# Patient Record
Sex: MALE | Race: WHITE | NOT HISPANIC OR LATINO | URBAN - METROPOLITAN AREA
[De-identification: names, ages, dates, MRNs, and addresses within clinical notes are randomized per-mention and may not be internally consistent; named-entity substitution may affect disease eponyms.]

---

## 2017-04-04 ENCOUNTER — EMERGENCY (EMERGENCY)
Facility: HOSPITAL | Age: 72
LOS: 1 days | Discharge: HOME | End: 2017-04-04
Attending: EMERGENCY MEDICINE | Admitting: EMERGENCY MEDICINE
Payer: MEDICARE

## 2017-04-04 VITALS
TEMPERATURE: 98 F | SYSTOLIC BLOOD PRESSURE: 175 MMHG | OXYGEN SATURATION: 97 % | RESPIRATION RATE: 20 BRPM | DIASTOLIC BLOOD PRESSURE: 88 MMHG | HEART RATE: 85 BPM

## 2017-04-04 VITALS
HEART RATE: 88 BPM | SYSTOLIC BLOOD PRESSURE: 152 MMHG | DIASTOLIC BLOOD PRESSURE: 85 MMHG | TEMPERATURE: 98 F | OXYGEN SATURATION: 100 % | RESPIRATION RATE: 19 BRPM

## 2017-04-04 DIAGNOSIS — R10.11 RIGHT UPPER QUADRANT PAIN: ICD-10-CM

## 2017-04-04 DIAGNOSIS — Z90.89 ACQUIRED ABSENCE OF OTHER ORGANS: Chronic | ICD-10-CM

## 2017-04-04 LAB
ALBUMIN SERPL ELPH-MCNC: 4 G/DL — SIGNIFICANT CHANGE UP (ref 3.3–5)
ALP SERPL-CCNC: 71 U/L — SIGNIFICANT CHANGE UP (ref 40–120)
ALT FLD-CCNC: 17 U/L RC — SIGNIFICANT CHANGE UP (ref 10–45)
ANION GAP SERPL CALC-SCNC: 15 MMOL/L — SIGNIFICANT CHANGE UP (ref 5–17)
APPEARANCE UR: CLEAR — SIGNIFICANT CHANGE UP
APTT BLD: 32.2 SEC — SIGNIFICANT CHANGE UP (ref 27.5–37.4)
AST SERPL-CCNC: 13 U/L — SIGNIFICANT CHANGE UP (ref 10–40)
BASOPHILS # BLD AUTO: 0.1 K/UL — SIGNIFICANT CHANGE UP (ref 0–0.2)
BILIRUB SERPL-MCNC: 0.4 MG/DL — SIGNIFICANT CHANGE UP (ref 0.2–1.2)
BILIRUB UR-MCNC: NEGATIVE — SIGNIFICANT CHANGE UP
BUN SERPL-MCNC: 28 MG/DL — HIGH (ref 7–23)
CALCIUM SERPL-MCNC: 9.4 MG/DL — SIGNIFICANT CHANGE UP (ref 8.4–10.5)
CHLORIDE SERPL-SCNC: 100 MMOL/L — SIGNIFICANT CHANGE UP (ref 96–108)
CO2 SERPL-SCNC: 23 MMOL/L — SIGNIFICANT CHANGE UP (ref 22–31)
COLOR SPEC: YELLOW — SIGNIFICANT CHANGE UP
CREAT SERPL-MCNC: 1.27 MG/DL — SIGNIFICANT CHANGE UP (ref 0.5–1.3)
DIFF PNL FLD: NEGATIVE — SIGNIFICANT CHANGE UP
EOSINOPHIL # BLD AUTO: 0.3 K/UL — SIGNIFICANT CHANGE UP (ref 0–0.5)
EOSINOPHIL NFR BLD AUTO: 3 % — SIGNIFICANT CHANGE UP (ref 0–6)
GAS PNL BLDV: SIGNIFICANT CHANGE UP
GLUCOSE SERPL-MCNC: 319 MG/DL — HIGH (ref 70–99)
GLUCOSE UR QL: >1000
HAV IGM SER-ACNC: SIGNIFICANT CHANGE UP
HBV CORE IGM SER-ACNC: SIGNIFICANT CHANGE UP
HBV SURFACE AG SER-ACNC: SIGNIFICANT CHANGE UP
HCT VFR BLD CALC: 48.1 % — SIGNIFICANT CHANGE UP (ref 39–50)
HCV AB S/CO SERPL IA: 0.23 S/CO — SIGNIFICANT CHANGE UP
HCV AB SERPL-IMP: SIGNIFICANT CHANGE UP
HGB BLD-MCNC: 15.9 G/DL — SIGNIFICANT CHANGE UP (ref 13–17)
HYALINE CASTS # UR AUTO: ABNORMAL
INR BLD: 0.95 RATIO — SIGNIFICANT CHANGE UP (ref 0.88–1.16)
KETONES UR-MCNC: NEGATIVE — SIGNIFICANT CHANGE UP
LEUKOCYTE ESTERASE UR-ACNC: NEGATIVE — SIGNIFICANT CHANGE UP
LIDOCAIN IGE QN: 22 U/L — SIGNIFICANT CHANGE UP (ref 7–60)
LYMPHOCYTES # BLD AUTO: 62 % — HIGH (ref 13–44)
LYMPHOCYTES # BLD AUTO: 8.1 K/UL — HIGH (ref 1–3.3)
MCHC RBC-ENTMCNC: 29 PG — SIGNIFICANT CHANGE UP (ref 27–34)
MCHC RBC-ENTMCNC: 33 GM/DL — SIGNIFICANT CHANGE UP (ref 32–36)
MCV RBC AUTO: 88 FL — SIGNIFICANT CHANGE UP (ref 80–100)
MONOCYTES # BLD AUTO: 0.8 K/UL — SIGNIFICANT CHANGE UP (ref 0–0.9)
MONOCYTES NFR BLD AUTO: 5 % — SIGNIFICANT CHANGE UP (ref 2–14)
NEUTROPHILS # BLD AUTO: 3.5 K/UL — SIGNIFICANT CHANGE UP (ref 1.8–7.4)
NEUTROPHILS NFR BLD AUTO: 29 % — LOW (ref 43–77)
NITRITE UR-MCNC: NEGATIVE — SIGNIFICANT CHANGE UP
PH UR: 5.5 — SIGNIFICANT CHANGE UP (ref 4.8–8)
PLATELET # BLD AUTO: 160 K/UL — SIGNIFICANT CHANGE UP (ref 150–400)
POTASSIUM SERPL-MCNC: 4.3 MMOL/L — SIGNIFICANT CHANGE UP (ref 3.5–5.3)
POTASSIUM SERPL-SCNC: 4.3 MMOL/L — SIGNIFICANT CHANGE UP (ref 3.5–5.3)
PROT SERPL-MCNC: 7 G/DL — SIGNIFICANT CHANGE UP (ref 6–8.3)
PROT UR-MCNC: NEGATIVE — SIGNIFICANT CHANGE UP
PROTHROM AB SERPL-ACNC: 10.3 SEC — SIGNIFICANT CHANGE UP (ref 9.8–12.7)
RBC # BLD: 5.46 M/UL — SIGNIFICANT CHANGE UP (ref 4.2–5.8)
RBC # FLD: 12.7 % — SIGNIFICANT CHANGE UP (ref 10.3–14.5)
SODIUM SERPL-SCNC: 138 MMOL/L — SIGNIFICANT CHANGE UP (ref 135–145)
SP GR SPEC: 1.03 — HIGH (ref 1.01–1.02)
UROBILINOGEN FLD QL: NEGATIVE — SIGNIFICANT CHANGE UP
WBC # BLD: 12.8 K/UL — HIGH (ref 3.8–10.5)
WBC # FLD AUTO: 12.8 K/UL — HIGH (ref 3.8–10.5)
WBC UR QL: SIGNIFICANT CHANGE UP /HPF (ref 0–5)

## 2017-04-04 PROCEDURE — 81001 URINALYSIS AUTO W/SCOPE: CPT

## 2017-04-04 PROCEDURE — 84132 ASSAY OF SERUM POTASSIUM: CPT

## 2017-04-04 PROCEDURE — 83605 ASSAY OF LACTIC ACID: CPT

## 2017-04-04 PROCEDURE — 99284 EMERGENCY DEPT VISIT MOD MDM: CPT | Mod: 25

## 2017-04-04 PROCEDURE — 82803 BLOOD GASES ANY COMBINATION: CPT

## 2017-04-04 PROCEDURE — 71020: CPT | Mod: 26

## 2017-04-04 PROCEDURE — 85610 PROTHROMBIN TIME: CPT

## 2017-04-04 PROCEDURE — 96374 THER/PROPH/DIAG INJ IV PUSH: CPT | Mod: XU

## 2017-04-04 PROCEDURE — 82435 ASSAY OF BLOOD CHLORIDE: CPT

## 2017-04-04 PROCEDURE — 80053 COMPREHEN METABOLIC PANEL: CPT

## 2017-04-04 PROCEDURE — 96375 TX/PRO/DX INJ NEW DRUG ADDON: CPT | Mod: XU

## 2017-04-04 PROCEDURE — 99284 EMERGENCY DEPT VISIT MOD MDM: CPT | Mod: GC

## 2017-04-04 PROCEDURE — 85027 COMPLETE CBC AUTOMATED: CPT

## 2017-04-04 PROCEDURE — 83690 ASSAY OF LIPASE: CPT

## 2017-04-04 PROCEDURE — 85730 THROMBOPLASTIN TIME PARTIAL: CPT

## 2017-04-04 PROCEDURE — 82947 ASSAY GLUCOSE BLOOD QUANT: CPT

## 2017-04-04 PROCEDURE — 80074 ACUTE HEPATITIS PANEL: CPT

## 2017-04-04 PROCEDURE — 84295 ASSAY OF SERUM SODIUM: CPT

## 2017-04-04 PROCEDURE — 74177 CT ABD & PELVIS W/CONTRAST: CPT

## 2017-04-04 PROCEDURE — 82330 ASSAY OF CALCIUM: CPT

## 2017-04-04 PROCEDURE — 74177 CT ABD & PELVIS W/CONTRAST: CPT | Mod: 26

## 2017-04-04 PROCEDURE — 71046 X-RAY EXAM CHEST 2 VIEWS: CPT

## 2017-04-04 PROCEDURE — 85014 HEMATOCRIT: CPT

## 2017-04-04 RX ORDER — FAMOTIDINE 10 MG/ML
20 INJECTION INTRAVENOUS ONCE
Qty: 0 | Refills: 0 | Status: COMPLETED | OUTPATIENT
Start: 2017-04-04 | End: 2017-04-04

## 2017-04-04 RX ORDER — KETOROLAC TROMETHAMINE 30 MG/ML
15 SYRINGE (ML) INJECTION ONCE
Qty: 0 | Refills: 0 | Status: DISCONTINUED | OUTPATIENT
Start: 2017-04-04 | End: 2017-04-04

## 2017-04-04 RX ORDER — ACETAMINOPHEN 500 MG
1000 TABLET ORAL ONCE
Qty: 0 | Refills: 0 | Status: COMPLETED | OUTPATIENT
Start: 2017-04-04 | End: 2017-04-04

## 2017-04-04 RX ADMIN — Medication 400 MILLIGRAM(S): at 08:42

## 2017-04-04 RX ADMIN — Medication 30 MILLILITER(S): at 08:42

## 2017-04-04 RX ADMIN — FAMOTIDINE 20 MILLIGRAM(S): 10 INJECTION INTRAVENOUS at 08:42

## 2017-04-04 NOTE — ED PROVIDER NOTE - PLAN OF CARE
Follow up with Dr. Arzate this week. You were given a copy of your results. You must discuss all findings with your PMD. Any new or worsening symptoms or any other concern please return to the ED.

## 2017-04-04 NOTE — ED ADULT NURSE NOTE - OBJECTIVE STATEMENT
71y male with a pmh of htn, high cholesterol, chronic back pain, and diabetes was sent to the ER today by his PCP for upper right abdominal pain. Pt describes the pain as a stabbing pain with a rating around 4. Pt cannot correlate pain to eating or drinking. Pt has NKDA, denies chest pain and SOB. Pt is A&Ox4. Pt has normoactive bowel sounds and has pain upon palpation of upper right abdomen. Pt is lying comfortable in bed at this time. MD in to assess. 71y male with a pmh of htn, high cholesterol, chronic back pain, and type 2 diabetes was sent to the ER today by his PCP for upper right abdominal pain.  pt is alert and oriented x4 speaking coherently. Pt was seen by his primary care and sent for ultrasound to rule out gallstones. Pt describes the pain as a stabbing pain in right upper quad with a  pain rating "around 4". Pt cannot correlate pain to eating or drinking. Pt has NKDA, denies chest pain and SOB, fever, chills, nausea, vomiting.  Pt has normoactive bowel sounds and has pain upon palpation of upper right abdomen. Pt is lying comfortable in bed at this time. MD at bedside, will continue to reassess.

## 2017-04-04 NOTE — ED PROVIDER NOTE - ATTENDING CONTRIBUTION TO CARE
Private Physician Giovanni Arzate ([phone])  71y male pmh DM,HTN,HLD Fatty liver dx few days ago. Pt had complains of abd pain for past several months. Pt comes to ed complains of pain worsening over past 6wk ruq. No precipitating.alleviating factors. Worse with palp. Had seen pmd had sono showing no gallstones with fatty liver. No nasuea vomiting,.No chest pain palp.sob, fever chills, diarrhea, Pt has been dieting with regaining 12 lbs after "fell off the wagon" PE WDWN male nad normocephalic atraumatic chest chest clear anterior & posterior abd soft +bs no mass rebound guarding. CV no rubs, gallops or murmurs neuro no focal defects  Atilio Patel MD, Facep

## 2017-04-04 NOTE — ED PROVIDER NOTE - CARE PLAN
Principal Discharge DX:	RUQ pain Principal Discharge DX:	RUQ pain  Instructions for follow-up, activity and diet:	Follow up with Dr. Arztae this week. You were given a copy of your results. You must discuss all findings with your PMD. Any new or worsening symptoms or any other concern please return to the ED. Principal Discharge DX:	RUQ pain  Instructions for follow-up, activity and diet:	Follow up with Dr. Arzate this week. You were given a copy of your results. You must discuss all findings with your PMD. Any new or worsening symptoms or any other concern please return to the ED.

## 2017-04-04 NOTE — ED ADULT NURSE NOTE - CHPI ED SYMPTOMS NEG
no dysuria/no blood in stool/no hematuria/no burning urination/no chills/no diarrhea/no vomiting/no nausea/no abdominal distension

## 2017-04-04 NOTE — ED PROVIDER NOTE - PROGRESS NOTE DETAILS
Private Physician na  Atilio Patel MD, Facep {[facility] [phone]. - Ross Mccurdy, Resident Discussed with Dr Arzate will fu pt and ct findings  Atilio Patel MD, Facep

## 2017-04-04 NOTE — ED PROVIDER NOTE - OBJECTIVE STATEMENT
72yo M with pmhx DMII, HTN, Hepatic steatosis, HLD pw right upper quadrant pain for 6 months has been intermittent but this last bout has been for 6mo. Tolerating po without exacerbating symptoms. Pain is persistent then occasional becomes stabbing with movement. Describes stinging like a cramp after running.  No nausea, vomiting, diarrhea, constipation. No dysuria, chest pain, sob, cough, rhinorrhea, fever, melena, hematochezia, calf pain. no change in vision.   +chronic diabetic neuropahty in le bl.   US negative 4 days ago.  Yung Smith

## 2017-04-04 NOTE — ED PROVIDER NOTE - PHYSICAL EXAMINATION
AAOX3, NAD, NCAT, EOMI, PERRL, MMM, Neck Supple, RRR, CTABL, ABD S/ruq ttp no guarding or reboudn, ND +BS, No CVAT, EXT warm, well perfused, No Edema, Distal Pulses Intact, No Rash,  MAEx4, Sensation to soft touch grossly intact, normal strength/tone.

## 2017-04-04 NOTE — ED PROVIDER NOTE - MEDICAL DECISION MAKING DETAILS
right upper quadrant pain with US which demonstrated hepatic steatosis four days - labs, right upper quadrant pain with US which demonstrated hepatic steatosis four days - labs, Pain has persisted will check labs ct abd ua

## 2017-08-07 ENCOUNTER — APPOINTMENT (OUTPATIENT)
Dept: RHEUMATOLOGY | Facility: CLINIC | Age: 72
End: 2017-08-07
Payer: MEDICARE

## 2017-08-07 VITALS
OXYGEN SATURATION: 99 % | WEIGHT: 230 LBS | HEART RATE: 78 BPM | TEMPERATURE: 98 F | BODY MASS INDEX: 31.19 KG/M2 | RESPIRATION RATE: 16 BRPM | SYSTOLIC BLOOD PRESSURE: 170 MMHG | DIASTOLIC BLOOD PRESSURE: 98 MMHG

## 2017-08-07 DIAGNOSIS — Z86.39 PERSONAL HISTORY OF OTHER ENDOCRINE, NUTRITIONAL AND METABOLIC DISEASE: ICD-10-CM

## 2017-08-07 DIAGNOSIS — N28.9 DISORDER OF KIDNEY AND URETER, UNSPECIFIED: ICD-10-CM

## 2017-08-07 DIAGNOSIS — Z78.9 OTHER SPECIFIED HEALTH STATUS: ICD-10-CM

## 2017-08-07 DIAGNOSIS — E78.2 MIXED HYPERLIPIDEMIA: ICD-10-CM

## 2017-08-07 DIAGNOSIS — E66.9 OBESITY, UNSPECIFIED: ICD-10-CM

## 2017-08-07 DIAGNOSIS — Z87.891 PERSONAL HISTORY OF NICOTINE DEPENDENCE: ICD-10-CM

## 2017-08-07 DIAGNOSIS — Z91.19 PATIENT'S NONCOMPLIANCE WITH OTHER MEDICAL TREATMENT AND REGIMEN: ICD-10-CM

## 2017-08-07 DIAGNOSIS — Z60.2 PROBLEMS RELATED TO LIVING ALONE: ICD-10-CM

## 2017-08-07 DIAGNOSIS — I10 ESSENTIAL (PRIMARY) HYPERTENSION: ICD-10-CM

## 2017-08-07 DIAGNOSIS — Z00.00 ENCOUNTER FOR GENERAL ADULT MEDICAL EXAMINATION W/OUT ABNORMAL FINDINGS: ICD-10-CM

## 2017-08-07 PROCEDURE — 99205 OFFICE O/P NEW HI 60 MIN: CPT

## 2017-08-07 SDOH — SOCIAL STABILITY - SOCIAL INSECURITY: PROBLEMS RELATED TO LIVING ALONE: Z60.2

## 2017-08-08 LAB
ALBUMIN MFR SERPL ELPH: 57.7 %
ALBUMIN SERPL ELPH-MCNC: 4.2 G/DL
ALBUMIN SERPL-MCNC: 4.3 G/DL
ALBUMIN/GLOB SERPL: 1.3 RATIO
ALP BLD-CCNC: 80 U/L
ALPHA1 GLOB MFR SERPL ELPH: 3 %
ALPHA1 GLOB SERPL ELPH-MCNC: 0.2 G/DL
ALPHA2 GLOB MFR SERPL ELPH: 9.3 %
ALPHA2 GLOB SERPL ELPH-MCNC: 0.7 G/DL
ALT SERPL-CCNC: 12 U/L
ANION GAP SERPL CALC-SCNC: 15 MMOL/L
APPEARANCE: CLEAR
AST SERPL-CCNC: 11 U/L
B-GLOBULIN MFR SERPL ELPH: 12.4 %
B-GLOBULIN SERPL ELPH-MCNC: 0.9 G/DL
BASOPHILS # BLD AUTO: 0.05 K/UL
BASOPHILS NFR BLD AUTO: 0.4 %
BILIRUB SERPL-MCNC: 0.4 MG/DL
BILIRUBIN URINE: NEGATIVE
BLOOD URINE: NEGATIVE
BUN SERPL-MCNC: 26 MG/DL
CALCIUM SERPL-MCNC: 9.7 MG/DL
CCP AB SER IA-ACNC: <8 UNITS
CHLORIDE SERPL-SCNC: 98 MMOL/L
CHOLEST SERPL-MCNC: 267 MG/DL
CHOLEST/HDLC SERPL: 7.9 RATIO
CO2 SERPL-SCNC: 23 MMOL/L
COLOR: YELLOW
CREAT SERPL-MCNC: 1.46 MG/DL
CRP SERPL-MCNC: 0.2 MG/DL
EOSINOPHIL # BLD AUTO: 0.43 K/UL
EOSINOPHIL NFR BLD AUTO: 3.5 %
ERYTHROCYTE [SEDIMENTATION RATE] IN BLOOD BY WESTERGREN METHOD: 16 MM/HR
GAMMA GLOB FLD ELPH-MCNC: 1.3 G/DL
GAMMA GLOB MFR SERPL ELPH: 17.6 %
GLUCOSE QUALITATIVE U: 1000 MG/DL
GLUCOSE SERPL-MCNC: 213 MG/DL
HBA1C MFR BLD HPLC: 9.8 %
HCT VFR BLD CALC: 45.9 %
HDLC SERPL-MCNC: 34 MG/DL
HGB BLD-MCNC: 15.4 G/DL
IMM GRANULOCYTES NFR BLD AUTO: 0.3 %
INTERPRETATION SERPL IEP-IMP: NORMAL
KETONES URINE: NEGATIVE
LDLC SERPL CALC-MCNC: NORMAL
LEUKOCYTE ESTERASE URINE: NEGATIVE
LYMPHOCYTES # BLD AUTO: 7.14 K/UL
LYMPHOCYTES NFR BLD AUTO: 57.8 %
MAN DIFF?: NORMAL
MCHC RBC-ENTMCNC: 29.2 PG
MCHC RBC-ENTMCNC: 33.6 GM/DL
MCV RBC AUTO: 87.1 FL
MONOCYTES # BLD AUTO: 0.76 K/UL
MONOCYTES NFR BLD AUTO: 6.2 %
NEUTROPHILS # BLD AUTO: 3.93 K/UL
NEUTROPHILS NFR BLD AUTO: 31.8 %
NITRITE URINE: NEGATIVE
PH URINE: 5.5
PLATELET # BLD AUTO: 190 K/UL
POTASSIUM SERPL-SCNC: 4.6 MMOL/L
PROT SERPL-MCNC: 7.5 G/DL
PROTEIN URINE: NEGATIVE MG/DL
PSA FREE SERPL-MCNC: 0.98 NG/ML
RBC # BLD: 5.27 M/UL
RBC # FLD: 14.9 %
RF+CCP IGG SER-IMP: NEGATIVE
RHEUMATOID FACT SER QL: 8 IU/ML
SODIUM SERPL-SCNC: 136 MMOL/L
SPECIFIC GRAVITY URINE: 1.03
TRIGL SERPL-MCNC: 619 MG/DL
UROBILINOGEN URINE: NORMAL MG/DL
WBC # FLD AUTO: 12.35 K/UL

## 2017-08-10 PROBLEM — E66.9 OBESITY (BMI 30-39.9): Status: ACTIVE | Noted: 2017-08-10

## 2017-08-10 PROBLEM — N28.9 RENAL INSUFFICIENCY, MILD: Status: ACTIVE | Noted: 2017-08-10

## 2017-08-10 PROBLEM — Z91.19 NON COMPLIANCE WITH MEDICAL TREATMENT: Status: ACTIVE | Noted: 2017-08-10

## 2017-08-10 PROBLEM — E78.2 MIXED HYPERLIPIDEMIA: Status: ACTIVE | Noted: 2017-08-10

## 2017-08-12 ENCOUNTER — APPOINTMENT (OUTPATIENT)
Dept: RADIOLOGY | Facility: IMAGING CENTER | Age: 72
End: 2017-08-12
Payer: MEDICARE

## 2017-08-12 ENCOUNTER — OUTPATIENT (OUTPATIENT)
Dept: OUTPATIENT SERVICES | Facility: HOSPITAL | Age: 72
LOS: 1 days | End: 2017-08-12
Payer: MEDICARE

## 2017-08-12 DIAGNOSIS — M48.10 ANKYLOSING HYPEROSTOSIS [FORESTIER], SITE UNSPECIFIED: ICD-10-CM

## 2017-08-12 DIAGNOSIS — Z90.89 ACQUIRED ABSENCE OF OTHER ORGANS: Chronic | ICD-10-CM

## 2017-08-12 DIAGNOSIS — M54.6 PAIN IN THORACIC SPINE: ICD-10-CM

## 2017-08-12 DIAGNOSIS — Z00.00 ENCOUNTER FOR GENERAL ADULT MEDICAL EXAMINATION WITHOUT ABNORMAL FINDINGS: ICD-10-CM

## 2017-08-12 PROCEDURE — 72040 X-RAY EXAM NECK SPINE 2-3 VW: CPT

## 2017-08-12 PROCEDURE — 72070 X-RAY EXAM THORAC SPINE 2VWS: CPT

## 2017-08-12 PROCEDURE — 72070 X-RAY EXAM THORAC SPINE 2VWS: CPT | Mod: 26

## 2017-08-12 PROCEDURE — 72040 X-RAY EXAM NECK SPINE 2-3 VW: CPT | Mod: 26

## 2017-08-14 LAB — HLA-B27 RELATED AG QL: NORMAL

## 2017-08-15 ENCOUNTER — FORM ENCOUNTER (OUTPATIENT)
Age: 72
End: 2017-08-15

## 2017-08-16 ENCOUNTER — APPOINTMENT (OUTPATIENT)
Dept: MRI IMAGING | Facility: IMAGING CENTER | Age: 72
End: 2017-08-16

## 2017-08-16 ENCOUNTER — OUTPATIENT (OUTPATIENT)
Dept: OUTPATIENT SERVICES | Facility: HOSPITAL | Age: 72
LOS: 1 days | End: 2017-08-16

## 2017-08-16 DIAGNOSIS — Z90.89 ACQUIRED ABSENCE OF OTHER ORGANS: Chronic | ICD-10-CM

## 2017-08-16 DIAGNOSIS — Z00.8 ENCOUNTER FOR OTHER GENERAL EXAMINATION: ICD-10-CM

## 2017-09-27 ENCOUNTER — APPOINTMENT (OUTPATIENT)
Dept: MRI IMAGING | Facility: HOSPITAL | Age: 72
End: 2017-09-27

## 2017-09-27 ENCOUNTER — OUTPATIENT (OUTPATIENT)
Dept: OUTPATIENT SERVICES | Facility: HOSPITAL | Age: 72
LOS: 1 days | End: 2017-09-27

## 2017-09-27 DIAGNOSIS — Z90.89 ACQUIRED ABSENCE OF OTHER ORGANS: Chronic | ICD-10-CM

## 2017-09-27 DIAGNOSIS — R07.89 OTHER CHEST PAIN: ICD-10-CM

## 2018-01-15 NOTE — ED PROVIDER NOTE - CONSTITUTIONAL DEVELOPMENT, MLM
OT ACUTE Initial Evaluation/Observation Note                                                                                                                                            BASELINE STATUS COMPARED WITH CURRENT STATUS: Presents with ADL/IADL status significantly below baseline, which was independent. Pt lives in an apt alone with limited support of friend or family. Prior to admission was driving and independent with meal prep , shopping and cleaning, Admitted following a fall on stairs sustaining a acute trimalleolar fx right ankle with conservative management using CAM boot and NWB status.     ASSESSMENT:  Treatment today focused on OT evaluation.  Pt had just completed PT session with trial of stance and transfer with inability to maintain his NWB status. Emphasis of OT session on sitting EOB for education and training in compensatory strategies for LE dressing with overall mod assist.  Provided pt with donation clothing with plan to cut opening of pain leg. Issued light UE theraband exercises to assist with strength needed for future transfers.  Pt requesting to talk to nursing regarding pain pills as pt very concerned about potential pain not being controlled. .   Patient will benefit from further skilled OT for continued training to help the patient meet goals as listed in functional data section below. Based on current level of assist needed and lack of support anticipate pt will need SA rehab with pt in agreement    Objective Measures Impacting Discharge Recommendation:      RECOMMENDATIONS FOR DISCHARGE:  Recommendations for Discharge: OT: Less intensive rehab (01/15/18 8416)      Equipment:  PT/OT Mobility Equipment for Discharge: Owns cane (01/15/18 6558)       PLAN: Continue skilled OT  Treatment Plan for Next Session: assess stance with NWB in prep for dressing or possible toilet transfer, reveiw UE theraband exercises        Frequency Comments: M-F() BM    AM-PAC Outcomes -Daily Activity  Domain  How much help from another person does the patient currently need?*  Task Score  Norm   1. Putting on and taking off regular lower body clothing? 2 - A Lot   2. Bathing (including washing, rinsing, drying)?   2 - A Lot   3. Toileting, which includes using toilet, bedpan, or urinal? 2 - A Lot   4. Putting on and taking off regular upper body clothing? 3 - A Little   5. Taking care of personal grooming such as brushing teeth? 4 - None   6. Eating meals?  4 - None   Raw Score: 15/24   Converted Score:  34.69 (Raw score = 15)   G code conversion CK: 40- 59% impairment (Raw score: 13-18)   Converted score >39.4 predictive of discharge to home  *Score based on clinical judgment/expected performance, may not have been performed during this session  Scoring Guidelines  1) Patient may use assistive devices unless otherwise indicated in question  2) Do not consider help for management of medical devices only (IV poles, catheters, NG, etc) as part of assist level  3) If patient requires device that substitutes for toileting or eating function (catheter, NG tube, PEG) they do not engage in these activities and are scored as Total  4) If activity was not observed and patient unable to do the activity, select \"Total\" .  If the patient can do the activity but was not directly observed, use profession judgment to determine how much assistance needed.    Task Modification: clinical decision making of low complexity, no task modification    Diagnosis:  1. Fall, initial encounter    2. Closed displaced Maisonneuve fracture of right lower extremity, initial encounter        Co-morbidities: There is no problem list on file for this patient.      Precautions:  Precautions  Weight Bearing Status: Non-weight bearing right lower extremity (01/15/18 1508)  Other Precautions: Fall, acute trimalleolar fx right ankle.  (01/15/18 1508)  Precautions Comments: Awaiting formal ortho consult, but pt currently has CAM boot on and NWB RLE per  activity orders.  (01/15/18 1508)    Prior Living Situation:  Type of Home: Apartment (01/15/18 1505)  Lives With: Alone (01/15/18 1505)    EDUCATION:   On this date, the patient was educated on ADL.    The response to education was: Needs reinforcement                                                    SUBJECTIVE: Patient's Personal Goal: be able to walk (01/15/18 1601)   Subjective: \"I am worried I won't get enough pain medication\" (01/15/18 1601)       OBJECTIVE:Basic Lines: IV;Dewitt catheter (01/15/18 1508)  Safety Measures: Alarms (01/15/18 1508)    RN reported Jordan Fall Scale Score: 80       Last 24 hours of Functional Data     ADLs  Self Cares/ADL's  Lower Body Clothing Assistance: Maximal Assist (Max) (01/15/18 1601)  Self Cares/ADL's Comments #1: Education on compensatory dressing strategies. Donation clothing provided with possible plan to cut open bottom of right leg pant (01/15/18 1601)    Household mobility  Household Mobility  Supine to Sit: Moderate Assist (Mod) (01/15/18 1601)  Sit to Supine: Moderate Assist (Mod) (01/15/18 1601)  Household Mobility Comments #1: unable to tolerate attempted transfer by PT just prior to OT session  (01/15/18 1601)    Home Management       Tolerance  OT Activity Tolerance  Activity Tolerance: 1:1 Activity to rest (01/15/18 1601)  Activity Tolerance Comments: limited by pain (01/15/18 1601)    Vitals       Cognition  Cognition Comments: would beneift from brief cognitive screen (01/15/18 1601)    Communication/Cognition  Cognition Comments: would beneift from brief cognitive screen (01/15/18 1601)    Patient's Personal Goal: be able to walk (01/15/18 1601)    Therapy Goals  Goals  Short Term Goals to Be Reviewed On: 01/21/18 (01/15/18 1601)  Short Term Goals Are The Same as Discharge Goals: Yes (01/15/18 1601)  Goal Agreement: Patient agrees with goals and treatment plan (01/15/18 1601)  Bathing Short Term Goal 1: min assist standing with UE support and adherence to  NWB (01/15/18 1601)  Dressing Short Term Goal 1: min asisst with AE PRN  (01/15/18 1601)  Toileting Short Term Goal 1: min assist to commode (01/15/18 1601)    Interventions and Treatment Time  Treatment Interventions: ADL retraining;Functional transfer training;UE strengthening/ROM (01/15/18 1601)  OT Time Spent: 45 minutes (01/15/18 1601)      See OT flowsheet for full details regarding the OT therapy provided.      well developed

## 2021-09-03 PROBLEM — E11.9 TYPE 2 DIABETES MELLITUS WITHOUT COMPLICATIONS: Chronic | Status: ACTIVE | Noted: 2017-04-04

## 2021-09-03 PROBLEM — I10 ESSENTIAL (PRIMARY) HYPERTENSION: Chronic | Status: ACTIVE | Noted: 2017-04-04

## 2021-09-03 PROBLEM — E78.00 PURE HYPERCHOLESTEROLEMIA, UNSPECIFIED: Chronic | Status: ACTIVE | Noted: 2017-04-04

## 2021-09-14 ENCOUNTER — APPOINTMENT (OUTPATIENT)
Dept: WOUND CARE | Facility: HOSPITAL | Age: 76
End: 2021-09-14
Payer: MEDICARE

## 2021-09-14 ENCOUNTER — OUTPATIENT (OUTPATIENT)
Dept: OUTPATIENT SERVICES | Facility: HOSPITAL | Age: 76
LOS: 1 days | Discharge: HOME | End: 2021-09-14
Payer: MEDICARE

## 2021-09-14 ENCOUNTER — APPOINTMENT (OUTPATIENT)
Dept: WOUND CARE | Facility: HOSPITAL | Age: 76
End: 2021-09-14

## 2021-09-14 ENCOUNTER — RESULT REVIEW (OUTPATIENT)
Age: 76
End: 2021-09-14

## 2021-09-14 VITALS
SYSTOLIC BLOOD PRESSURE: 122 MMHG | DIASTOLIC BLOOD PRESSURE: 66 MMHG | RESPIRATION RATE: 18 BRPM | TEMPERATURE: 96.9 F | OXYGEN SATURATION: 96 % | HEIGHT: 72 IN | BODY MASS INDEX: 31.15 KG/M2 | HEART RATE: 74 BPM | WEIGHT: 230 LBS

## 2021-09-14 DIAGNOSIS — M54.6 PAIN IN THORACIC SPINE: ICD-10-CM

## 2021-09-14 DIAGNOSIS — Z90.89 ACQUIRED ABSENCE OF OTHER ORGANS: Chronic | ICD-10-CM

## 2021-09-14 DIAGNOSIS — E11.9 TYPE 2 DIABETES MELLITUS W/OUT COMPLICATIONS: ICD-10-CM

## 2021-09-14 DIAGNOSIS — E88.81 METABOLIC SYNDROME: ICD-10-CM

## 2021-09-14 DIAGNOSIS — M48.10 ANKYLOSING HYPEROSTOSIS [FORESTIER], SITE UNSPECIFIED: ICD-10-CM

## 2021-09-14 DIAGNOSIS — S91.309A UNSPECIFIED OPEN WOUND, UNSPECIFIED FOOT, INITIAL ENCOUNTER: ICD-10-CM

## 2021-09-14 DIAGNOSIS — L97.516 NON-PRESSURE CHRONIC ULCER OF OTHER PART OF RIGHT FOOT WITH BONE INVOLVEMENT WITHOUT EVIDENCE OF NECROSIS: ICD-10-CM

## 2021-09-14 DIAGNOSIS — Z87.891 PERSONAL HISTORY OF NICOTINE DEPENDENCE: ICD-10-CM

## 2021-09-14 DIAGNOSIS — R07.89 OTHER CHEST PAIN: ICD-10-CM

## 2021-09-14 DIAGNOSIS — E11.65 TYPE 2 DIABETES MELLITUS WITH HYPERGLYCEMIA: ICD-10-CM

## 2021-09-14 PROCEDURE — 99203 OFFICE O/P NEW LOW 30 MIN: CPT

## 2021-09-14 PROCEDURE — 73630 X-RAY EXAM OF FOOT: CPT

## 2021-09-14 PROCEDURE — 73630 X-RAY EXAM OF FOOT: CPT | Mod: 26,LT

## 2021-09-14 PROCEDURE — G0463: CPT

## 2021-09-14 RX ORDER — ADHESIVE TAPE 3"X 2.3 YD
50 MCG TAPE, NON-MEDICATED TOPICAL DAILY
Refills: 0 | Status: ACTIVE | COMMUNITY

## 2021-09-14 RX ORDER — RAMIPRIL 5 MG/1
5 CAPSULE ORAL DAILY
Refills: 0 | Status: ACTIVE | COMMUNITY

## 2021-09-14 RX ORDER — ALPRAZOLAM 0.25 MG/1
0.25 TABLET ORAL
Qty: 4 | Refills: 0 | Status: DISCONTINUED | COMMUNITY
Start: 2017-08-14 | End: 2021-09-14

## 2021-09-14 RX ORDER — NAPROXEN 500 MG/1
500 TABLET ORAL
Qty: 60 | Refills: 1 | Status: DISCONTINUED | COMMUNITY
Start: 2017-08-07 | End: 2021-09-14

## 2021-09-14 RX ORDER — SEMAGLUTIDE 0.68 MG/ML
INJECTION, SOLUTION SUBCUTANEOUS WEEKLY
Refills: 0 | Status: ACTIVE | COMMUNITY

## 2021-09-14 NOTE — HISTORY OF PRESENT ILLNESS
[FreeTextEntry1] : Patient reports he has a wound on his left hallux that’s been there for over a year being cared for by a podiatrist and doesn't remember their name.  Patient's dermatologist Dr. Fisher from [facility] recommended patient come to the [facility].

## 2021-09-14 NOTE — ASSESSMENT
[FreeTextEntry2] : Restore Skin Integrity\par Infection Control\par Localized wound care\par MRI\par XRay [FreeTextEntry3] : Initial visit [FreeTextEntry4] : Submitted pre auth for xray and MRI as per DPM\par Patient reports he will have xray done following visit\par Patient to f/u in 1 week

## 2021-09-14 NOTE — REVIEW OF SYSTEMS
[Skin Wound] : skin wound [FreeTextEntry5] : HTN [de-identified] : right hallux distal ulcer down to skin, subcutaneous tissue, fat, and bone [de-identified] : diabetic neuropathy [de-identified] : type 2 diabetes

## 2021-09-14 NOTE — PHYSICAL EXAM
[2+] : left 2+ [Ankle Swelling (On Exam)] : not present [Varicose Veins Of Lower Extremities] : not present [] : not present [Skin Ulcer] : ulcer [de-identified] : A&Ox3, NAD [de-identified] : 5/5 strength in all quadrants bilaterally [de-identified] : right hallux distal ulcer down to skin, subcutaneous tissue, fat, and bone [de-identified] : loss of protective sensation bilaterally [de-identified] : DPM removed callus [FreeTextEntry1] : Left Plantar Hallux [FreeTextEntry2] : 0.8 [FreeTextEntry3] : 0.6 [FreeTextEntry4] : 0.4 - probes to bone [de-identified] : serosanguineous [de-identified] : callus [de-identified] : Cleansed with Normal Saline\par  [de-identified] : Silver Alginate [de-identified] : Circulation:\par \par CIRCULATION\par Dorsalis Pedis: R palpable  L unable to palpate\par Posterior Tibialis: R palpable L palpable\par Doppler: Audible\par Extremity Color: Pink\par Extremity Temperature: Warm\par Capillary Refill: < 3 seconds bilaterally\par Vascular studies not ordered by DPM [TWNoteComboBox4] : Small [TWNoteComboBox6] : Diabetic [de-identified] : other [de-identified] : 100% [de-identified] : Bone [de-identified] : 3x Weekly

## 2021-09-14 NOTE — PLAN
[FreeTextEntry1] : Patient examined and evaluated at this time.\par Continue local wound care and offloading.\par Radiographs ordered. Will auth for mri.\par Advised patient regarding possibility of bone infection and treatment plans.\par All questions answered to satisfaction and patient verbalized understanding.\par Spent 30 minutes for patient care and medical decision making.\par Patient to follow up in 1 week.\par

## 2021-09-15 DIAGNOSIS — N28.9 DISORDER OF KIDNEY AND URETER, UNSPECIFIED: ICD-10-CM

## 2021-09-15 DIAGNOSIS — E11.40 TYPE 2 DIABETES MELLITUS WITH DIABETIC NEUROPATHY, UNSPECIFIED: ICD-10-CM

## 2021-09-15 DIAGNOSIS — E11.621 TYPE 2 DIABETES MELLITUS WITH FOOT ULCER: ICD-10-CM

## 2021-09-15 DIAGNOSIS — E11.69 TYPE 2 DIABETES MELLITUS WITH OTHER SPECIFIED COMPLICATION: ICD-10-CM

## 2021-09-15 DIAGNOSIS — Z79.84 LONG TERM (CURRENT) USE OF ORAL HYPOGLYCEMIC DRUGS: ICD-10-CM

## 2021-09-15 DIAGNOSIS — E78.2 MIXED HYPERLIPIDEMIA: ICD-10-CM

## 2021-09-15 DIAGNOSIS — L84 CORNS AND CALLOSITIES: ICD-10-CM

## 2021-09-15 DIAGNOSIS — Z98.890 OTHER SPECIFIED POSTPROCEDURAL STATES: ICD-10-CM

## 2021-09-15 DIAGNOSIS — Z87.891 PERSONAL HISTORY OF NICOTINE DEPENDENCE: ICD-10-CM

## 2021-09-15 DIAGNOSIS — Z79.82 LONG TERM (CURRENT) USE OF ASPIRIN: ICD-10-CM

## 2021-09-15 DIAGNOSIS — Z79.899 OTHER LONG TERM (CURRENT) DRUG THERAPY: ICD-10-CM

## 2021-09-15 DIAGNOSIS — I10 ESSENTIAL (PRIMARY) HYPERTENSION: ICD-10-CM

## 2021-09-15 DIAGNOSIS — L97.516 NON-PRESSURE CHRONIC ULCER OF OTHER PART OF RIGHT FOOT WITH BONE INVOLVEMENT WITHOUT EVIDENCE OF NECROSIS: ICD-10-CM

## 2021-09-15 DIAGNOSIS — E88.81 METABOLIC SYNDROME AND OTHER INSULIN RESISTANCE: ICD-10-CM

## 2021-09-22 ENCOUNTER — OUTPATIENT (OUTPATIENT)
Dept: OUTPATIENT SERVICES | Facility: HOSPITAL | Age: 76
LOS: 1 days | End: 2021-09-22
Payer: MEDICARE

## 2021-09-22 DIAGNOSIS — Z90.89 ACQUIRED ABSENCE OF OTHER ORGANS: Chronic | ICD-10-CM

## 2021-09-22 DIAGNOSIS — E11.621 TYPE 2 DIABETES MELLITUS WITH FOOT ULCER: ICD-10-CM

## 2021-09-22 PROCEDURE — 73718 MRI LOWER EXTREMITY W/O DYE: CPT | Mod: 26,LT,MH

## 2021-09-22 PROCEDURE — 73718 MRI LOWER EXTREMITY W/O DYE: CPT

## 2021-09-29 ENCOUNTER — OUTPATIENT (OUTPATIENT)
Dept: OUTPATIENT SERVICES | Facility: HOSPITAL | Age: 76
LOS: 1 days | Discharge: HOME | End: 2021-09-29
Payer: MEDICARE

## 2021-09-29 ENCOUNTER — APPOINTMENT (OUTPATIENT)
Dept: WOUND CARE | Facility: HOSPITAL | Age: 76
End: 2021-09-29
Payer: MEDICARE

## 2021-09-29 VITALS
OXYGEN SATURATION: 97 % | RESPIRATION RATE: 18 BRPM | BODY MASS INDEX: 31.15 KG/M2 | SYSTOLIC BLOOD PRESSURE: 128 MMHG | HEIGHT: 72 IN | TEMPERATURE: 96.9 F | WEIGHT: 230 LBS | HEART RATE: 71 BPM | DIASTOLIC BLOOD PRESSURE: 74 MMHG

## 2021-09-29 VITALS
DIASTOLIC BLOOD PRESSURE: 74 MMHG | HEART RATE: 71 BPM | SYSTOLIC BLOOD PRESSURE: 120 MMHG | WEIGHT: 230 LBS | HEIGHT: 72 IN | RESPIRATION RATE: 18 BRPM | BODY MASS INDEX: 31.15 KG/M2 | TEMPERATURE: 96.9 F | OXYGEN SATURATION: 97 %

## 2021-09-29 DIAGNOSIS — Z90.89 ACQUIRED ABSENCE OF OTHER ORGANS: Chronic | ICD-10-CM

## 2021-09-29 DIAGNOSIS — E11.621 TYPE 2 DIABETES MELLITUS WITH FOOT ULCER: ICD-10-CM

## 2021-09-29 PROCEDURE — 99214 OFFICE O/P EST MOD 30 MIN: CPT

## 2021-09-29 PROCEDURE — G0463: CPT

## 2021-09-29 PROCEDURE — 87186 SC STD MICRODIL/AGAR DIL: CPT

## 2021-09-29 PROCEDURE — 87070 CULTURE OTHR SPECIMN AEROBIC: CPT

## 2021-09-29 RX ORDER — CIPROFLOXACIN HYDROCHLORIDE 500 MG/1
500 TABLET, FILM COATED ORAL TWICE DAILY
Qty: 14 | Refills: 0 | Status: COMPLETED | COMMUNITY
Start: 2021-09-29 | End: 2021-10-06

## 2021-09-30 DIAGNOSIS — E11.69 TYPE 2 DIABETES MELLITUS WITH OTHER SPECIFIED COMPLICATION: ICD-10-CM

## 2021-09-30 DIAGNOSIS — E88.81 METABOLIC SYNDROME AND OTHER INSULIN RESISTANCE: ICD-10-CM

## 2021-09-30 DIAGNOSIS — Z87.891 PERSONAL HISTORY OF NICOTINE DEPENDENCE: ICD-10-CM

## 2021-09-30 DIAGNOSIS — L97.514 NON-PRESSURE CHRONIC ULCER OF OTHER PART OF RIGHT FOOT WITH NECROSIS OF BONE: ICD-10-CM

## 2021-09-30 DIAGNOSIS — Z79.899 OTHER LONG TERM (CURRENT) DRUG THERAPY: ICD-10-CM

## 2021-09-30 DIAGNOSIS — E78.2 MIXED HYPERLIPIDEMIA: ICD-10-CM

## 2021-09-30 DIAGNOSIS — I10 ESSENTIAL (PRIMARY) HYPERTENSION: ICD-10-CM

## 2021-09-30 DIAGNOSIS — E11.40 TYPE 2 DIABETES MELLITUS WITH DIABETIC NEUROPATHY, UNSPECIFIED: ICD-10-CM

## 2021-09-30 DIAGNOSIS — Z79.84 LONG TERM (CURRENT) USE OF ORAL HYPOGLYCEMIC DRUGS: ICD-10-CM

## 2021-09-30 DIAGNOSIS — Z79.82 LONG TERM (CURRENT) USE OF ASPIRIN: ICD-10-CM

## 2021-09-30 DIAGNOSIS — M86.9 OSTEOMYELITIS, UNSPECIFIED: ICD-10-CM

## 2021-09-30 DIAGNOSIS — N28.9 DISORDER OF KIDNEY AND URETER, UNSPECIFIED: ICD-10-CM

## 2021-09-30 DIAGNOSIS — L84 CORNS AND CALLOSITIES: ICD-10-CM

## 2021-09-30 DIAGNOSIS — E11.621 TYPE 2 DIABETES MELLITUS WITH FOOT ULCER: ICD-10-CM

## 2021-09-30 DIAGNOSIS — Z98.890 OTHER SPECIFIED POSTPROCEDURAL STATES: ICD-10-CM

## 2021-09-30 NOTE — HISTORY OF PRESENT ILLNESS
[FreeTextEntry1] : Pt seen for a follow up of left hallux distal ulcer down to skin, subcutaneous tissue, fat, and bone. Pt relates that he was able to obtain an MRI of the left foot as advised. Relates that he has been dressings the wound as advised. Denies any other complaints at this time.

## 2021-09-30 NOTE — PLAN
[FreeTextEntry1] : Patient examined and evaluated at this time.\par Continue local wound care and offloading.\par MRI discussed with patient. Advised patient regarding bone infection and treatment plans including IV abx, surgical amputation, and hyperbaric oxygen therapy. Patient to pursue IV abx at this time. Patient advised regarding possibility of worsening of infection, further amputation, loss of limb, sepsis, and death. All questions answered to satisfaction and patient verbalized understanding.\par Referral to Dr. Foster for infectious disease consult. Wound culture taken and abx sent to pharmacy.\par Spent 30 minutes for patient care and medical decision making.\par Patient to follow up in 1 week.\par

## 2021-09-30 NOTE — VITALS
[] : No [de-identified] : Pain scale:  0/10 - Patient reports no c/o pains or discomforts at present.

## 2021-09-30 NOTE — REVIEW OF SYSTEMS
[Skin Wound] : skin wound [FreeTextEntry5] : HTN [de-identified] : right hallux distal ulcer down to skin, subcutaneous tissue, fat, and bone (+OM on mri) [de-identified] : diabetic neuropathy [de-identified] : type 2 diabetes

## 2021-09-30 NOTE — ASSESSMENT
[Verbal] : Verbal [Demo] : Demo [Patient] : Patient [Good - alert, interested, motivated] : Good - alert, interested, motivated [Verbalizes knowledge/Understanding] : Verbalizes knowledge/understanding [Dressing changes] : dressing changes [Foot Care] : foot care [Skin Care] : skin care [Signs and symptoms of infection] : sign and symptoms of infection [Nutrition] : nutrition [How and When to Call] : how and when to call [Patient responsibility to plan of care] : patient responsibility to plan of care [] : Yes [Stable] : stable [Home] : Home [Ambulatory] : Ambulatory [Not Applicable - Long Term Care/Home Health Agency] : Long Term Care/Home Health Agency: Not Applicable [Labs and Tests] : labs and tests [Glycemic Control] : glycemic control [Hyperbaric Therapy] : hyperbaric therapy [FreeTextEntry2] : Promote optimal skin integrity, offloading, infection prevention\par  [FreeTextEntry4] : DPM discussed xray and MRI results with patient and educated patient on dx of osteomyelitis.  Patient verbalized understanding.   DPM recommended distal amputation of left hallux.  Patient declined amputation at this time and opted for IV antibiotics and possible HBO tx at a facility closer to his home.  \par \par Swab C&S done.\par \par ID consult with Dr. Foster next visit to be scheduled by Nurse Manager.\par \par Cipro escribed.  DPM educated patient on s/s of infection and advised patient to go to ER if condition worsens.  Patient verbalized understanding.\par \par f/u 1 week

## 2021-09-30 NOTE — PHYSICAL EXAM
[2+] : left 2+ [Ankle Swelling (On Exam)] : not present [Varicose Veins Of Lower Extremities] : not present [] : not present [Skin Ulcer] : ulcer [de-identified] : A&Ox3, NAD [de-identified] : 5/5 strength in all quadrants bilaterally [de-identified] : right hallux distal ulcer down to skin, subcutaneous tissue, fat, and bone (+OM on mri) [de-identified] : loss of protective sensation bilaterally [de-identified] : DPM removed callus [FreeTextEntry1] : Left Plantar Hallux [FreeTextEntry2] : 0.3 [FreeTextEntry4] : 0.2 - probes to bone [FreeTextEntry3] : 0.3 [de-identified] : serosanguineous [de-identified] : callus [de-identified] : Silver Alginate [TWNoteComboBox4] : Small [de-identified] : NSC [TWNoteComboBox6] : Diabetic [de-identified] : other [de-identified] : 100% [de-identified] : Bone [de-identified] : 3x Weekly

## 2021-10-02 LAB
-  AMPICILLIN/SULBACTAM: SIGNIFICANT CHANGE UP
-  CEFAZOLIN: SIGNIFICANT CHANGE UP
-  CLINDAMYCIN: SIGNIFICANT CHANGE UP
-  ERYTHROMYCIN: SIGNIFICANT CHANGE UP
-  GENTAMICIN: SIGNIFICANT CHANGE UP
-  OXACILLIN: SIGNIFICANT CHANGE UP
-  RIFAMPIN: SIGNIFICANT CHANGE UP
-  TETRACYCLINE: SIGNIFICANT CHANGE UP
-  TRIMETHOPRIM/SULFAMETHOXAZOLE: SIGNIFICANT CHANGE UP
-  VANCOMYCIN: SIGNIFICANT CHANGE UP
CULTURE RESULTS: SIGNIFICANT CHANGE UP
METHOD TYPE: SIGNIFICANT CHANGE UP
ORGANISM # SPEC MICROSCOPIC CNT: SIGNIFICANT CHANGE UP
ORGANISM # SPEC MICROSCOPIC CNT: SIGNIFICANT CHANGE UP
SPECIMEN SOURCE: SIGNIFICANT CHANGE UP

## 2021-10-04 ENCOUNTER — NON-APPOINTMENT (OUTPATIENT)
Age: 76
End: 2021-10-04

## 2021-10-05 ENCOUNTER — APPOINTMENT (OUTPATIENT)
Dept: WOUND CARE | Facility: HOSPITAL | Age: 76
End: 2021-10-05
Payer: MEDICARE

## 2021-10-05 ENCOUNTER — OUTPATIENT (OUTPATIENT)
Dept: OUTPATIENT SERVICES | Facility: HOSPITAL | Age: 76
LOS: 1 days | Discharge: HOME | End: 2021-10-05
Payer: MEDICARE

## 2021-10-05 VITALS
WEIGHT: 230 LBS | SYSTOLIC BLOOD PRESSURE: 116 MMHG | OXYGEN SATURATION: 97 % | DIASTOLIC BLOOD PRESSURE: 67 MMHG | TEMPERATURE: 97.6 F | HEIGHT: 72 IN | HEART RATE: 69 BPM | BODY MASS INDEX: 31.15 KG/M2 | RESPIRATION RATE: 20 BRPM

## 2021-10-05 DIAGNOSIS — E11.621 TYPE 2 DIABETES MELLITUS WITH FOOT ULCER: ICD-10-CM

## 2021-10-05 DIAGNOSIS — Z90.89 ACQUIRED ABSENCE OF OTHER ORGANS: Chronic | ICD-10-CM

## 2021-10-05 PROCEDURE — 99213 OFFICE O/P EST LOW 20 MIN: CPT

## 2021-10-05 PROCEDURE — G0463: CPT

## 2021-10-05 NOTE — HISTORY OF PRESENT ILLNESS
[FreeTextEntry1] : DFU 3 distal left great toe , chronic , probes to bone , the toe is swollen and the is erythema and thickened distal callus formation

## 2021-10-05 NOTE — ASSESSMENT
[Verbal] : Verbal [Demo] : Demo [Patient] : Patient [Good - alert, interested, motivated] : Good - alert, interested, motivated [Verbalizes knowledge/Understanding] : Verbalizes knowledge/understanding [Dressing changes] : dressing changes [Foot Care] : foot care [Skin Care] : skin care [Signs and symptoms of infection] : sign and symptoms of infection [Nutrition] : nutrition [How and When to Call] : how and when to call [Labs and Tests] : labs and tests [Hyperbaric Therapy] : hyperbaric therapy [Patient responsibility to plan of care] : patient responsibility to plan of care [Glycemic Control] : glycemic control [Stable] : stable [Home] : Home [Ambulatory] : Ambulatory [Not Applicable - Long Term Care/Home Health Agency] : Long Term Care/Home Health Agency: Not Applicable [] : No [FreeTextEntry2] : Surgical Procedure\par Localized Wound Care \par \par  [FreeTextEntry3] : wounds larger [FreeTextEntry4] : DPM discussed C&S results with patient. Patient verbalized understanding.   DPM recommended distal amputation of left hallux. Patient decided to have amputation completed. Pt to arrive to [facility] on Monday 10/11/21 to be admitted to hospital for procedure. Pt to complete antibiotic regimen.\par \par

## 2021-10-05 NOTE — REVIEW OF SYSTEMS
[Fever] : no fever [Chills] : no chills [Red Eyes] : eyes not red [Loss Of Hearing] : no hearing loss [Shortness Of Breath] : no shortness of breath [Wheezing] : no wheezing [Abdominal Pain] : no abdominal pain [Joint Stiffness] : joint stiffness [Skin Wound] : skin wound [Anxiety] : no anxiety [Easy Bleeding] : no tendency for easy bleeding [FreeTextEntry5] : HTN , HLD [de-identified] : right hallux distal ulcer down to skin, subcutaneous tissue, fat, and bone (+OM on mri) , swollen with periwound erythema  [de-identified] : diabetic neuropathy [de-identified] : type 2 diabetes , NIDDM

## 2021-10-05 NOTE — PHYSICAL EXAM
[2+] : left 2+ [Ankle Swelling (On Exam)] : not present [Varicose Veins Of Lower Extremities] : not present [] : not present [Skin Ulcer] : ulcer [Alert] : alert [Oriented to Person] : oriented to person [Oriented to Place] : oriented to place [Calm] : calm [de-identified] : A&Ox3, NAD [de-identified] : HTN, HLD  [de-identified] : 5/5 strength in all quadrants bilaterally [de-identified] : right hallux distal ulcer down to skin, subcutaneous tissue, fat, and bone (+OM on mri) [de-identified] : loss of protective sensation bilaterally , NIDDM with neuropathy  [FreeTextEntry1] : Left Plantar Hallux [FreeTextEntry2] : 0.7 [FreeTextEntry3] : 0.3 [FreeTextEntry4] : 0.2 - probes to bone [de-identified] : serosanguineous [de-identified] : callus [de-identified] : Silver Alginate [de-identified] : Cleansed with Normal Saline.  [TWNoteComboBox4] : Small [TWNoteComboBox6] : Diabetic [de-identified] : other [de-identified] : Bone [de-identified] : 100% [de-identified] : 3x Weekly

## 2021-10-05 NOTE — PLAN
[FreeTextEntry1] : Continue wound care . Discussed with patient the risks and benefits of surgery vs antibiotics . Patient is aware that this infection can travel from bone to bone and that he at risk of larger amputation and or sepsis . Patient fully understands and all his questions have been answered . Patient understands he is at risk for limb loss and life threatening infection . Patient has had the ulcer for a year , it is getting worse and he elects for surgical intervention Spent 20 minutes for patient care and medical decision making.\par

## 2021-10-06 DIAGNOSIS — E88.81 METABOLIC SYNDROME AND OTHER INSULIN RESISTANCE: ICD-10-CM

## 2021-10-06 DIAGNOSIS — Z79.899 OTHER LONG TERM (CURRENT) DRUG THERAPY: ICD-10-CM

## 2021-10-06 DIAGNOSIS — E11.621 TYPE 2 DIABETES MELLITUS WITH FOOT ULCER: ICD-10-CM

## 2021-10-06 DIAGNOSIS — I10 ESSENTIAL (PRIMARY) HYPERTENSION: ICD-10-CM

## 2021-10-06 DIAGNOSIS — L84 CORNS AND CALLOSITIES: ICD-10-CM

## 2021-10-06 DIAGNOSIS — E11.40 TYPE 2 DIABETES MELLITUS WITH DIABETIC NEUROPATHY, UNSPECIFIED: ICD-10-CM

## 2021-10-06 DIAGNOSIS — Z79.84 LONG TERM (CURRENT) USE OF ORAL HYPOGLYCEMIC DRUGS: ICD-10-CM

## 2021-10-06 DIAGNOSIS — Z79.82 LONG TERM (CURRENT) USE OF ASPIRIN: ICD-10-CM

## 2021-10-06 DIAGNOSIS — E11.69 TYPE 2 DIABETES MELLITUS WITH OTHER SPECIFIED COMPLICATION: ICD-10-CM

## 2021-10-06 DIAGNOSIS — E78.2 MIXED HYPERLIPIDEMIA: ICD-10-CM

## 2021-10-06 DIAGNOSIS — N28.9 DISORDER OF KIDNEY AND URETER, UNSPECIFIED: ICD-10-CM

## 2021-10-06 DIAGNOSIS — Z87.891 PERSONAL HISTORY OF NICOTINE DEPENDENCE: ICD-10-CM

## 2021-10-06 DIAGNOSIS — Z98.890 OTHER SPECIFIED POSTPROCEDURAL STATES: ICD-10-CM

## 2021-10-06 DIAGNOSIS — M86.671 OTHER CHRONIC OSTEOMYELITIS, RIGHT ANKLE AND FOOT: ICD-10-CM

## 2021-10-06 DIAGNOSIS — L97.514 NON-PRESSURE CHRONIC ULCER OF OTHER PART OF RIGHT FOOT WITH NECROSIS OF BONE: ICD-10-CM

## 2021-10-11 ENCOUNTER — INPATIENT (INPATIENT)
Facility: HOSPITAL | Age: 76
LOS: 3 days | Discharge: HOME | DRG: 617 | End: 2021-10-15
Attending: FAMILY MEDICINE | Admitting: INTERNAL MEDICINE
Payer: MEDICARE

## 2021-10-11 ENCOUNTER — TRANSCRIPTION ENCOUNTER (OUTPATIENT)
Age: 76
End: 2021-10-11

## 2021-10-11 VITALS
OXYGEN SATURATION: 98 % | TEMPERATURE: 97 F | DIASTOLIC BLOOD PRESSURE: 78 MMHG | SYSTOLIC BLOOD PRESSURE: 134 MMHG | RESPIRATION RATE: 16 BRPM | HEART RATE: 80 BPM

## 2021-10-11 DIAGNOSIS — I10 ESSENTIAL (PRIMARY) HYPERTENSION: ICD-10-CM

## 2021-10-11 DIAGNOSIS — E78.00 PURE HYPERCHOLESTEROLEMIA, UNSPECIFIED: ICD-10-CM

## 2021-10-11 DIAGNOSIS — M86.9 OSTEOMYELITIS, UNSPECIFIED: ICD-10-CM

## 2021-10-11 DIAGNOSIS — E11.9 TYPE 2 DIABETES MELLITUS WITHOUT COMPLICATIONS: ICD-10-CM

## 2021-10-11 DIAGNOSIS — E11.621 TYPE 2 DIABETES MELLITUS WITH FOOT ULCER: ICD-10-CM

## 2021-10-11 DIAGNOSIS — R79.89 OTHER SPECIFIED ABNORMAL FINDINGS OF BLOOD CHEMISTRY: ICD-10-CM

## 2021-10-11 DIAGNOSIS — Z29.9 ENCOUNTER FOR PROPHYLACTIC MEASURES, UNSPECIFIED: ICD-10-CM

## 2021-10-11 DIAGNOSIS — Z90.89 ACQUIRED ABSENCE OF OTHER ORGANS: Chronic | ICD-10-CM

## 2021-10-11 LAB
A1C WITH ESTIMATED AVERAGE GLUCOSE RESULT: 7 % — HIGH (ref 4–5.6)
ALBUMIN SERPL ELPH-MCNC: 3.5 G/DL — SIGNIFICANT CHANGE UP (ref 3.3–5)
ALP SERPL-CCNC: 58 U/L — SIGNIFICANT CHANGE UP (ref 40–120)
ALT FLD-CCNC: 25 U/L — SIGNIFICANT CHANGE UP (ref 12–78)
ANION GAP SERPL CALC-SCNC: 5 MMOL/L — SIGNIFICANT CHANGE UP (ref 5–17)
APTT BLD: 33.5 SEC — SIGNIFICANT CHANGE UP (ref 27.5–35.5)
AST SERPL-CCNC: 15 U/L — SIGNIFICANT CHANGE UP (ref 15–37)
BASOPHILS # BLD AUTO: 0 K/UL — SIGNIFICANT CHANGE UP (ref 0–0.2)
BASOPHILS NFR BLD AUTO: 0 % — SIGNIFICANT CHANGE UP (ref 0–2)
BILIRUB SERPL-MCNC: 0.4 MG/DL — SIGNIFICANT CHANGE UP (ref 0.2–1.2)
BLD GP AB SCN SERPL QL: SIGNIFICANT CHANGE UP
BUN SERPL-MCNC: 39 MG/DL — HIGH (ref 7–23)
CALCIUM SERPL-MCNC: 8.8 MG/DL — SIGNIFICANT CHANGE UP (ref 8.5–10.1)
CHLORIDE SERPL-SCNC: 109 MMOL/L — HIGH (ref 96–108)
CO2 SERPL-SCNC: 24 MMOL/L — SIGNIFICANT CHANGE UP (ref 22–31)
CREAT SERPL-MCNC: 1.7 MG/DL — HIGH (ref 0.5–1.3)
CRP SERPL-MCNC: <3 MG/L — SIGNIFICANT CHANGE UP
EOSINOPHIL # BLD AUTO: 0.97 K/UL — HIGH (ref 0–0.5)
EOSINOPHIL NFR BLD AUTO: 4 % — SIGNIFICANT CHANGE UP (ref 0–6)
ERYTHROCYTE [SEDIMENTATION RATE] IN BLOOD: 11 MM/HR — SIGNIFICANT CHANGE UP (ref 0–20)
ESTIMATED AVERAGE GLUCOSE: 154 MG/DL — HIGH (ref 68–114)
GLUCOSE SERPL-MCNC: 162 MG/DL — HIGH (ref 70–99)
HCT VFR BLD CALC: 42.2 % — SIGNIFICANT CHANGE UP (ref 39–50)
HGB BLD-MCNC: 13.8 G/DL — SIGNIFICANT CHANGE UP (ref 13–17)
INR BLD: 1.07 RATIO — SIGNIFICANT CHANGE UP (ref 0.88–1.16)
LACTATE SERPL-SCNC: 1.8 MMOL/L — SIGNIFICANT CHANGE UP (ref 0.7–2)
LYMPHOCYTES # BLD AUTO: 14.86 K/UL — HIGH (ref 1–3.3)
LYMPHOCYTES # BLD AUTO: 61 % — HIGH (ref 13–44)
MCHC RBC-ENTMCNC: 28.7 PG — SIGNIFICANT CHANGE UP (ref 27–34)
MCHC RBC-ENTMCNC: 32.7 GM/DL — SIGNIFICANT CHANGE UP (ref 32–36)
MCV RBC AUTO: 87.7 FL — SIGNIFICANT CHANGE UP (ref 80–100)
MONOCYTES # BLD AUTO: 0.73 K/UL — SIGNIFICANT CHANGE UP (ref 0–0.9)
MONOCYTES NFR BLD AUTO: 3 % — SIGNIFICANT CHANGE UP (ref 2–14)
NEUTROPHILS # BLD AUTO: 5.85 K/UL — SIGNIFICANT CHANGE UP (ref 1.8–7.4)
NEUTROPHILS NFR BLD AUTO: 24 % — LOW (ref 43–77)
NRBC # BLD: 0 — SIGNIFICANT CHANGE UP
NRBC # BLD: SIGNIFICANT CHANGE UP /100 WBCS (ref 0–0)
PLAT MORPH BLD: NORMAL — SIGNIFICANT CHANGE UP
PLATELET # BLD AUTO: 191 K/UL — SIGNIFICANT CHANGE UP (ref 150–400)
POTASSIUM SERPL-MCNC: 4.9 MMOL/L — SIGNIFICANT CHANGE UP (ref 3.5–5.3)
POTASSIUM SERPL-SCNC: 4.9 MMOL/L — SIGNIFICANT CHANGE UP (ref 3.5–5.3)
PROT SERPL-MCNC: 8 G/DL — SIGNIFICANT CHANGE UP (ref 6–8.3)
PROTHROM AB SERPL-ACNC: 12.5 SEC — SIGNIFICANT CHANGE UP (ref 10.6–13.6)
RBC # BLD: 4.81 M/UL — SIGNIFICANT CHANGE UP (ref 4.2–5.8)
RBC # FLD: 14.7 % — HIGH (ref 10.3–14.5)
RBC BLD AUTO: SIGNIFICANT CHANGE UP
SARS-COV-2 RNA SPEC QL NAA+PROBE: SIGNIFICANT CHANGE UP
SODIUM SERPL-SCNC: 138 MMOL/L — SIGNIFICANT CHANGE UP (ref 135–145)
VARIANT LYMPHS # BLD: 8 % — HIGH (ref 0–6)
WBC # BLD: 24.36 K/UL — HIGH (ref 3.8–10.5)
WBC # FLD AUTO: 24.36 K/UL — HIGH (ref 3.8–10.5)

## 2021-10-11 PROCEDURE — 99221 1ST HOSP IP/OBS SF/LOW 40: CPT | Mod: 57

## 2021-10-11 PROCEDURE — 99222 1ST HOSP IP/OBS MODERATE 55: CPT

## 2021-10-11 PROCEDURE — 73630 X-RAY EXAM OF FOOT: CPT | Mod: 26,LT

## 2021-10-11 PROCEDURE — 99285 EMERGENCY DEPT VISIT HI MDM: CPT

## 2021-10-11 PROCEDURE — 93010 ELECTROCARDIOGRAM REPORT: CPT

## 2021-10-11 PROCEDURE — 99223 1ST HOSP IP/OBS HIGH 75: CPT | Mod: GC

## 2021-10-11 PROCEDURE — 71046 X-RAY EXAM CHEST 2 VIEWS: CPT | Mod: 26

## 2021-10-11 RX ORDER — SIMVASTATIN 20 MG/1
0 TABLET, FILM COATED ORAL
Qty: 0 | Refills: 0 | DISCHARGE

## 2021-10-11 RX ORDER — CARVEDILOL PHOSPHATE 80 MG/1
12.5 CAPSULE, EXTENDED RELEASE ORAL EVERY 12 HOURS
Refills: 0 | Status: DISCONTINUED | OUTPATIENT
Start: 2021-10-11 | End: 2021-10-12

## 2021-10-11 RX ORDER — SODIUM CHLORIDE 9 MG/ML
1000 INJECTION, SOLUTION INTRAVENOUS
Refills: 0 | Status: DISCONTINUED | OUTPATIENT
Start: 2021-10-11 | End: 2021-10-12

## 2021-10-11 RX ORDER — INSULIN LISPRO 100/ML
VIAL (ML) SUBCUTANEOUS AT BEDTIME
Refills: 0 | Status: DISCONTINUED | OUTPATIENT
Start: 2021-10-11 | End: 2021-10-12

## 2021-10-11 RX ORDER — DEXTROSE 50 % IN WATER 50 %
25 SYRINGE (ML) INTRAVENOUS ONCE
Refills: 0 | Status: DISCONTINUED | OUTPATIENT
Start: 2021-10-11 | End: 2021-10-12

## 2021-10-11 RX ORDER — CEFTRIAXONE 500 MG/1
2000 INJECTION, POWDER, FOR SOLUTION INTRAMUSCULAR; INTRAVENOUS ONCE
Refills: 0 | Status: COMPLETED | OUTPATIENT
Start: 2021-10-11 | End: 2021-10-11

## 2021-10-11 RX ORDER — HYDROMORPHONE HYDROCHLORIDE 2 MG/ML
1 INJECTION INTRAMUSCULAR; INTRAVENOUS; SUBCUTANEOUS ONCE
Refills: 0 | Status: DISCONTINUED | OUTPATIENT
Start: 2021-10-11 | End: 2021-10-11

## 2021-10-11 RX ORDER — DAPAGLIFLOZIN 10 MG/1
0 TABLET, FILM COATED ORAL
Qty: 0 | Refills: 0 | DISCHARGE

## 2021-10-11 RX ORDER — SIMVASTATIN 20 MG/1
40 TABLET, FILM COATED ORAL AT BEDTIME
Refills: 0 | Status: DISCONTINUED | OUTPATIENT
Start: 2021-10-11 | End: 2021-10-12

## 2021-10-11 RX ORDER — COLESEVELAM HYDROCHLORIDE 625 MG/1
0 TABLET, FILM COATED ORAL
Qty: 0 | Refills: 0 | DISCHARGE

## 2021-10-11 RX ORDER — ASPIRIN/CALCIUM CARB/MAGNESIUM 324 MG
81 TABLET ORAL DAILY
Refills: 0 | Status: DISCONTINUED | OUTPATIENT
Start: 2021-10-11 | End: 2021-10-12

## 2021-10-11 RX ORDER — DEXTROSE 50 % IN WATER 50 %
12.5 SYRINGE (ML) INTRAVENOUS ONCE
Refills: 0 | Status: DISCONTINUED | OUTPATIENT
Start: 2021-10-11 | End: 2021-10-12

## 2021-10-11 RX ORDER — INSULIN LISPRO 100/ML
VIAL (ML) SUBCUTANEOUS
Refills: 0 | Status: DISCONTINUED | OUTPATIENT
Start: 2021-10-11 | End: 2021-10-12

## 2021-10-11 RX ORDER — GLUCAGON INJECTION, SOLUTION 0.5 MG/.1ML
1 INJECTION, SOLUTION SUBCUTANEOUS ONCE
Refills: 0 | Status: DISCONTINUED | OUTPATIENT
Start: 2021-10-11 | End: 2021-10-12

## 2021-10-11 RX ORDER — CEFAZOLIN SODIUM 1 G
2000 VIAL (EA) INJECTION EVERY 8 HOURS
Refills: 0 | Status: DISCONTINUED | OUTPATIENT
Start: 2021-10-11 | End: 2021-10-12

## 2021-10-11 RX ORDER — LANOLIN ALCOHOL/MO/W.PET/CERES
3 CREAM (GRAM) TOPICAL AT BEDTIME
Refills: 0 | Status: DISCONTINUED | OUTPATIENT
Start: 2021-10-11 | End: 2021-10-12

## 2021-10-11 RX ORDER — CARVEDILOL PHOSPHATE 80 MG/1
0 CAPSULE, EXTENDED RELEASE ORAL
Qty: 0 | Refills: 0 | DISCHARGE

## 2021-10-11 RX ORDER — HEPARIN SODIUM 5000 [USP'U]/ML
5000 INJECTION INTRAVENOUS; SUBCUTANEOUS EVERY 8 HOURS
Refills: 0 | Status: DISCONTINUED | OUTPATIENT
Start: 2021-10-11 | End: 2021-10-12

## 2021-10-11 RX ORDER — SODIUM CHLORIDE 9 MG/ML
1000 INJECTION INTRAMUSCULAR; INTRAVENOUS; SUBCUTANEOUS
Refills: 0 | Status: DISCONTINUED | OUTPATIENT
Start: 2021-10-12 | End: 2021-10-12

## 2021-10-11 RX ORDER — VANCOMYCIN HCL 1 G
1000 VIAL (EA) INTRAVENOUS ONCE
Refills: 0 | Status: COMPLETED | OUTPATIENT
Start: 2021-10-11 | End: 2021-10-11

## 2021-10-11 RX ORDER — ACETAMINOPHEN 500 MG
650 TABLET ORAL EVERY 6 HOURS
Refills: 0 | Status: DISCONTINUED | OUTPATIENT
Start: 2021-10-11 | End: 2021-10-12

## 2021-10-11 RX ORDER — METFORMIN HYDROCHLORIDE 850 MG/1
0 TABLET ORAL
Qty: 0 | Refills: 0 | DISCHARGE

## 2021-10-11 RX ORDER — DEXTROSE 50 % IN WATER 50 %
15 SYRINGE (ML) INTRAVENOUS ONCE
Refills: 0 | Status: DISCONTINUED | OUTPATIENT
Start: 2021-10-11 | End: 2021-10-12

## 2021-10-11 RX ADMIN — Medication 1000 MILLIGRAM(S): at 10:55

## 2021-10-11 RX ADMIN — CEFTRIAXONE 100 MILLIGRAM(S): 500 INJECTION, POWDER, FOR SOLUTION INTRAMUSCULAR; INTRAVENOUS at 09:28

## 2021-10-11 RX ADMIN — CEFTRIAXONE 2000 MILLIGRAM(S): 500 INJECTION, POWDER, FOR SOLUTION INTRAMUSCULAR; INTRAVENOUS at 10:00

## 2021-10-11 RX ADMIN — CARVEDILOL PHOSPHATE 12.5 MILLIGRAM(S): 80 CAPSULE, EXTENDED RELEASE ORAL at 21:57

## 2021-10-11 RX ADMIN — HYDROMORPHONE HYDROCHLORIDE 1 MILLIGRAM(S): 2 INJECTION INTRAMUSCULAR; INTRAVENOUS; SUBCUTANEOUS at 23:11

## 2021-10-11 RX ADMIN — SIMVASTATIN 40 MILLIGRAM(S): 20 TABLET, FILM COATED ORAL at 21:57

## 2021-10-11 RX ADMIN — Medication 0: at 11:50

## 2021-10-11 RX ADMIN — Medication 3 MILLIGRAM(S): at 23:11

## 2021-10-11 RX ADMIN — Medication 100 MILLIGRAM(S): at 21:57

## 2021-10-11 RX ADMIN — Medication 250 MILLIGRAM(S): at 09:55

## 2021-10-11 NOTE — H&P ADULT - NSHPREVIEWOFSYSTEMS_GEN_ALL_CORE
REVIEW OF SYSTEMS:  Constitutional: denies fever or chills  HEENT: denies headache, dizziness, or lightheadedness  Respiratory: denies SOB or cough   Cardiovascular: denies CP or palpitations  Gastrointestinal: endorses chronic constipation x months, denies nausea, vomiting, diarrhea, abdominal pain, or hematochezia  Genitourinary: denies hematuria, dysuria, frequency, urgency  Musculoskeletal: denies muscle aches, joint swelling, or muscle weakness  Neurologic: endorses numbness of b/l LE from below the knee to feet REVIEW OF SYSTEMS:  Constitutional: denies fever or chills  HEENT: denies headache, dizziness, or lightheadedness  Respiratory: denies SOB or cough   Cardiovascular: denies CP or palpitations  Gastrointestinal: endorses chronic constipation x months, denies nausea, vomiting, diarrhea, abdominal pain, or hematochezia/melena  Genitourinary: denies hematuria, dysuria, frequency, urgency  Musculoskeletal: denies muscle aches, joint swelling, or muscle weakness  Neurologic: endorses numbness of b/l LE from below the knee to feet [chronic]

## 2021-10-11 NOTE — H&P ADULT - HISTORY OF PRESENT ILLNESS
NOTE IN PROGRESS HPI:   Patient is a 76 y/o M with pmhx T2DM, HTN, HLD, peripheral neuropathy, who presents with left first distal hallux osteomyelitis as per outpatient MRI 9/22/21. The patient states he has had infected L distal hallux for x1 year, worsening over the past 2-3months. Currently denies feeling any pain, no drainage, no change in gait. After seeing podiatry, derm, and wound care, he felt that after antibiotics and conservation management, infection failed to properly heal and "wants to get it taken care off" before worsens. Patient endorses having had similar lesions/infected toes in past but states they have always completed resolved, unlike this one. Endorses continued neuropathy from feet up to just below the knee bilaterally. Denies feeling pain (likely 2/2 neuropathy), gait or balance problems, fevers, chills, chest pain, SOB, leg swelling.     IN THE ED:  Temp  98.2 F, HR 78, /80, RR 16, SpO2 97%  S/P ceftriaxone IV 2g, vancomycin IV 1g   EKG: normal sinus rhythm 1st degree block, no acute ischemic changes  Labs significant for WBC 24.36 w/ lymphocytes 14.86, BUN 39, Cr 1.7, Glu 162, eGFR 39  Imaging: negative CXR, L foot MRI on 9/22 - osteomyelitis of L distal phalanx    HPI:   Patient is a 76 y/o M with pmhx T2DM, HTN, HLD, peripheral neuropathy, who presents with left first distal hallux osteomyelitis as per outpatient MRI 9/22/21. Pt saw podiatry today at wound care center. The patient states he has had infected L distal hallux for x1 year, worsening over the past 2-3months. Currently denies feeling any pain, no drainage, no change in gait. After seeing podiatry, derm, and wound care, he felt that after antibiotics and conservation management, infection failed to properly heal and "wants to get it taken care off" before worsens. Patient endorses having had similar lesions/infected toes in past but states they have always completed resolved, unlike this one. Endorses continued neuropathy from feet up to just below the knee bilaterally. Denies feeling pain (likely 2/2 neuropathy), gait or balance problems, fevers, chills, chest pain, SOB, leg swelling.     IN THE ED:  Temp  98.2 F, HR 78, /80, RR 16, SpO2 97%  S/P ceftriaxone IV 2g, vancomycin IV 1g   EKG: normal sinus rhythm 1st degree block, no acute ischemic changes  Labs significant for WBC 24.36 w/ lymphocytes 14.86, BUN 39, Cr 1.7, Glu 162, eGFR 39  Imaging: negative CXR, L foot MRI on 9/22 - osteomyelitis of L distal phalanx    HPI:   Patient is a 76 y/o M with pmhx T2DM, HTN, HLD, peripheral neuropathy, who presents with left first distal hallux osteomyelitis as per outpatient MRI 9/22/21. Pt saw podiatry today at wound care center. The patient states he has had infected L distal hallux for x1 year, worsening over the past 2-3months. Currently denies feeling any pain, no drainage, no change in gait. After seeing podiatry, derm, and wound care, he felt that after antibiotics and conservation management, infection failed to properly heal and "wants to get it taken care of" before worsens. Patient endorses having had similar lesions/infected toes in past but states they have always completed resolved, unlike this one. Endorses continued neuropathy from feet up to just below the knee bilaterally. Denies feeling pain (likely 2/2 neuropathy), gait or balance problems, fevers, chills, chest pain, SOB, leg swelling.   Denies past hx of CAD/MI/TIA/CVA/CHF. He can ambulate 2 flights of stairs without SOB or chest pain.     IN THE ED:  Temp  98.2 F, HR 78, /80, RR 16, SpO2 97%  S/P ceftriaxone IV 2g, vancomycin IV 1g   EKG: normal sinus rhythm 1st degree block, no acute ischemic changes  Labs significant for WBC 24.36 w/ lymphocytes 14.86, BUN 39, Cr 1.7, Glu 162, eGFR 39  Imaging: negative CXR, L foot MRI on 9/22 - osteomyelitis of L distal phalanx

## 2021-10-11 NOTE — CONSULT NOTE ADULT - ATTENDING COMMENTS
Patient evaluated at the bedside. L PT is palpable. DP biphasic signal. Signal at the base of hallux. No further intervention is needed. OK to have podiatric surgery.

## 2021-10-11 NOTE — H&P ADULT - PROBLEM SELECTOR PLAN 1
- admit to general medicine floor  - afebrile, WBC 24.36   - s/p Ceftriaxone 2g, Vanco 1g in ED  - MRI L foot on 9/22: soft tissue ulcer along the distal aspect of the first toe with findings concerning for osteomyelitis of the underlying first distal phalanx. Small second webspace interdigital neuroma measuring 4 x 3 mm. Mild third webspace intermetatarsal bursitis.  - f/u L foot x-rays 3 views   - will consult ID  - podiatry consulted, plan for left partial hallux amputation tomorrow Dr. Lugo 9:30AM, pt to be NPO overnight, revised cardiac index 0 - admit to general medicine floor  - afebrile, WBC 24.36   - s/p Ceftriaxone 2g, Vanco 1g in ED  - MRI L foot on 9/22: soft tissue ulcer along the distal aspect of the first toe with findings concerning for osteomyelitis of the underlying first distal phalanx. Small second webspace interdigital neuroma measuring 4 x 3 mm. Mild third webspace intermetatarsal bursitis.  - f/u L foot x-rays 3 views   - ID consulted, Dr Foster   - podiatry consulted, plan for left partial hallux amputation tomorrow Dr. Lugo 9:30AM, pt to be NPO overnight, revised cardiac index 0 - admit to general medicine floor [does not meet sepsis criteria]  - afebrile, WBC 24.36   - s/p Ceftriaxone 2g, Vanco 1g in ED  - MRI L foot on 9/22: soft tissue ulcer along the distal aspect of the first toe with findings concerning for osteomyelitis of the underlying first distal phalanx. Small second webspace interdigital neuroma measuring 4 x 3 mm. Mild third webspace intermetatarsal bursitis.  - f/u L foot x-rays 3 views   - ID consulted, Dr Foster   - podiatry consulted, plan for left partial hallux amputation tomorrow Dr. Lugo 9:30AM, pt to be NPO overnight, revised cardiac index 0  -patient is medically optimized for podiatric surgery if clinically indicated.

## 2021-10-11 NOTE — ED ADULT NURSE NOTE - NURSING MUSC JOINTS
no pain, swelling or deformity of joints Acitretin Counseling:  I discussed with the patient the risks of acitretin including but not limited to hair loss, dry lips/skin/eyes, liver damage, hyperlipidemia, depression/suicidal ideation, photosensitivity.  Serious rare side effects can include but are not limited to pancreatitis, pseudotumor cerebri, bony changes, clot formation/stroke/heart attack.  Patient understands that alcohol is contraindicated since it can result in liver toxicity and significantly prolong the elimination of the drug by many years.

## 2021-10-11 NOTE — H&P ADULT - NSICDXFAMILYHX_GEN_ALL_CORE_FT
FAMILY HISTORY:  Father  Still living? Unknown  FHx: coronary artery disease, Age at diagnosis: Age Unknown    Grandparent  Still living? No  FHx: coronary artery disease, Age at diagnosis: Age Unknown

## 2021-10-11 NOTE — H&P ADULT - NSHPSOCIALHISTORY_GEN_ALL_CORE
Lives alone, hx tobacco use 1ppd x 10 years, greater than 40 years ago. Denies smoking or etoh use at present.

## 2021-10-11 NOTE — PHARMACOTHERAPY INTERVENTION NOTE - COMMENTS
Patient is a 75y Male came in with nonhealing ulcer of first digit (osteomyelitis). Patient was counseled on the following medications that were ordered inpatient:    IV Ceftriaxone 2000 mg   IV Vancomycin 1000 mg    Discussed indication and directions for use of each medication with patient and provided additional patient education in print form    Patient verbalized understanding

## 2021-10-11 NOTE — H&P ADULT - ASSESSMENT
Patient is a 76 y/o M with pmhx T2DM, HTN, HLD, peripheral neuropathy, who presents with left first distal hallux osteomyelitis as per outpatient MRI 9/22/21. The patient states he has had infected L distal hallux for x1 year, worsening over the past 2-3months. Currently denies feeling any pain, no drainage, no change in gait.

## 2021-10-11 NOTE — ED PROVIDER NOTE - PHYSICAL EXAMINATION
Gen: Well appearing in NAD  Head: NC/AT  Neck: trachea midline  cv;rrr  Resp:  No distress, lungs clear  Ext: no deformities, L first toe with chronic ulceration, erythema. DP pulses intact   Neuro:  A&O appears non focal  Skin:  Warm and dry as visualized  Psych:  Normal affect and mood

## 2021-10-11 NOTE — ED PROVIDER NOTE - OBJECTIVE STATEMENT
76yo M with htn and DM2, sent in from wound care for amputation of L 1st distal hallux. pt has had nonhealing ulcer of first digit x 1 year, trial of abx without improvement, had outpt MRI showing osteomyelitis, now sent in for amputation, denies pain, fevers chills or other systemic symptoms. 76yo M with htn and DM2, sent in from wound care for amputation of L 1st distal hallux. pt has had nonhealing ulcer of first digit x 1 year, trial of abx without improvement, had outpt MRI showing osteomyelitis, now sent in for amputation, denies pain, fevers chills or other systemic symptoms.  pcp yovanny

## 2021-10-11 NOTE — CHART NOTE - NSCHARTNOTEFT_GEN_A_CORE
RN called Hold PM dose and AM dose RN called that patient is going in for L great toe amputation tomorrow. Patient has Tylenol PRN ordered for mild pain but is complaining of severe pain. Pt also on prophylactic dose Heparin q8 hours. Patient asking for something to sleep.    - One time dose Dilaudid 1mg ordered  - Hold PM dose and AM dose heparin  - 3mg Melatonin at bedtime ordered

## 2021-10-11 NOTE — H&P ADULT - PROBLEM SELECTOR PLAN 2
- hold home medications  - start low-dose sliding scale  - f/u A1c  - hypoglycemia protocol in place - hold home Metformin, Ozempic (last dose Ozempic 10/10/21 pm)  - start low-dose sliding scale  - f/u A1c  - hypoglycemia protocol in place

## 2021-10-11 NOTE — ED ADULT NURSE NOTE - NSIMPLEMENTINTERV_GEN_ALL_ED
Implemented All Universal Safety Interventions:  [place] to call system. Call bell, personal items and telephone within reach. Instruct patient to call for assistance. Room bathroom lighting operational. Non-slip footwear when patient is off stretcher. Physically safe environment: no spills, clutter or unnecessary equipment. Stretcher in lowest position, wheels locked, appropriate side rails in place.

## 2021-10-11 NOTE — CONSULT NOTE ADULT - PROBLEM SELECTOR RECOMMENDATION 9
Pt seen and evaluated in the Wound Care Center.  Please start broad spectrum IV abx- osteomyelitis of the left distal phalanx seen on MRI from Sept 2021, and elevated WBC in this admission.  Please obtain ID consult for IV abx.  Pt is booked for a left partial hallux amputation tomorrow with Dr. Lugo at 9:30 AM.  Please obtain left foot x-rays, 3 views.  Please medical clearance for the proposed procedure.  Please keep pt NPO overnight, and obtain pre-op labs (CBC, CMP, ESR, CRP, A1c, type and screen).  Pt will be followed by Podiatry while in house. Pt seen and evaluated in the Wound Care Center.  Please start broad spectrum IV abx- osteomyelitis of the left distal phalanx seen on MRI from Sept 2021, and elevated WBC in this admission.  Please obtain ID consult for IV abx.  Pt is booked for a left partial hallux amputation tomorrow with Dr. Lugo at 9:30 AM.  Please obtain left foot x-rays, 3 views.  Please medical clearance for the proposed procedure.  Please keep pt NPO tonight and obtain pre-op labs (CBC, CMP, ESR, CRP, A1c, type and screen).  Pt will be followed by Podiatry while in house. Pt seen and evaluated in the Wound Care Center.  Please start broad spectrum IV abx- osteomyelitis of the left distal phalanx seen on MRI from Sept 2021, and elevated WBC in this admission.  Please obtain ID consult for IV abx.  Pt is booked for a left partial hallux amputation tomorrow with Dr. Lugo at 9:30 AM.  Please obtain left foot x-rays, 3 views.  Please medical clearance for the proposed procedure.  Please keep pt NPO tonight and obtain pre-op labs (CBC, CMP, ESR, CRP, A1c, type and screen).  Arterial studies ordered and Vascular consulted.  Pt will be followed by Podiatry while in house.

## 2021-10-11 NOTE — ED PROVIDER NOTE - CLINICAL SUMMARY MEDICAL DECISION MAKING FREE TEXT BOX
76yo M sent for infection and OM of left 1st toe, scheduled for amputation, denies systemic symptoms, will check labs, cultures, esr, crp, antibiotics, podiatry to see

## 2021-10-11 NOTE — H&P ADULT - PROBLEM SELECTOR PLAN 3
- continue simvastatin 40mg daily - BUN 39, Cr 1.7, unknown baseline though Cr 1.46, 1.27 in 2017  - unclear CKD vs. CECILIA  - BUN:Cr 22, ?prerenal CECILIA 2/2 dehydration - BUN 39, Cr 1.7, unknown baseline though Cr 1.46, 1.27 in 2017  - unclear CKD vs. CECILIA  - BUN:Cr 22, ?prerenal CECILIA 2/2 dehydration  -repeat BMP in AM  -hold ACE-i

## 2021-10-11 NOTE — CONSULT NOTE ADULT - ASSESSMENT
Left distal plantar hallux G3 ulcer    Booked for left partial hallux amputation tomorrow 10/12/21 with Dr. Lugo at 9:30AM  ·	Please provide medical clearance for the proposed procedure

## 2021-10-11 NOTE — H&P ADULT - ATTENDING COMMENTS
Patient is a 74 y/o M with pmhx T2DM, HTN, HLD, peripheral neuropathy, who presents with left first distal hallux osteomyelitis as per outpatient MRI 9/22/21. The patient states he has had infected L distal hallux for x1 year, worsening over the past 2-3months. Currently denies feeling any pain, no drainage, no change in gait.    HPI as above.     T(C): 36.9 (10-11-21 @ 14:01), Max: 36.9 (10-11-21 @ 14:01)  HR: 80 (10-11-21 @ 14:01) (78 - 80)  BP: 142/76 (10-11-21 @ 14:01) (134/78 - 142/76)  RR: 16 (10-11-21 @ 14:01) (16 - 16)  SpO2: 97% (10-11-21 @ 14:01) (97% - 98%)  Wt(kg): --    Physical Exam:   GENERAL: well-groomed, well-developed, NAD  HEENT: head NC/AT;  conjunctiva & sclera clear; hearing grossly intact, moist mucous membranes  NECK: supple, no JVD  RESPIRATORY: CTA B/L, no wheezing, rales, rhonchi or rubs  CARDIOVASCULAR: S1&S2, RRR, no murmurs or gallops  ABDOMEN: soft, non-tender, non-distended, + Bowel sounds x4 quadrants, no guarding, rebound or rigidity  MUSCULOSKELETAL:  no clubbing, cyanosis or edema of all 4 extremities  LYMPH: no cervical lymphadenopathy  VASCULAR: Radial pulses 2+ bilaterally, no varicose veins   SKIN: Left 1st digit dry gangrene appearance, no active drainage  NEUROLOGIC: AA&O X3, CN2-12 grossly intact w/ no focal deficits, decreased sensation b/l LE, motor Strength 5/5 in UE & LE B/L  Psych: Normal mood and affect, normal behavior    Plan:   Admit due to osteomyelitis. Does not meet sepsis criteria.   -continue IV abx.   -trend WBC count and monitor for fevers.   -remainder as above.

## 2021-10-11 NOTE — H&P ADULT - PROBLEM SELECTOR PLAN 4
- continue carvedilol 40 mg daily - continue  carvedilol 40 mg daily - continue carvedilol 40 mg daily  - hold home ramipril for 1 day, due to elevated Cr 1.7 - continue simvastatin 40mg daily

## 2021-10-11 NOTE — H&P ADULT - NSHPPHYSICALEXAM_GEN_ALL_CORE
PHYSICAL EXAM:  Gen: well appearing, in NAD  HEENT: NCAT   Cardio: regular rate and rhythm, +s1s2, no murmurs, rubs, or gallops  Pulm: CTA b/l, no wheezes, rales or rhonchi  Abdomen: normoactive bowel sounds, soft, nontender, nondistended  Extremities: L great toe distal phalanx with plantar lesion 2x3cm, without drainage, excoriations/scabbing on distal aspect, 1/2 proximal nail bed spared. No leg swelling b/l. DP pulse 2+ on RLE, unable to palpate DP or PT on L foot.  Neuro: AAOx3

## 2021-10-11 NOTE — H&P ADULT - PROBLEM SELECTOR PLAN 5
- start Lovenox 40mg SQ - start Heparin 5000 U q8 hours - continue carvedilol 40 mg daily  - hold home ramipril for 1 day, due to elevated Cr 1.7 - continue carvedilol 12.5mg q12h daily  - hold home ramipril for 1 day, due to elevated Cr 1.7

## 2021-10-12 ENCOUNTER — RESULT REVIEW (OUTPATIENT)
Age: 76
End: 2021-10-12

## 2021-10-12 ENCOUNTER — TRANSCRIPTION ENCOUNTER (OUTPATIENT)
Age: 76
End: 2021-10-12

## 2021-10-12 LAB
A1C WITH ESTIMATED AVERAGE GLUCOSE RESULT: 6.8 % — HIGH (ref 4–5.6)
ANION GAP SERPL CALC-SCNC: 5 MMOL/L — SIGNIFICANT CHANGE UP (ref 5–17)
BASOPHILS # BLD AUTO: 0 K/UL — SIGNIFICANT CHANGE UP (ref 0–0.2)
BASOPHILS NFR BLD AUTO: 0 % — SIGNIFICANT CHANGE UP (ref 0–2)
BUN SERPL-MCNC: 29 MG/DL — HIGH (ref 7–23)
CALCIUM SERPL-MCNC: 8.3 MG/DL — LOW (ref 8.5–10.1)
CHLORIDE SERPL-SCNC: 108 MMOL/L — SIGNIFICANT CHANGE UP (ref 96–108)
CO2 SERPL-SCNC: 25 MMOL/L — SIGNIFICANT CHANGE UP (ref 22–31)
COVID-19 SPIKE DOMAIN AB INTERP: POSITIVE
COVID-19 SPIKE DOMAIN ANTIBODY RESULT: 87.3 U/ML — HIGH
CREAT SERPL-MCNC: 1.5 MG/DL — HIGH (ref 0.5–1.3)
EOSINOPHIL # BLD AUTO: 0.2 K/UL — SIGNIFICANT CHANGE UP (ref 0–0.5)
EOSINOPHIL NFR BLD AUTO: 1 % — SIGNIFICANT CHANGE UP (ref 0–6)
ESTIMATED AVERAGE GLUCOSE: 148 MG/DL — HIGH (ref 68–114)
GLUCOSE SERPL-MCNC: 109 MG/DL — HIGH (ref 70–99)
GRAM STN FLD: SIGNIFICANT CHANGE UP
HCT VFR BLD CALC: 38.9 % — LOW (ref 39–50)
HCV AB S/CO SERPL IA: 0.36 S/CO — SIGNIFICANT CHANGE UP (ref 0–0.99)
HCV AB SERPL-IMP: SIGNIFICANT CHANGE UP
HGB BLD-MCNC: 12.5 G/DL — LOW (ref 13–17)
LYMPHOCYTES # BLD AUTO: 15.2 K/UL — HIGH (ref 1–3.3)
LYMPHOCYTES # BLD AUTO: 75 % — HIGH (ref 13–44)
MCHC RBC-ENTMCNC: 28.3 PG — SIGNIFICANT CHANGE UP (ref 27–34)
MCHC RBC-ENTMCNC: 32.1 GM/DL — SIGNIFICANT CHANGE UP (ref 32–36)
MCV RBC AUTO: 88.2 FL — SIGNIFICANT CHANGE UP (ref 80–100)
MONOCYTES # BLD AUTO: 0.81 K/UL — SIGNIFICANT CHANGE UP (ref 0–0.9)
MONOCYTES NFR BLD AUTO: 4 % — SIGNIFICANT CHANGE UP (ref 2–14)
NEUTROPHILS # BLD AUTO: 4.05 K/UL — SIGNIFICANT CHANGE UP (ref 1.8–7.4)
NEUTROPHILS NFR BLD AUTO: 20 % — LOW (ref 43–77)
NRBC # BLD: SIGNIFICANT CHANGE UP /100 WBCS (ref 0–0)
PLATELET # BLD AUTO: 156 K/UL — SIGNIFICANT CHANGE UP (ref 150–400)
POTASSIUM SERPL-MCNC: 4.4 MMOL/L — SIGNIFICANT CHANGE UP (ref 3.5–5.3)
POTASSIUM SERPL-SCNC: 4.4 MMOL/L — SIGNIFICANT CHANGE UP (ref 3.5–5.3)
RBC # BLD: 4.41 M/UL — SIGNIFICANT CHANGE UP (ref 4.2–5.8)
RBC # FLD: 14.6 % — HIGH (ref 10.3–14.5)
SARS-COV-2 IGG+IGM SERPL QL IA: 87.3 U/ML — HIGH
SARS-COV-2 IGG+IGM SERPL QL IA: POSITIVE
SODIUM SERPL-SCNC: 138 MMOL/L — SIGNIFICANT CHANGE UP (ref 135–145)
SPECIMEN SOURCE: SIGNIFICANT CHANGE UP
WBC # BLD: 20.26 K/UL — HIGH (ref 3.8–10.5)
WBC # FLD AUTO: 20.26 K/UL — HIGH (ref 3.8–10.5)

## 2021-10-12 PROCEDURE — 88304 TISSUE EXAM BY PATHOLOGIST: CPT | Mod: 26

## 2021-10-12 PROCEDURE — 99233 SBSQ HOSP IP/OBS HIGH 50: CPT

## 2021-10-12 PROCEDURE — 28825 PARTIAL AMPUTATION OF TOE: CPT | Mod: TA

## 2021-10-12 PROCEDURE — 99232 SBSQ HOSP IP/OBS MODERATE 35: CPT

## 2021-10-12 PROCEDURE — 73630 X-RAY EXAM OF FOOT: CPT | Mod: 26,LT

## 2021-10-12 PROCEDURE — 88311 DECALCIFY TISSUE: CPT | Mod: 26

## 2021-10-12 RX ORDER — CARVEDILOL PHOSPHATE 80 MG/1
12.5 CAPSULE, EXTENDED RELEASE ORAL EVERY 12 HOURS
Refills: 0 | Status: DISCONTINUED | OUTPATIENT
Start: 2021-10-12 | End: 2021-10-15

## 2021-10-12 RX ORDER — SODIUM CHLORIDE 9 MG/ML
1000 INJECTION, SOLUTION INTRAVENOUS
Refills: 0 | Status: DISCONTINUED | OUTPATIENT
Start: 2021-10-12 | End: 2021-10-15

## 2021-10-12 RX ORDER — OXYCODONE HYDROCHLORIDE 5 MG/1
5 TABLET ORAL ONCE
Refills: 0 | Status: DISCONTINUED | OUTPATIENT
Start: 2021-10-12 | End: 2021-10-12

## 2021-10-12 RX ORDER — HYDROMORPHONE HYDROCHLORIDE 2 MG/ML
0.5 INJECTION INTRAMUSCULAR; INTRAVENOUS; SUBCUTANEOUS
Refills: 0 | Status: DISCONTINUED | OUTPATIENT
Start: 2021-10-12 | End: 2021-10-12

## 2021-10-12 RX ORDER — SIMVASTATIN 20 MG/1
40 TABLET, FILM COATED ORAL AT BEDTIME
Refills: 0 | Status: DISCONTINUED | OUTPATIENT
Start: 2021-10-12 | End: 2021-10-15

## 2021-10-12 RX ORDER — SODIUM CHLORIDE 9 MG/ML
1000 INJECTION, SOLUTION INTRAVENOUS
Refills: 0 | Status: DISCONTINUED | OUTPATIENT
Start: 2021-10-12 | End: 2021-10-12

## 2021-10-12 RX ORDER — DEXTROSE 50 % IN WATER 50 %
15 SYRINGE (ML) INTRAVENOUS ONCE
Refills: 0 | Status: DISCONTINUED | OUTPATIENT
Start: 2021-10-12 | End: 2021-10-15

## 2021-10-12 RX ORDER — HEPARIN SODIUM 5000 [USP'U]/ML
5000 INJECTION INTRAVENOUS; SUBCUTANEOUS EVERY 8 HOURS
Refills: 0 | Status: DISCONTINUED | OUTPATIENT
Start: 2021-10-12 | End: 2021-10-15

## 2021-10-12 RX ORDER — ACETAMINOPHEN 500 MG
650 TABLET ORAL EVERY 6 HOURS
Refills: 0 | Status: DISCONTINUED | OUTPATIENT
Start: 2021-10-12 | End: 2021-10-15

## 2021-10-12 RX ORDER — DEXTROSE 50 % IN WATER 50 %
12.5 SYRINGE (ML) INTRAVENOUS ONCE
Refills: 0 | Status: DISCONTINUED | OUTPATIENT
Start: 2021-10-12 | End: 2021-10-15

## 2021-10-12 RX ORDER — DEXTROSE 50 % IN WATER 50 %
25 SYRINGE (ML) INTRAVENOUS ONCE
Refills: 0 | Status: DISCONTINUED | OUTPATIENT
Start: 2021-10-12 | End: 2021-10-15

## 2021-10-12 RX ORDER — INSULIN LISPRO 100/ML
VIAL (ML) SUBCUTANEOUS
Refills: 0 | Status: DISCONTINUED | OUTPATIENT
Start: 2021-10-12 | End: 2021-10-15

## 2021-10-12 RX ORDER — GLUCAGON INJECTION, SOLUTION 0.5 MG/.1ML
1 INJECTION, SOLUTION SUBCUTANEOUS ONCE
Refills: 0 | Status: DISCONTINUED | OUTPATIENT
Start: 2021-10-12 | End: 2021-10-15

## 2021-10-12 RX ORDER — CEFAZOLIN SODIUM 1 G
2000 VIAL (EA) INJECTION EVERY 8 HOURS
Refills: 0 | Status: DISCONTINUED | OUTPATIENT
Start: 2021-10-12 | End: 2021-10-15

## 2021-10-12 RX ORDER — ASPIRIN/CALCIUM CARB/MAGNESIUM 324 MG
81 TABLET ORAL DAILY
Refills: 0 | Status: DISCONTINUED | OUTPATIENT
Start: 2021-10-13 | End: 2021-10-15

## 2021-10-12 RX ORDER — LANOLIN ALCOHOL/MO/W.PET/CERES
3 CREAM (GRAM) TOPICAL AT BEDTIME
Refills: 0 | Status: DISCONTINUED | OUTPATIENT
Start: 2021-10-12 | End: 2021-10-15

## 2021-10-12 RX ORDER — ONDANSETRON 8 MG/1
4 TABLET, FILM COATED ORAL ONCE
Refills: 0 | Status: DISCONTINUED | OUTPATIENT
Start: 2021-10-12 | End: 2021-10-12

## 2021-10-12 RX ADMIN — CARVEDILOL PHOSPHATE 12.5 MILLIGRAM(S): 80 CAPSULE, EXTENDED RELEASE ORAL at 17:25

## 2021-10-12 RX ADMIN — HEPARIN SODIUM 5000 UNIT(S): 5000 INJECTION INTRAVENOUS; SUBCUTANEOUS at 21:09

## 2021-10-12 RX ADMIN — Medication 100 MILLIGRAM(S): at 21:09

## 2021-10-12 RX ADMIN — CARVEDILOL PHOSPHATE 12.5 MILLIGRAM(S): 80 CAPSULE, EXTENDED RELEASE ORAL at 05:58

## 2021-10-12 RX ADMIN — Medication 3 MILLIGRAM(S): at 23:00

## 2021-10-12 RX ADMIN — SIMVASTATIN 40 MILLIGRAM(S): 20 TABLET, FILM COATED ORAL at 21:08

## 2021-10-12 RX ADMIN — Medication 100 MILLIGRAM(S): at 05:59

## 2021-10-12 RX ADMIN — HYDROMORPHONE HYDROCHLORIDE 1 MILLIGRAM(S): 2 INJECTION INTRAMUSCULAR; INTRAVENOUS; SUBCUTANEOUS at 00:11

## 2021-10-12 RX ADMIN — Medication 100 MILLIGRAM(S): at 14:02

## 2021-10-12 RX ADMIN — SODIUM CHLORIDE 60 MILLILITER(S): 9 INJECTION INTRAMUSCULAR; INTRAVENOUS; SUBCUTANEOUS at 00:01

## 2021-10-12 NOTE — DISCHARGE NOTE PROVIDER - NSDCFUADDINST_GEN_ALL_CORE_FT
WOUND CARE INSTRUCTIONS  1. Keep dressing clean, dry and intact until follow up.  2. Can be partial weight bearing to the heel in a surgical shoe.  3. Monitor for any signs of infection.  4. Patient is to follow up in [facility] with Dr. Lugo or Dr. Hampton within 3-5 days upon discharge. Please call as soon as discharged to make a follow up appt and let them know that it is specifically for a "post-op check".

## 2021-10-12 NOTE — BRIEF OPERATIVE NOTE - NSICDXBRIEFPREOP_GEN_ALL_CORE_FT
PRE-OP DIAGNOSIS:  Chronic diabetic ulcer of left foot determined by examination 12-Oct-2021 11:08:26  Loulou Campbell  Osteomyelitis 12-Oct-2021 11:08:46  Loulou Campbell  Dry gangrene 12-Oct-2021 11:08:55  Loulou Campbell

## 2021-10-12 NOTE — DISCHARGE NOTE PROVIDER - CARE PROVIDER_API CALL
Castillo Lugo (DPM)  Podiatric Medicine and Surgery  [address] [place] [address]  Phone: [phone]  Fax: [phone]  Follow Up Time:     Ag Moser  [facility]  [address] [facility] [address], [address]  [place] [address]  Phone: [phone]  Fax: [phone]  Follow Up Time:     Yung Arzate  CARDIOVASCULAR DISEASE  [address] [place] [address]  Phone: [phone]  Fax: [phone]  Follow Up Time:    Castillo Lugo (DPM)  Podiatric Medicine and Surgery  [address] [place] [address]  Phone: [phone]  Fax: [phone]  Follow Up Time:     Ag Moser  MEDICINE  [address] [facility] [address], [address]  [place] [address]  Phone: [phone]  Fax: [phone]  Follow Up Time:     Yung Arzate  CARDIOVASCULAR DISEASE  [address] [place] [address]  Phone: [phone]  Fax: [phone]  Follow Up Time:     Christine Matta)  Medical Oncology  [address] [facility], [address]  [place] [address]  Phone: [phone]  Fax: [phone]  Follow Up Time: 2 weeks

## 2021-10-12 NOTE — CONSULT NOTE ADULT - ASSESSMENT
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 Will follow creatinine trend on gentle IV hydration. Check cultures, IV abx.   Monitor blood sugar levels. Insulin coverage as needed. Dietary restriction. Monitor BP trend. Titrate BP meds as needed. Salt restriction. Will follow electrolytes and renal function trend. D/w patient regarding need for out patient nephrology follow up.   Further recommendations pending clinical course. Thank you for the courtesy of this referral.

## 2021-10-12 NOTE — PROGRESS NOTE ADULT - SUBJECTIVE AND OBJECTIVE BOX
[facility]  INFECTIOUS DISEASES   [address] [place] [address]  Tel: [phone]     Fax: [phone]  =======================================================    MRN-[id number]  NAVIN MALDONADO     Follow up:  Left first distal hallux osteomyelitis     He had partial amputation of his left first toe today. Feels fine.  No fever or any other complaint.     PAST MEDICAL & SURGICAL HISTORY:  Diabetes  High cholesterol  Hypertension  History of adenoidectomy    Social Hx: no smoking, ETOH or drugs     FAMILY HISTORY:  FHx: coronary artery disease (Father, Grandparent)    Allergies  No Known Allergies    Antibiotics:  one dose of ceftriaxone and vancomycin    REVIEW OF SYSTEMS:  CONSTITUTIONAL:  No Fever or chills  HEENT:  No diplopia or blurred vision.  No sore throat or runny nose.  CARDIOVASCULAR:  No chest pain or SOB.  RESPIRATORY:  No cough, shortness of breath, PND or orthopnea.  GASTROINTESTINAL:  No nausea, vomiting or diarrhea.  GENITOURINARY:  No dysuria, frequency or urgency. No Blood in urine  MUSCULOSKELETAL:  no joint aches, no muscle pain  SKIN:  No change in skin, hair or nails.  NEUROLOGIC:  No paresthesias, fasciculations, seizures or weakness.  PSYCHIATRIC:  No disorder of thought or mood.  ENDOCRINE:  No heat or cold intolerance, polyuria or polydipsia.  HEMATOLOGICAL:  No easy bruising or bleeding.     Physical Exam:  Vital Signs Last 24 Hrs  T(C): 36.4 (12 Oct 2021 12:47), Max: 36.6 (12 Oct 2021 11:53)  T(F): 97.5 (12 Oct 2021 12:47), Max: 97.9 (12 Oct 2021 11:53)  HR: 67 (12 Oct 2021 12:47) (66 - 76)  BP: 128/77 (12 Oct 2021 12:47) (107/54 - 137/80)  BP(mean): --  RR: 16 (12 Oct 2021 12:47) (13 - 18)  SpO2: 95% (12 Oct 2021 12:47) (92% - 99%)  GEN: NAD  HEENT: normocephalic and atraumatic. EOMI. PERRL.    NECK: Supple.  No lymphadenopathy   LUNGS: Clear to auscultation.  HEART: Regular rate and rhythm without murmur.  ABDOMEN: Soft, nontender, and nondistended.  Positive bowel sounds.    : No CVA tenderness  EXTREMITIES: left 1st toe with a dry ulcer on tip, deep with small plantar ulcer no discharge or sign of cellulitis, now dressed after surgery    NEUROLOGIC: grossly intact.  PSYCHIATRIC: Appropriate affect .  SKIN: No rash     Labs:                        12.5   20.26 )-----------( 156      ( 12 Oct 2021 09:17 )             38.9     10-12    138  |  108  |  29<H>  ----------------------------<  109<H>  4.4   |  25  |  1.50<H>    Ca    8.3<L>      12 Oct 2021 09:17    TPro  8.0  /  Alb  3.5  /  TBili  0.4  /  DBili  x   /  AST  15  /  ALT  25  /  AlkPhos  58  10-11    Culture - Blood (collected 10-11-21 @ 11:25)  Source: .Blood Blood-Peripheral    Culture - Blood (collected 10-11-21 @ 11:25)  Source: .Blood Blood-Peripheral    Culture - Other (collected 09-30-21 @ 01:47)  Source: .Other left hallux wound  Final Report (10-02-21 @ 09:01):    Numerous Staphylococcus aureus  Organism: Staphylococcus aureus (10-02-21 @ 09:01)  Organism: Staphylococcus aureus (10-02-21 @ 09:01)    Sensitivities:      -  Ampicillin/Sulbactam: S <=8/4      -  Cefazolin: S <=4      -  Clindamycin: S <=0.25      -  Erythromycin: S <=0.25      -  Gentamicin: S <=1 Should not be used as monotherapy      -  Oxacillin: S <=0.25      -  RIF- Rifampin: S <=1 Should not be used as monotherapy      -  Tetra/Doxy: S <=1      -  Trimethoprim/Sulfamethoxazole: S <=0.5/9.5      -  Vancomycin: S 1      Method Type: DORA    WBC Count: 20.26 K/uL (10-12-21 @ 09:17)  WBC Count: 24.36 K/uL (10-11-21 @ 09:32)    Creatinine, Serum: 1.50 mg/dL (10-12-21 @ 09:17)  Creatinine, Serum: 1.70 mg/dL (10-11-21 @ 09:32)    C-Reactive Protein, Serum: <3 mg/L (10-11-21 @ 10:40)    Sedimentation Rate, Erythrocyte: 11 mm/hr (10-11-21 @ 09:32)    COVID-19 PCR: NotDetec (10-11-21 @ 09:40)    All imaging and other data have been reviewed.  < from: MR Foot No Cont, Left (09.22.21 @ 10:33) >  Impression:  Soft tissue ulcer along the distal aspect of the first toe with findings concerning for osteomyelitis of the underlying first distal phalanx.  Scattered arthrosis throughout the midfoot and forefoot, as described above.  Small second webspace interdigital neuroma. Mild third webspace intermetatarsal bursitis.      Assessment and Plan:   76 y/o man with PMH of HTN, DM2, with peripheral neuropathy, was admitted with left first distal hallux osteomyelitis as per MRI on 9/22/21.  This in going on for one year.   MRI 9/22 with OM in first distal hallux   Leukocytosis 24k  Wound culture MSSA on 9/30   CRP<3   ESR=11   S/P Partial amputation of left 1st toe    1- Left hallux ulcer and OM  2- DM    Recommendations;   - Blood culture x 2 NGTD  - Continue cefazolin 2gm q8h   - Based on culture and pathology results will decide about the Abx regimen and length of treatment.   - Podiatry followup  - Appropriate glycemic control     Will follow.  Dr. Rachel is covering me for one week.     Rosita Foster MD  Division of Infectious Diseases   Cell [phone] between 8am and 6pm   After 6pm and weekends please call ID service at [phone].

## 2021-10-12 NOTE — DISCHARGE NOTE PROVIDER - PROVIDER TOKENS
PROVIDER:[TOKEN:[[id number]]],PROVIDER:[TOKEN:[[id number]]],PROVIDER:[TOKEN:[[id number]]] PROVIDER:[TOKEN:[[id number]]],PROVIDER:[TOKEN:[[id number]]],PROVIDER:[TOKEN:[[id number]]],PROVIDER:[TOKEN:[[id number]],FOLLOWUP:[2 weeks]]

## 2021-10-12 NOTE — DISCHARGE NOTE PROVIDER - NSDCMRMEDTOKEN_GEN_ALL_CORE_FT
aspirin 81 mg oral tablet: 1 tab(s) orally once a day  carvedilol 40 mg oral capsule, extended release: 1 cap(s) orally once a day (in the morning)  metFORMIN 500 mg oral tablet, extended release: 1 tab(s) orally 2 times a day  multivitamin: 1 tab(s) orally once a day  Ozempic 2 mg/1.5 mL (0.25 mg or 0.5 mg dose) subcutaneous solution: 0.5 milligram(s) subcutaneous once a week (Mondays)  ramipril 5 mg oral capsule: 1 cap(s) orally once a day  simvastatin 40 mg oral tablet: 1 tab(s) orally once a day (at bedtime)  Vitamin D3 25 mcg (1000 intl units) oral tablet: 1 tab(s) orally once a day

## 2021-10-12 NOTE — BRIEF OPERATIVE NOTE - NSICDXBRIEFPOSTOP_GEN_ALL_CORE_FT
POST-OP DIAGNOSIS:  Chronic diabetic ulcer of left foot determined by examination 12-Oct-2021 11:09:47  Loulou Campbell  Osteomyelitis 12-Oct-2021 11:10:14  Loulou Campbell  Dry gangrene 12-Oct-2021 11:10:21  Loulou Campbell

## 2021-10-12 NOTE — PROGRESS NOTE ADULT - ASSESSMENT
Patient is a 76 y/o M with pmhx T2DM, HTN, HLD, peripheral neuropathy, who presents with left first distal hallux osteomyelitis as per outpatient MRI 9/22/21. The patient states he has had infected L distal hallux for x1 year, worsening over the past 2-3months. Currently denies feeling any pain, no drainage, no change in gait.     Problem/Plan - 1: Osteomyelitis of great toe of left foot  - s/p left partial hallux amputation  - MRI L foot on 9/22: soft tissue ulcer along the distal aspect of the first toe with findings concerning for osteomyelitis of the underlying first distal phalanx. Small second webspace interdigital neuroma measuring 4 x 3 mm. Mild third webspace intermetatarsal bursitis.  - f/u L foot x-rays 3 views -No acute radiographic osseous pathology.No gross radiographic evidence of osteomyelitis of first hallux. There is hallux soft tissue swelling.  - ID consulted, Dr Foster - Bcx x 2, c/w Cefazolin 2gm 8q for MSSA     Problem/Plan - 2: Diabetes   - hold home Metformin, Ozempic (last dose Ozempic 10/10/21 pm)  - start low-dose sliding scale  - f/u A1c- 6.8 (10/12)  - hypoglycemia protocol in place     Problem/Plan - 3: Elevated serum creatinine  - this AM BUN 29, Cr 1.5, unknown baseline though Cr 1.46, 1.27 in 2017  - unclear CKD vs. CECILIA, Dr. Moser Nephrology consulted- f/r recs   - repeat BMP in AM  - hold ACE-i.     Problem/Plan - 4: High cholesterol   - continue simvastatin 40mg daily.     Problem/Plan - 5: Hypertension  - continue carvedilol 12.5mg q12h daily  - hold home ramipril due to elevated Cr     Problem/Plan - 6: DVT prophylaxis  - Heparin 5000 U subq q8 hours.

## 2021-10-12 NOTE — DISCHARGE NOTE PROVIDER - HOSPITAL COURSE
Patient is a 76 y/o M with pmhx T2DM, HTN, HLD, peripheral neuropathy, who presents with left first distal hallux osteomyelitis as per outpatient MRI 9/22/21. Pt saw podiatry today at wound care center. The patient states he has had infected L distal hallux for x1 year, worsening over the past 2-3months. Currently denies feeling any pain, no drainage, no change in gait. After seeing podiatry, derm, and wound care, he felt that after antibiotics and conservation management, infection failed to properly heal and "wants to get it taken care of" before worsens. Patient endorses having had similar lesions/infected toes in past but states they have always completed resolved, unlike this one. Endorses continued neuropathy from feet up to just below the knee bilaterally. Denies feeling pain (likely 2/2 neuropathy), gait or balance problems, fevers, chills, chest pain, SOB, leg swelling.   Denies past hx of CAD/MI/TIA/CVA/CHF. He can ambulate 2 flights of stairs without SOB or chest pain.     IN THE ED:  Temp  98.2 F, HR 78, /80, RR 16, SpO2 97%  S/P ceftriaxone IV 2g, vancomycin IV 1g   EKG: normal sinus rhythm 1st degree block, no acute ischemic changes  Labs significant for WBC 24.36 w/ lymphocytes 14.86, BUN 39, Cr 1.7, Glu 162, eGFR 39  Imaging: negative CXR, L foot MRI on 9/22 - osteomyelitis of L distal phalanx     Hospital Course:   Patient was admitted for left first distal hallux osteomyelitis. Left foot x-ray revealed no evidence of left hallux osteomyelitis. Patient was taken to the OR for left partial hallux amputation. Wound cultures were sent and resulted _______. Patient's home Metformin was held and started on low dose sliding scale. Patient was found to have elevated creatinine and home Ramipril was held. Nephrology Dr. Moser was consulted and reccomended gentle fluid hydration. Infectious disease Dr. Foster was consulted and recommended to start Cefazolin 2mg q8. Blood cultures were sent out and final results ________.     Consults:  Nephrology- Dr. Moser   Infectious Disease- Dr. Foster   Podiatry- Dr. Hampton Patient is a 74 y/o M with pmhx T2DM, HTN, HLD, peripheral neuropathy, who presents with left first distal hallux osteomyelitis as per outpatient MRI 9/22/21. Pt saw podiatry today at wound care center. The patient states he has had infected L distal hallux for x1 year, worsening over the past 2-3months. Currently denies feeling any pain, no drainage, no change in gait. After seeing podiatry, derm, and wound care, he felt that after antibiotics and conservation management, infection failed to properly heal and "wants to get it taken care of" before worsens. Patient endorses having had similar lesions/infected toes in past but states they have always completed resolved, unlike this one. Endorses continued neuropathy from feet up to just below the knee bilaterally. Denies feeling pain (likely 2/2 neuropathy), gait or balance problems, fevers, chills, chest pain, SOB, leg swelling.   Denies past hx of CAD/MI/TIA/CVA/CHF. He can ambulate 2 flights of stairs without SOB or chest pain.     IN THE ED:  Temp  98.2 F, HR 78, /80, RR 16, SpO2 97%  S/P ceftriaxone IV 2g, vancomycin IV 1g   EKG: normal sinus rhythm 1st degree block, no acute ischemic changes  Labs significant for WBC 24.36 w/ lymphocytes 14.86, BUN 39, Cr 1.7, Glu 162, eGFR 39  Imaging: negative CXR, L foot MRI on 9/22 - osteomyelitis of L distal phalanx     Hospital Course:   Patient was admitted for left first distal hallux osteomyelitis. Left foot x-ray revealed no evidence of left hallux osteomyelitis. Patient was taken to the OR for left partial hallux amputation. Wound cultures were sent prior to OR and resulted numerous staph arueus . OR cultures were NGTD and bone pathology revealed clean margins. Patient's home Metformin was held and started on low dose sliding scale. Patient was found to have elevated creatinine and home Ramipril was held. Nephrology Dr. Moser was consulted and reccomended gentle fluid hydration, . Infectious disease Dr. Foster was consulted and recommended to start Cefazolin 2mg q8. Blood cultures were sent out and final results ________.     Consults:  Nephrology- Dr. Moser   Infectious Disease- Dr. Foster   Podiatry- Dr. Hampton Patient is a 76 y/o M with pmhx T2DM, HTN, HLD, peripheral neuropathy, who presents with left first distal hallux osteomyelitis as per outpatient MRI 9/22/21. Pt saw podiatry today at wound care center. The patient states he has had infected L distal hallux for x1 year, worsening over the past 2-3months. Currently denies feeling any pain, no drainage, no change in gait. After seeing podiatry, derm, and wound care, he felt that after antibiotics and conservation management, infection failed to properly heal and "wants to get it taken care of" before worsens. Patient endorses having had similar lesions/infected toes in past but states they have always completed resolved, unlike this one. Endorses continued neuropathy from feet up to just below the knee bilaterally. Denies feeling pain (likely 2/2 neuropathy), gait or balance problems, fevers, chills, chest pain, SOB, leg swelling.   Denies past hx of CAD/MI/TIA/CVA/CHF. He can ambulate 2 flights of stairs without SOB or chest pain.     IN THE ED:  Temp  98.2 F, HR 78, /80, RR 16, SpO2 97%  S/P ceftriaxone IV 2g, vancomycin IV 1g   EKG: normal sinus rhythm 1st degree block, no acute ischemic changes  Labs significant for WBC 24.36 w/ lymphocytes 14.86, BUN 39, Cr 1.7, Glu 162, eGFR 39  Imaging: negative CXR, L foot MRI on 9/22 - osteomyelitis of L distal phalanx     Hospital Course:   Patient was admitted for left first distal hallux osteomyelitis. Left foot x-ray revealed no evidence of left hallux osteomyelitis. Patient was taken to the OR for left partial hallux amputation. Wound cultures were sent prior to OR and resulted numerous staph arueus . OR cultures were NGTD and bone pathology revealed clean margins. Patient's home Metformin was held and started on low dose sliding scale. Patient was found to have elevated creatinine and home Ramipril was held. Nephrology Dr. Moser was consulted and recommended gentle fluid hydration. Likely stable CKD3, creatine remained stable. Infectious disease Dr. Foster was consulted and recommended to start Cefazolin 2mg q8. Blood cultures were sent out and final results negative. WBC remained elevated, likely chronic elevation, will need outpatient heme/onc visit. Patient hemodynamically stable and medically optimized for DC with outpatient follow up.    T(C): 36.6 (10-15-21 @ 05:26), Max: 36.8 (10-14-21 @ 20:24)  HR: 73 (10-15-21 @ 05:26) (73 - 76)  BP: 118/72 (10-15-21 @ 05:26) (107/69 - 134/62)  RR: 17 (10-15-21 @ 05:26) (16 - 17)  SpO2: 92% (10-15-21 @ 05:26) (92% - 97%)    Physical Exam:  Gen: well appearing, NAD  HEENT: NCAT, PEERLA b/l, EOMI b/l, no conjunctival erythema  Cardio: regular rate and rhythm, +s1s2, no murmurs, rubs, or gallops  Pulm: CTA b/l, no wheezes, rales or rhonchi  Abdomen: soft, nontender, nondistended, +BS x4 quadrants, no guarding  Extremities: no clubbing, cyanosis or edema, +2 pedal pulses  Neuro: AAOx3  Skin: warm and dry      .     Consults:  Nephrology- Dr. Moser   Infectious Disease- Dr. Foster   Podiatry- Dr. Hampton Patient is a 76 y/o M with pmhx T2DM, HTN, HLD, peripheral neuropathy, who presents with left first distal hallux osteomyelitis as per outpatient MRI 9/22/21. Pt saw podiatry today at wound care center. The patient states he has had infected L distal hallux for x1 year, worsening over the past 2-3months. Currently denies feeling any pain, no drainage, no change in gait. After seeing podiatry, derm, and wound care, he felt that after antibiotics and conservation management, infection failed to properly heal and "wants to get it taken care of" before worsens. Patient endorses having had similar lesions/infected toes in past but states they have always completed resolved, unlike this one. Endorses continued neuropathy from feet up to just below the knee bilaterally. Denies feeling pain (likely 2/2 neuropathy), gait or balance problems, fevers, chills, chest pain, SOB, leg swelling.   Denies past hx of CAD/MI/TIA/CVA/CHF. He can ambulate 2 flights of stairs without SOB or chest pain.     IN THE ED:  Temp  98.2 F, HR 78, /80, RR 16, SpO2 97%  S/P ceftriaxone IV 2g, vancomycin IV 1g   EKG: normal sinus rhythm 1st degree block, no acute ischemic changes  Labs significant for WBC 24.36 w/ lymphocytes 14.86, BUN 39, Cr 1.7, Glu 162, eGFR 39  Imaging: negative CXR, L foot MRI on 9/22 - osteomyelitis of L distal phalanx     Hospital Course:   Patient was admitted for left first distal hallux osteomyelitis. Left foot x-ray revealed no evidence of left hallux osteomyelitis. Patient was taken to the OR for left partial hallux amputation. Wound cultures were sent prior to OR and resulted numerous staph arueus . OR cultures were NGTD and bone pathology revealed clean margins. Patient's home Metformin was held and started on low dose sliding scale. Patient was found to have elevated creatinine and home Ramipril was held. Nephrology Dr. Moser was consulted and recommended gentle fluid hydration. Likely stable CKD3, creatine remained stable. Infectious disease Dr. Foster was consulted and recommended to start Cefazolin 2mg q8. Blood cultures were sent out and final results negative. WBC remained elevated, likely chronic elevation, will need outpatient heme/onc visit. Patient hemodynamically stable and medically optimized for DC with outpatient follow up.    T(C): 36.6 (10-15-21 @ 05:26), Max: 36.8 (10-14-21 @ 20:24)  HR: 73 (10-15-21 @ 05:26) (73 - 76)  BP: 118/72 (10-15-21 @ 05:26) (107/69 - 134/62)  RR: 17 (10-15-21 @ 05:26) (16 - 17)  SpO2: 92% (10-15-21 @ 05:26) (92% - 97%)    Physical Exam:  Gen: well appearing, NAD  HEENT: NCAT, PEERLA b/l, EOMI b/l, no conjunctival erythema  Cardio: regular rate and rhythm, +s1s2, no murmurs, rubs, or gallops  Pulm: CTA b/l, no wheezes, rales or rhonchi  Abdomen: soft, nontender, nondistended, +BS x4 quadrants, no guarding  Extremities: left foot dressings c/d/i  Neuro: AAOx3  Skin: warm and dry      .     Consults:  Nephrology- Dr. Moser   Infectious Disease- Dr. Foster   Podiatry- Dr. Hampton Mannie Henderson(Attending) Patient is a 76 y/o M with pmhx T2DM, HTN, HLD, peripheral neuropathy, who presents with left first distal hallux osteomyelitis as per outpatient MRI 9/22/21. Pt saw podiatry today at wound care center. The patient states he has had infected L distal hallux for x1 year, worsening over the past 2-3months. Currently denies feeling any pain, no drainage, no change in gait. After seeing podiatry, derm, and wound care, he felt that after antibiotics and conservation management, infection failed to properly heal and "wants to get it taken care of" before worsens. Patient endorses having had similar lesions/infected toes in past but states they have always completed resolved, unlike this one. Endorses continued neuropathy from feet up to just below the knee bilaterally. Denies feeling pain (likely 2/2 neuropathy), gait or balance problems, fevers, chills, chest pain, SOB, leg swelling.   Denies past hx of CAD/MI/TIA/CVA/CHF. He can ambulate 2 flights of stairs without SOB or chest pain.     IN THE ED:  Temp  98.2 F, HR 78, /80, RR 16, SpO2 97%  S/P ceftriaxone IV 2g, vancomycin IV 1g   EKG: normal sinus rhythm 1st degree block, no acute ischemic changes  Labs significant for WBC 24.36 w/ lymphocytes 14.86, BUN 39, Cr 1.7, Glu 162, eGFR 39  Imaging: negative CXR, L foot MRI on 9/22 - osteomyelitis of L distal phalanx     Hospital Course:   Patient was admitted for left first distal hallux osteomyelitis. Left foot x-ray revealed no evidence of left hallux osteomyelitis. Patient was taken to the OR for left partial hallux amputation. Wound cultures were sent prior to OR and resulted numerous staph arueus . OR cultures were NGTD and bone pathology revealed clean margins. Patient's home Metformin was held and started on low dose sliding scale. Patient was found to have elevated creatinine and home Ramipril was held. Nephrology Dr. Moser was consulted and recommended gentle fluid hydration. Likely stable CKD3, creatine remained stable. Infectious disease Dr. Foster was consulted and recommended to start Cefazolin 2mg q8. Blood cultures were sent out and final results negative. WBC remained elevated, likely chronic elevation, will need outpatient heme/onc visit. Patient hemodynamically stable and medically optimized for DC with outpatient follow up.    T(C): 36.6 (10-15-21 @ 05:26), Max: 36.8 (10-14-21 @ 20:24)  HR: 73 (10-15-21 @ 05:26) (73 - 76)  BP: 118/72 (10-15-21 @ 05:26) (107/69 - 134/62)  RR: 17 (10-15-21 @ 05:26) (16 - 17)  SpO2: 92% (10-15-21 @ 05:26) (92% - 97%)    Physical Exam:  Gen: well appearing, NAD  HEENT: NCAT, PEERLA b/l, EOMI b/l, no conjunctival erythema  Cardio: regular rate and rhythm, +s1s2, no murmurs, rubs, or gallops  Pulm: CTA b/l, no wheezes, rales or rhonchi  Abdomen: soft, nontender, nondistended, +BS x4 quadrants, no guarding  Extremities: left foot dressings c/d/i  Neuro: AAOx3  Skin: warm and dry      .     Consults:  Nephrology- Dr. Moser   Infectious Disease- Dr. Foster   Podiatry- Dr. Hampton     Total time spent on discharge including coordination of care by attending physician: 44 minutes

## 2021-10-12 NOTE — BRIEF OPERATIVE NOTE - SPECIMENS
Left hallux distal phalanx bone culture and pathology, left hallux proximal phalanx pathology (clean margin)

## 2021-10-12 NOTE — PROGRESS NOTE ADULT - SUBJECTIVE AND OBJECTIVE BOX
Patient is a 75y old  Male who presents with a chief complaint of Left first distal hallux osteomyelitis (12 Oct 2021 10:25)      INTERVAL HPI/OVERNIGHT EVENTS: Patient was seen and examined at bedside. Patient was being taken into OR. Patient stable and AAOx3.     MEDICATIONS  (STANDING):  carvedilol 12.5 milliGRAM(s) Oral every 12 hours  dextrose 40% Gel 15 Gram(s) Oral once  dextrose 5%. 1000 milliLiter(s) (100 mL/Hr) IV Continuous <Continuous>  dextrose 5%. 1000 milliLiter(s) (50 mL/Hr) IV Continuous <Continuous>  dextrose 50% Injectable 12.5 Gram(s) IV Push once  dextrose 50% Injectable 25 Gram(s) IV Push once  glucagon  Injectable 1 milliGRAM(s) IntraMuscular once  insulin lispro (ADMELOG) corrective regimen sliding scale   SubCutaneous three times a day before meals  lactated ringers. 1000 milliLiter(s) (75 mL/Hr) IV Continuous <Continuous>  melatonin 3 milliGRAM(s) Oral at bedtime  simvastatin 40 milliGRAM(s) Oral at bedtime    MEDICATIONS  (PRN):  acetaminophen   Tablet .. 650 milliGRAM(s) Oral every 6 hours PRN Temp greater or equal to 38C (100.4F), Mild Pain (1 - 3)  HYDROmorphone  Injectable 0.5 milliGRAM(s) IV Push every 10 minutes PRN Severe Pain (7 - 10)  ondansetron Injectable 4 milliGRAM(s) IV Push once PRN Nausea and/or Vomiting  oxyCODONE    IR 5 milliGRAM(s) Oral once PRN Moderate Pain (4 - 6)      Allergies    No Known Allergies    Intolerances        REVIEW OF SYSTEMS:  CONSTITUTIONAL: No fever or chills  HEENT:  No headache, no sore throat  RESPIRATORY: No cough, wheezing, or shortness of breath  CARDIOVASCULAR: No chest pain, palpitations  GASTROINTESTINAL: No abd pain, nausea, vomiting, or diarrhea  GENITOURINARY: No dysuria, frequency, or hematuria  NEUROLOGICAL: no focal weakness or dizziness  MUSCULOSKELETAL: no myalgias     Vital Signs Last 24 Hrs  T(C): 36.6 (12 Oct 2021 11:53), Max: 36.9 (11 Oct 2021 14:01)  T(F): 97.9 (12 Oct 2021 11:53), Max: 98.4 (11 Oct 2021 14:01)  HR: 75 (12 Oct 2021 11:53) (66 - 80)  BP: 124/63 (12 Oct 2021 11:53) (107/54 - 142/76)  BP(mean): --  RR: 15 (12 Oct 2021 11:53) (13 - 18)  SpO2: 99% (12 Oct 2021 11:53) (92% - 99%)    PHYSICAL EXAM:  GENERAL: NAD  HEENT:  anicteric, moist mucous membranes  CHEST/LUNG:  CTA b/l, no rales, wheezes, or rhonchi  HEART:  RRR, S1, S2  ABDOMEN:  BS+, soft, nontender, nondistended  EXTREMITIES: great toe distal phalanx with plantar lesion 2x3cm. DP pulse 2+ on RLE, unable to palpate DP or PT on L foot.  NERVOUS SYSTEM: answers questions and follows commands appropriately    LABS:                        12.5   20.26 )-----------( 156      ( 12 Oct 2021 09:17 )             38.9     CBC Full  -  ( 12 Oct 2021 09:17 )  WBC Count : 20.26 K/uL  Hemoglobin : 12.5 g/dL  Hematocrit : 38.9 %  Platelet Count - Automated : 156 K/uL  Mean Cell Volume : 88.2 fl  Mean Cell Hemoglobin : 28.3 pg  Mean Cell Hemoglobin Concentration : 32.1 gm/dL  Auto Neutrophil # : x  Auto Lymphocyte # : x  Auto Monocyte # : x  Auto Eosinophil # : x  Auto Basophil # : x  Auto Neutrophil % : x  Auto Lymphocyte % : x  Auto Monocyte % : x  Auto Eosinophil % : x  Auto Basophil % : x    12 Oct 2021 09:17    138    |  108    |  29     ----------------------------<  109    4.4     |  25     |  1.50     Ca    8.3        12 Oct 2021 09:17      PT/INR - ( 11 Oct 2021 09:32 )   PT: 12.5 sec;   INR: 1.07 ratio         PTT - ( 11 Oct 2021 09:32 )  PTT:33.5 sec    CAPILLARY BLOOD GLUCOSE      POCT Blood Glucose.: 116 mg/dL (12 Oct 2021 10:58)  POCT Blood Glucose.: 127 mg/dL (12 Oct 2021 09:01)  POCT Blood Glucose.: 132 mg/dL (12 Oct 2021 07:50)  POCT Blood Glucose.: 113 mg/dL (12 Oct 2021 06:00)  POCT Blood Glucose.: 105 mg/dL (11 Oct 2021 22:02)  POCT Blood Glucose.: 119 mg/dL (11 Oct 2021 17:18)        Culture - Blood (collected 10-11-21 @ 11:25)  Source: .Blood Blood-Peripheral  Preliminary Report (10-12-21 @ 12:01):    No growth to date.    Culture - Blood (collected 10-11-21 @ 11:25)  Source: .Blood Blood-Peripheral  Preliminary Report (10-12-21 @ 12:01):    No growth to date.        RADIOLOGY & ADDITIONAL TESTS:    Personally reviewed.     Consultant(s) Notes Reviewed:  [x] YES  [ ] NO     Patient is a 75y old  Male who presents with a chief complaint of Left first distal hallux osteomyelitis (12 Oct 2021 10:25)      INTERVAL HPI/OVERNIGHT EVENTS: Patient was seen and examined at bedside. Patient was being taken into OR. Patient stable and AAOx3.     MEDICATIONS  (STANDING):  carvedilol 12.5 milliGRAM(s) Oral every 12 hours  dextrose 40% Gel 15 Gram(s) Oral once  dextrose 5%. 1000 milliLiter(s) (100 mL/Hr) IV Continuous <Continuous>  dextrose 5%. 1000 milliLiter(s) (50 mL/Hr) IV Continuous <Continuous>  dextrose 50% Injectable 12.5 Gram(s) IV Push once  dextrose 50% Injectable 25 Gram(s) IV Push once  glucagon  Injectable 1 milliGRAM(s) IntraMuscular once  insulin lispro (ADMELOG) corrective regimen sliding scale   SubCutaneous three times a day before meals  lactated ringers. 1000 milliLiter(s) (75 mL/Hr) IV Continuous <Continuous>  melatonin 3 milliGRAM(s) Oral at bedtime  simvastatin 40 milliGRAM(s) Oral at bedtime    MEDICATIONS  (PRN):  acetaminophen   Tablet .. 650 milliGRAM(s) Oral every 6 hours PRN Temp greater or equal to 38C (100.4F), Mild Pain (1 - 3)  HYDROmorphone  Injectable 0.5 milliGRAM(s) IV Push every 10 minutes PRN Severe Pain (7 - 10)  ondansetron Injectable 4 milliGRAM(s) IV Push once PRN Nausea and/or Vomiting  oxyCODONE    IR 5 milliGRAM(s) Oral once PRN Moderate Pain (4 - 6)      Allergies    No Known Allergies    Intolerances        REVIEW OF SYSTEMS:  CONSTITUTIONAL: No fever or chills  HEENT:  No headache, no sore throat  RESPIRATORY: No cough, wheezing, or shortness of breath  CARDIOVASCULAR: No chest pain, palpitations  GASTROINTESTINAL: No abd pain, nausea, vomiting, or diarrhea  GENITOURINARY: No dysuria, frequency, or hematuria  NEUROLOGICAL: no focal weakness or dizziness  MUSCULOSKELETAL: no myalgias     Vital Signs Last 24 Hrs  T(C): 36.6 (12 Oct 2021 11:53), Max: 36.9 (11 Oct 2021 14:01)  T(F): 97.9 (12 Oct 2021 11:53), Max: 98.4 (11 Oct 2021 14:01)  HR: 75 (12 Oct 2021 11:53) (66 - 80)  BP: 124/63 (12 Oct 2021 11:53) (107/54 - 142/76)  BP(mean): --  RR: 15 (12 Oct 2021 11:53) (13 - 18)  SpO2: 99% (12 Oct 2021 11:53) (92% - 99%)    PHYSICAL EXAM:  GENERAL: NAD  HEENT:  anicteric, moist mucous membranes  CHEST/LUNG:  CTA b/l, no rales, wheezes, or rhonchi  HEART:  RRR, S1, S2  ABDOMEN:  BS+, soft, nontender, nondistended  EXTREMITIES: great toe distal phalanx with plantar lesion 2x3cm. DP pulse 2+ on RLE, unable to palpate DP or PT on L foot.  NERVOUS SYSTEM: answers questions and follows commands appropriately    LABS:                        12.5   20.26 )-----------( 156      ( 12 Oct 2021 09:17 )             38.9     CBC Full  -  ( 12 Oct 2021 09:17 )  WBC Count : 20.26 K/uL  Hemoglobin : 12.5 g/dL  Hematocrit : 38.9 %  Platelet Count - Automated : 156 K/uL  Mean Cell Volume : 88.2 fl  Mean Cell Hemoglobin : 28.3 pg  Mean Cell Hemoglobin Concentration : 32.1 gm/dL  Auto Neutrophil # : x  Auto Lymphocyte # : x  Auto Monocyte # : x  Auto Eosinophil # : x  Auto Basophil # : x  Auto Neutrophil % : x  Auto Lymphocyte % : x  Auto Monocyte % : x  Auto Eosinophil % : x  Auto Basophil % : x    12 Oct 2021 09:17    138    |  108    |  29     ----------------------------<  109    4.4     |  25     |  1.50     Ca    8.3        12 Oct 2021 09:17      PT/INR - ( 11 Oct 2021 09:32 )   PT: 12.5 sec;   INR: 1.07 ratio         PTT - ( 11 Oct 2021 09:32 )  PTT:33.5 sec    CAPILLARY BLOOD GLUCOSE      POCT Blood Glucose.: 116 mg/dL (12 Oct 2021 10:58)  POCT Blood Glucose.: 127 mg/dL (12 Oct 2021 09:01)  POCT Blood Glucose.: 132 mg/dL (12 Oct 2021 07:50)  POCT Blood Glucose.: 113 mg/dL (12 Oct 2021 06:00)  POCT Blood Glucose.: 105 mg/dL (11 Oct 2021 22:02)  POCT Blood Glucose.: 119 mg/dL (11 Oct 2021 17:18)        Culture - Blood (collected 10-11-21 @ 11:25)  Source: .Blood Blood-Peripheral  Preliminary Report (10-12-21 @ 12:01):    No growth to date.    Culture - Blood (collected 10-11-21 @ 11:25)  Source: .Blood Blood-Peripheral  Preliminary Report (10-12-21 @ 12:01):    No growth to date.        RADIOLOGY & ADDITIONAL TESTS:     Personally reviewed.   < from: Xray Chest 2 Views PA/Lat (10.11.21 @ 10:28) >  EXAM:  XR CHEST PA LAT 2V                            PROCEDURE DATE:  10/11/2021          INTERPRETATION:  PA and lateral chest on October 11, 2021 at 10:25 AM. Patient is scheduled for admission.    Heart size is within normal limits.    The lung fields and pleural surfaces are unremarkable.    Chest is similar to April 4, 2017.    IMPRESSION: Negative chest.    --- End of Report ---            LIBRA PAZ MD; Attending Radiologist  This document has been electronically signed. Oct 11 2021 10:43AM    < end of copied text >    Consultant(s) Notes Reviewed:  [x] YES  [ ] NO

## 2021-10-12 NOTE — DISCHARGE NOTE PROVIDER - CARE PROVIDERS DIRECT ADDRESSES
,[email or web address],DirectAddress_Unknown,DirectAddress_Unknown [email or web address],DirectAddress_Unknown,DirectAddress_Unknown,DirectAddress_Unknown

## 2021-10-12 NOTE — PROGRESS NOTE ADULT - SUBJECTIVE AND OBJECTIVE BOX
HOD # 1    SUBJECTIVE:  76 y/o M seen and examined at bedside with no acute overnight events. Pt denies chest pain, SOB, palpitations, fevers, chills, melena, or hematochezia.     Vital Signs Last 24 Hrs  T(C): 36.4 (12 Oct 2021 05:30), Max: 36.9 (11 Oct 2021 14:01)  T(F): 97.6 (12 Oct 2021 05:30), Max: 98.4 (11 Oct 2021 14:01)  HR: 71 (12 Oct 2021 05:30) (66 - 80)  BP: 137/80 (12 Oct 2021 05:30) (121/73 - 142/76)  BP(mean): --  RR: 18 (12 Oct 2021 05:30) (16 - 18)  SpO2: 94% (12 Oct 2021 05:30) (92% - 98%)    PHYSICAL EXAM:  Physical Examination:  GENERAL: No acute distress, well-developed  EXTREMITIES: +doppler dp/pt b/l, left first toe with gangrenous changes     I&O's Summary    I&O's Detail    MEDICATIONS  (STANDING):  aspirin  chewable 81 milliGRAM(s) Oral daily  carvedilol 12.5 milliGRAM(s) Oral every 12 hours  ceFAZolin   IVPB 2000 milliGRAM(s) IV Intermittent every 8 hours  dextrose 40% Gel 15 Gram(s) Oral once  dextrose 5%. 1000 milliLiter(s) (50 mL/Hr) IV Continuous <Continuous>  dextrose 5%. 1000 milliLiter(s) (100 mL/Hr) IV Continuous <Continuous>  dextrose 50% Injectable 25 Gram(s) IV Push once  dextrose 50% Injectable 12.5 Gram(s) IV Push once  dextrose 50% Injectable 25 Gram(s) IV Push once  glucagon  Injectable 1 milliGRAM(s) IntraMuscular once  heparin   Injectable 5000 Unit(s) SubCutaneous every 8 hours  insulin lispro (ADMELOG) corrective regimen sliding scale   SubCutaneous three times a day before meals  insulin lispro (ADMELOG) corrective regimen sliding scale   SubCutaneous at bedtime  melatonin 3 milliGRAM(s) Oral at bedtime  simvastatin 40 milliGRAM(s) Oral at bedtime  sodium chloride 0.9%. 1000 milliLiter(s) (60 mL/Hr) IV Continuous <Continuous>    MEDICATIONS  (PRN):  acetaminophen   Tablet .. 650 milliGRAM(s) Oral every 6 hours PRN Temp greater or equal to 38C (100.4F), Mild Pain (1 - 3)    LABS:                        13.8   24.36 )-----------( 191      ( 11 Oct 2021 09:32 )             42.2     10-11    138  |  109<H>  |  39<H>  ----------------------------<  162<H>  4.9   |  24  |  1.70<H>    Ca    8.8      11 Oct 2021 09:32    TPro  8.0  /  Alb  3.5  /  TBili  0.4  /  DBili  x   /  AST  15  /  ALT  25  /  AlkPhos  58  10-11    PT/INR - ( 11 Oct 2021 09:32 )   PT: 12.5 sec;   INR: 1.07 ratio         PTT - ( 11 Oct 2021 09:32 )  PTT:33.5 sec      ASSESSMENT  76 y/o M admitted with OM left first distal hallux, to OR today with podiatry,     PLAN  - f/u arterial studies, further recs to follow pending studies  - care per primary team  - care per podiatry   - day team to follow up with Dr. Grant    Surgical Team Contact Information  Spectralink: Ext: [phone] or [phone]  Pager: [phone]

## 2021-10-12 NOTE — DISCHARGE NOTE PROVIDER - NSDCCPCAREPLAN_GEN_ALL_CORE_FT
PRINCIPAL DISCHARGE DIAGNOSIS  Diagnosis: Osteomyelitis of toe  Assessment and Plan of Treatment: You were found to have osteomyelitis of your toe and had a left partial hallux amputation on 10/14/21  Final bone pathology from the OR showed clean margins and you do not require any further antibiotics  WOUND CARE INSTRUCTIONS  1. Keep dressing clean, dry and intact until follow up.  2. Can be partial weight bearing to the heel in a surgical shoe.  3. Monitor for any signs of infection.  4. Patient is to [facility] with Dr. Lugo or Dr. Hampton within 3-5 days upon discharge. Please call as soon as discharged to make a follow up appt and let them know that it is specifically for a "post-op check".      SECONDARY DISCHARGE DIAGNOSES  Diagnosis: Leukocytosis  Assessment and Plan of Treatment: You were found to have an elevated white blood cell count that remained elevated even after your infection was treated.   You should make an appointment to see hematology for further evaluation    Diagnosis: Stage 3 chronic kidney disease  Assessment and Plan of Treatment: You were found to have elevated creatine. It is likely that you have chronic kidney disease in the setting of diabetes  -Please make an appointment to follow up with nephrology

## 2021-10-12 NOTE — CONSULT NOTE ADULT - REASON FOR ADMISSION
Left first distal hallux osteomyelitis
Chronic left distal hallux ulcer
Left first distal hallux osteomyelitis
Left first distal hallux osteomyelitis

## 2021-10-13 LAB
ALBUMIN SERPL ELPH-MCNC: 3.1 G/DL — LOW (ref 3.3–5)
ALP SERPL-CCNC: 51 U/L — SIGNIFICANT CHANGE UP (ref 40–120)
ALT FLD-CCNC: 15 U/L — SIGNIFICANT CHANGE UP (ref 12–78)
ANION GAP SERPL CALC-SCNC: 4 MMOL/L — LOW (ref 5–17)
AST SERPL-CCNC: 18 U/L — SIGNIFICANT CHANGE UP (ref 15–37)
BASOPHILS # BLD AUTO: 0.09 K/UL — SIGNIFICANT CHANGE UP (ref 0–0.2)
BASOPHILS NFR BLD AUTO: 0.4 % — SIGNIFICANT CHANGE UP (ref 0–2)
BILIRUB SERPL-MCNC: 0.3 MG/DL — SIGNIFICANT CHANGE UP (ref 0.2–1.2)
BUN SERPL-MCNC: 23 MG/DL — SIGNIFICANT CHANGE UP (ref 7–23)
CALCIUM SERPL-MCNC: 8.4 MG/DL — LOW (ref 8.5–10.1)
CHLORIDE SERPL-SCNC: 107 MMOL/L — SIGNIFICANT CHANGE UP (ref 96–108)
CO2 SERPL-SCNC: 27 MMOL/L — SIGNIFICANT CHANGE UP (ref 22–31)
CREAT SERPL-MCNC: 1.7 MG/DL — HIGH (ref 0.5–1.3)
EOSINOPHIL # BLD AUTO: 0.33 K/UL — SIGNIFICANT CHANGE UP (ref 0–0.5)
EOSINOPHIL NFR BLD AUTO: 1.5 % — SIGNIFICANT CHANGE UP (ref 0–6)
GLUCOSE SERPL-MCNC: 110 MG/DL — HIGH (ref 70–99)
HCT VFR BLD CALC: 40.8 % — SIGNIFICANT CHANGE UP (ref 39–50)
HGB BLD-MCNC: 13 G/DL — SIGNIFICANT CHANGE UP (ref 13–17)
IMM GRANULOCYTES NFR BLD AUTO: 0.3 % — SIGNIFICANT CHANGE UP (ref 0–1.5)
LYMPHOCYTES # BLD AUTO: 15.83 K/UL — HIGH (ref 1–3.3)
LYMPHOCYTES # BLD AUTO: 71.4 % — HIGH (ref 13–44)
MCHC RBC-ENTMCNC: 28.4 PG — SIGNIFICANT CHANGE UP (ref 27–34)
MCHC RBC-ENTMCNC: 31.9 GM/DL — LOW (ref 32–36)
MCV RBC AUTO: 89.1 FL — SIGNIFICANT CHANGE UP (ref 80–100)
MONOCYTES # BLD AUTO: 1.03 K/UL — HIGH (ref 0–0.9)
MONOCYTES NFR BLD AUTO: 4.6 % — SIGNIFICANT CHANGE UP (ref 2–14)
NEUTROPHILS # BLD AUTO: 4.83 K/UL — SIGNIFICANT CHANGE UP (ref 1.8–7.4)
NEUTROPHILS NFR BLD AUTO: 21.8 % — LOW (ref 43–77)
NRBC # BLD: 0 /100 WBCS — SIGNIFICANT CHANGE UP (ref 0–0)
PLATELET # BLD AUTO: 165 K/UL — SIGNIFICANT CHANGE UP (ref 150–400)
POTASSIUM SERPL-MCNC: 4.8 MMOL/L — SIGNIFICANT CHANGE UP (ref 3.5–5.3)
POTASSIUM SERPL-SCNC: 4.8 MMOL/L — SIGNIFICANT CHANGE UP (ref 3.5–5.3)
PROT SERPL-MCNC: 7.1 G/DL — SIGNIFICANT CHANGE UP (ref 6–8.3)
RBC # BLD: 4.58 M/UL — SIGNIFICANT CHANGE UP (ref 4.2–5.8)
RBC # FLD: 14.8 % — HIGH (ref 10.3–14.5)
SODIUM SERPL-SCNC: 138 MMOL/L — SIGNIFICANT CHANGE UP (ref 135–145)
WBC # BLD: 22.17 K/UL — HIGH (ref 3.8–10.5)
WBC # FLD AUTO: 22.17 K/UL — HIGH (ref 3.8–10.5)

## 2021-10-13 PROCEDURE — 93923 UPR/LXTR ART STDY 3+ LVLS: CPT | Mod: 26

## 2021-10-13 PROCEDURE — 99233 SBSQ HOSP IP/OBS HIGH 50: CPT | Mod: GC

## 2021-10-13 RX ORDER — SENNA PLUS 8.6 MG/1
2 TABLET ORAL AT BEDTIME
Refills: 0 | Status: DISCONTINUED | OUTPATIENT
Start: 2021-10-13 | End: 2021-10-15

## 2021-10-13 RX ORDER — HYDROMORPHONE HYDROCHLORIDE 2 MG/ML
1 INJECTION INTRAMUSCULAR; INTRAVENOUS; SUBCUTANEOUS ONCE
Refills: 0 | Status: DISCONTINUED | OUTPATIENT
Start: 2021-10-13 | End: 2021-10-13

## 2021-10-13 RX ORDER — POLYETHYLENE GLYCOL 3350 17 G/17G
17 POWDER, FOR SOLUTION ORAL ONCE
Refills: 0 | Status: COMPLETED | OUTPATIENT
Start: 2021-10-13 | End: 2021-10-13

## 2021-10-13 RX ORDER — SENNA PLUS 8.6 MG/1
1 TABLET ORAL DAILY
Refills: 0 | Status: DISCONTINUED | OUTPATIENT
Start: 2021-10-13 | End: 2021-10-15

## 2021-10-13 RX ADMIN — Medication 100 MILLIGRAM(S): at 21:24

## 2021-10-13 RX ADMIN — CARVEDILOL PHOSPHATE 12.5 MILLIGRAM(S): 80 CAPSULE, EXTENDED RELEASE ORAL at 06:34

## 2021-10-13 RX ADMIN — Medication 650 MILLIGRAM(S): at 20:09

## 2021-10-13 RX ADMIN — SENNA PLUS 2 TABLET(S): 8.6 TABLET ORAL at 21:24

## 2021-10-13 RX ADMIN — HEPARIN SODIUM 5000 UNIT(S): 5000 INJECTION INTRAVENOUS; SUBCUTANEOUS at 21:24

## 2021-10-13 RX ADMIN — Medication 100 MILLIGRAM(S): at 06:33

## 2021-10-13 RX ADMIN — HYDROMORPHONE HYDROCHLORIDE 1 MILLIGRAM(S): 2 INJECTION INTRAMUSCULAR; INTRAVENOUS; SUBCUTANEOUS at 22:37

## 2021-10-13 RX ADMIN — HEPARIN SODIUM 5000 UNIT(S): 5000 INJECTION INTRAVENOUS; SUBCUTANEOUS at 06:34

## 2021-10-13 RX ADMIN — HYDROMORPHONE HYDROCHLORIDE 1 MILLIGRAM(S): 2 INJECTION INTRAMUSCULAR; INTRAVENOUS; SUBCUTANEOUS at 23:37

## 2021-10-13 RX ADMIN — Medication 3 MILLIGRAM(S): at 22:41

## 2021-10-13 RX ADMIN — Medication 650 MILLIGRAM(S): at 06:34

## 2021-10-13 RX ADMIN — SIMVASTATIN 40 MILLIGRAM(S): 20 TABLET, FILM COATED ORAL at 21:22

## 2021-10-13 RX ADMIN — POLYETHYLENE GLYCOL 3350 17 GRAM(S): 17 POWDER, FOR SOLUTION ORAL at 19:46

## 2021-10-13 RX ADMIN — Medication 100 MILLIGRAM(S): at 13:10

## 2021-10-13 RX ADMIN — HEPARIN SODIUM 5000 UNIT(S): 5000 INJECTION INTRAVENOUS; SUBCUTANEOUS at 13:10

## 2021-10-13 RX ADMIN — CARVEDILOL PHOSPHATE 12.5 MILLIGRAM(S): 80 CAPSULE, EXTENDED RELEASE ORAL at 17:37

## 2021-10-13 RX ADMIN — Medication 81 MILLIGRAM(S): at 11:57

## 2021-10-13 NOTE — CHART NOTE - NSCHARTNOTEFT_GEN_A_CORE
RN called for pt c/o pain at amputation site. Patient seen at bedside. States pain was 4/10, but is steadily increasing , at times 7/10. Pain is sharp and stabbing in nature, coming in waves approximately every minute.    - Dilaudid 1mg PO x1 ordered  - RN to call with any changes

## 2021-10-13 NOTE — PROGRESS NOTE ADULT - SUBJECTIVE AND OBJECTIVE BOX
Patient is a 75y old  Male who presents with a chief complaint of Left first distal hallux osteomyelitis (12 Oct 2021 15:55)      FROM ADMISSION H+P:   HPI:  HPI:   Patient is a 76 y/o M with pmhx T2DM, HTN, HLD, peripheral neuropathy, who presents with left first distal hallux osteomyelitis as per outpatient MRI 9/22/21. Pt saw podiatry today at [facility] care center. The patient states he has had infected L distal hallux for x1 year, worsening over the past 2-3months. Currently denies feeling any pain, no drainage, no change in gait. After seeing podiatry, derm, and wound care, he felt that after antibiotics and conservation management, infection failed to properly heal and "wants to get it taken care of" before worsens. Patient endorses having had similar lesions/infected toes in past but states they have always completed resolved, unlike this one. Endorses continued neuropathy from feet up to just below the knee bilaterally. Denies feeling pain (likely 2/2 neuropathy), gait or balance problems, fevers, chills, chest pain, SOB, leg swelling.   Denies past hx of CAD/MI/TIA/CVA/CHF. He can ambulate 2 flights of stairs without SOB or chest pain.     IN THE ED:  Temp  98.2 F, HR 78, /80, RR 16, SpO2 97%  S/P ceftriaxone IV 2g, vancomycin IV 1g   EKG: normal sinus rhythm 1st degree block, no acute ischemic changes  Labs significant for WBC 24.36 w/ lymphocytes 14.86, BUN 39, Cr 1.7, Glu 162, eGFR 39  Imaging: negative CXR, L foot MRI on 9/22 - osteomyelitis of L distal phalanx    (11 Oct 2021 11:47)      ----  INTERVAL HPI/OVERNIGHT EVENTS: Pt seen and evaluated at the bedside. No acute overnight events occurred. Patient states he is overall doing well. Pain is well controlled on current regimen. States he is experiencing some constipation. Denies N/V or abdominal pain    ----  PAST MEDICAL & SURGICAL HISTORY:  Diabetes    High cholesterol    Hypertension    History of adenoidectomy        FAMILY HISTORY:  FHx: coronary artery disease (Father, Grandparent)        Home Medications:  aspirin 81 mg oral tablet: 1 tab(s) orally once a day (11 Oct 2021 12:55)  carvedilol 40 mg oral capsule, extended release: 1 cap(s) orally once a day (in the morning) (11 Oct 2021 12:55)  metFORMIN 500 mg oral tablet, extended release: 1 tab(s) orally 2 times a day (11 Oct 2021 12:55)  multivitamin: 1 tab(s) orally once a day (11 Oct 2021 12:55)  Ozempic 2 mg/1.5 mL (0.25 mg or 0.5 mg dose) subcutaneous solution: 0.5 milligram(s) subcutaneous once a week (Mondays) (11 Oct 2021 12:55)  ramipril 5 mg oral capsule: 1 cap(s) orally once a day (11 Oct 2021 12:55)  simvastatin 40 mg oral tablet: 1 tab(s) orally once a day (at bedtime) (11 Oct 2021 12:55)  Vitamin D3 25 mcg (1000 intl units) oral tablet: 1 tab(s) orally once a day (11 Oct 2021 12:55)      Allergies    No Known Allergies    Intolerances        ----  MEDICATIONS  (STANDING):  aspirin  chewable 81 milliGRAM(s) Oral daily  carvedilol 12.5 milliGRAM(s) Oral every 12 hours  ceFAZolin   IVPB 2000 milliGRAM(s) IV Intermittent every 8 hours  dextrose 40% Gel 15 Gram(s) Oral once  dextrose 5%. 1000 milliLiter(s) (100 mL/Hr) IV Continuous <Continuous>  dextrose 5%. 1000 milliLiter(s) (50 mL/Hr) IV Continuous <Continuous>  dextrose 50% Injectable 12.5 Gram(s) IV Push once  dextrose 50% Injectable 25 Gram(s) IV Push once  glucagon  Injectable 1 milliGRAM(s) IntraMuscular once  heparin   Injectable 5000 Unit(s) SubCutaneous every 8 hours  insulin lispro (ADMELOG) corrective regimen sliding scale   SubCutaneous three times a day before meals  melatonin 3 milliGRAM(s) Oral at bedtime  senna 2 Tablet(s) Oral at bedtime  simvastatin 40 milliGRAM(s) Oral at bedtime    MEDICATIONS  (PRN):  acetaminophen   Tablet .. 650 milliGRAM(s) Oral every 6 hours PRN Temp greater or equal to 38C (100.4F), Mild Pain (1 - 3)      ----  REVIEW OF SYSTEMS:  Constitutional: denies fever, chills  HEENT: denies headache, dizziness, or lightheadedness  Respiratory: denies SOB, cough, or wheezing  Cardiovascular: denies CP, palpitations  Gastrointestinal: admits constipation denies nausea, vomiting, diarrhea, abdominal pain, or bloody stools  Genitourinary: denies painful urination, increased frequency, urgency, or bloody urine  Skin/Breast: denies rashes or itching  Musculoskeletal: denies muscle aches, joint swelling, or muscle weakness  Neurologic: denies loss of sensation, numbness, or tingling    ----  PHYSICAL EXAM:  Gen: well appearing, NAD  HEENT: NCAT, PEERLA b/l, EOMI b/l, no conjunctival erythema  Cardio: regular rate and rhythm, +s1s2, no murmurs, rubs, or gallops  Pulm: CTA b/l, no wheezes, rales or rhonchi  Abdomen: soft, nontender, nondistended, +BS x4 quadrants, no guarding  Extremities: left foot dressings c/d/i  Neuro: AAOx3  Skin: warm and dry      T(C): 36.8 (10-13-21 @ 05:58), Max: 36.8 (10-13-21 @ 05:58)  HR: 75 (10-13-21 @ 05:58) (67 - 75)  BP: 125/73 (10-13-21 @ 05:58) (124/63 - 160/90)  RR: 18 (10-13-21 @ 05:58) (15 - 18)  SpO2: 94% (10-13-21 @ 05:58) (94% - 99%)  Wt(kg): --    ----  I&O's Summary    12 Oct 2021 07:01  -  13 Oct 2021 07:00  --------------------------------------------------------  IN: 605 mL / OUT: 0 mL / NET: 605 mL        LABS:                        13.0   22.17 )-----------( 165      ( 13 Oct 2021 09:33 )             40.8     10-13    138  |  107  |  23  ----------------------------<  110<H>  4.8   |  27  |  1.70<H>    Ca    8.4<L>      13 Oct 2021 09:33    TPro  7.1  /  Alb  3.1<L>  /  TBili  0.3  /  DBili  x   /  AST  18  /  ALT  15  /  AlkPhos  51  10-13        CAPILLARY BLOOD GLUCOSE      POCT Blood Glucose.: 139 mg/dL (13 Oct 2021 11:30)  POCT Blood Glucose.: 113 mg/dL (13 Oct 2021 07:42)  POCT Blood Glucose.: 106 mg/dL (12 Oct 2021 22:31)  POCT Blood Glucose.: 127 mg/dL (12 Oct 2021 16:47)  POCT Blood Glucose.: 107 mg/dL (12 Oct 2021 12:30)      10-12 @ 15:03   No growth  --  --  10-11 @ 11:25   No growth to date.  --  --          Culture - Tissue with Gram Stain (collected 10-12-21 @ 15:03)  Source: .Tissue Left distal phalanx bone cultu  Gram Stain (10-12-21 @ 22:59):    No polymorphonuclear cells seen per low power field    No organisms seen per oil power field  Preliminary Report (10-13-21 @ 11:30):    No growth        ----           Patient is a 75y old  Male who presents with a chief complaint of Left first distal hallux osteomyelitis (12 Oct 2021 15:55)      FROM ADMISSION H+P:   Patient is a 76 y/o M with pmhx T2DM, HTN, HLD, peripheral neuropathy, who presents with left first distal hallux osteomyelitis as per outpatient MRI 9/22/21. Pt saw podiatry today at [facility] center. The patient states he has had infected L distal hallux for x1 year, worsening over the past 2-3months. Currently denies feeling any pain, no drainage, no change in gait. After seeing podiatry, derm, and wound care, he felt that after antibiotics and conservation management, infection failed to properly heal and "wants to get it taken care of" before worsens. Patient endorses having had similar lesions/infected toes in past but states they have always completed resolved, unlike this one. Endorses continued neuropathy from feet up to just below the knee bilaterally. Denies feeling pain (likely 2/2 neuropathy), gait or balance problems, fevers, chills, chest pain, SOB, leg swelling.   Denies past hx of CAD/MI/TIA/CVA/CHF. He can ambulate 2 flights of stairs without SOB or chest pain.     IN THE ED:  Temp  98.2 F, HR 78, /80, RR 16, SpO2 97%  S/P ceftriaxone IV 2g, vancomycin IV 1g   EKG: normal sinus rhythm 1st degree block, no acute ischemic changes  Labs significant for WBC 24.36 w/ lymphocytes 14.86, BUN 39, Cr 1.7, Glu 162, eGFR 39  Imaging: negative CXR, L foot MRI on 9/22 - osteomyelitis of L distal phalanx    (11 Oct 2021 11:47)      ----  INTERVAL HPI/OVERNIGHT EVENTS: Pt seen and evaluated at the bedside. No acute overnight events occurred. Patient states he is overall doing well. Pain is well controlled on current regimen. States he is experiencing some constipation. Denies N/V or abdominal pain    ----  PAST MEDICAL & SURGICAL HISTORY:  Diabetes    High cholesterol    Hypertension    History of adenoidectomy        FAMILY HISTORY:  FHx: coronary artery disease (Father, Grandparent)        Home Medications:  aspirin 81 mg oral tablet: 1 tab(s) orally once a day (11 Oct 2021 12:55)  carvedilol 40 mg oral capsule, extended release: 1 cap(s) orally once a day (in the morning) (11 Oct 2021 12:55)  metFORMIN 500 mg oral tablet, extended release: 1 tab(s) orally 2 times a day (11 Oct 2021 12:55)  multivitamin: 1 tab(s) orally once a day (11 Oct 2021 12:55)  Ozempic 2 mg/1.5 mL (0.25 mg or 0.5 mg dose) subcutaneous solution: 0.5 milligram(s) subcutaneous once a week (Mondays) (11 Oct 2021 12:55)  ramipril 5 mg oral capsule: 1 cap(s) orally once a day (11 Oct 2021 12:55)  simvastatin 40 mg oral tablet: 1 tab(s) orally once a day (at bedtime) (11 Oct 2021 12:55)  Vitamin D3 25 mcg (1000 intl units) oral tablet: 1 tab(s) orally once a day (11 Oct 2021 12:55)      Allergies    No Known Allergies    Intolerances        ----  MEDICATIONS  (STANDING):  aspirin  chewable 81 milliGRAM(s) Oral daily  carvedilol 12.5 milliGRAM(s) Oral every 12 hours  ceFAZolin   IVPB 2000 milliGRAM(s) IV Intermittent every 8 hours  dextrose 40% Gel 15 Gram(s) Oral once  dextrose 5%. 1000 milliLiter(s) (100 mL/Hr) IV Continuous <Continuous>  dextrose 5%. 1000 milliLiter(s) (50 mL/Hr) IV Continuous <Continuous>  dextrose 50% Injectable 12.5 Gram(s) IV Push once  dextrose 50% Injectable 25 Gram(s) IV Push once  glucagon  Injectable 1 milliGRAM(s) IntraMuscular once  heparin   Injectable 5000 Unit(s) SubCutaneous every 8 hours  insulin lispro (ADMELOG) corrective regimen sliding scale   SubCutaneous three times a day before meals  melatonin 3 milliGRAM(s) Oral at bedtime  senna 2 Tablet(s) Oral at bedtime  simvastatin 40 milliGRAM(s) Oral at bedtime    MEDICATIONS  (PRN):  acetaminophen   Tablet .. 650 milliGRAM(s) Oral every 6 hours PRN Temp greater or equal to 38C (100.4F), Mild Pain (1 - 3)      ----  REVIEW OF SYSTEMS:  Constitutional: denies fever, chills  HEENT: denies headache, dizziness, or lightheadedness  Respiratory: denies SOB, cough, or wheezing  Cardiovascular: denies CP, palpitations  Gastrointestinal: admits constipation denies nausea, vomiting, diarrhea, abdominal pain, or bloody stools  Genitourinary: denies painful urination, increased frequency, urgency, or bloody urine  Skin/Breast: denies rashes or itching  Musculoskeletal: denies muscle aches, joint swelling, or muscle weakness  Neurologic: denies loss of sensation, numbness, or tingling    ----  PHYSICAL EXAM:  Gen: well appearing, NAD  HEENT: NCAT, PEERLA b/l, EOMI b/l, no conjunctival erythema  Cardio: regular rate and rhythm, +s1s2, no murmurs, rubs, or gallops  Pulm: CTA b/l, no wheezes, rales or rhonchi  Abdomen: soft, nontender, nondistended, +BS x4 quadrants, no guarding  Extremities: left foot dressings c/d/i  Neuro: AAOx3  Skin: warm and dry      T(C): 36.8 (10-13-21 @ 05:58), Max: 36.8 (10-13-21 @ 05:58)  HR: 75 (10-13-21 @ 05:58) (67 - 75)  BP: 125/73 (10-13-21 @ 05:58) (124/63 - 160/90)  RR: 18 (10-13-21 @ 05:58) (15 - 18)  SpO2: 94% (10-13-21 @ 05:58) (94% - 99%)  Wt(kg): --    ----  I&O's Summary    12 Oct 2021 07:01  -  13 Oct 2021 07:00  --------------------------------------------------------  IN: 605 mL / OUT: 0 mL / NET: 605 mL        LABS:                        13.0   22.17 )-----------( 165      ( 13 Oct 2021 09:33 )             40.8     10-13    138  |  107  |  23  ----------------------------<  110<H>  4.8   |  27  |  1.70<H>    Ca    8.4<L>      13 Oct 2021 09:33    TPro  7.1  /  Alb  3.1<L>  /  TBili  0.3  /  DBili  x   /  AST  18  /  ALT  15  /  AlkPhos  51  10-13        CAPILLARY BLOOD GLUCOSE      POCT Blood Glucose.: 139 mg/dL (13 Oct 2021 11:30)  POCT Blood Glucose.: 113 mg/dL (13 Oct 2021 07:42)  POCT Blood Glucose.: 106 mg/dL (12 Oct 2021 22:31)  POCT Blood Glucose.: 127 mg/dL (12 Oct 2021 16:47)  POCT Blood Glucose.: 107 mg/dL (12 Oct 2021 12:30)      10-12 @ 15:03   No growth  --  --  10-11 @ 11:25   No growth to date.  --  --          Culture - Tissue with Gram Stain (collected 10-12-21 @ 15:03)  Source: .Tissue Left distal phalanx bone cultu  Gram Stain (10-12-21 @ 22:59):    No polymorphonuclear cells seen per low power field    No organisms seen per oil power field  Preliminary Report (10-13-21 @ 11:30):    No growth        ----

## 2021-10-13 NOTE — PROGRESS NOTE ADULT - SUBJECTIVE AND OBJECTIVE BOX
DEPARTMENT OF ANESTHESIA  POST ANESTHETIC EVALUATION    The Patient was interviewed and evaluated    Vital Signs Last 24 Hrs  T(C): 36.8 (13 Oct 2021 05:58), Max: 36.8 (13 Oct 2021 05:58)  T(F): 98.3 (13 Oct 2021 05:58), Max: 98.3 (13 Oct 2021 05:58)  HR: 75 (13 Oct 2021 05:58) (67 - 75)  BP: 125/73 (13 Oct 2021 05:58) (125/73 - 160/90)  BP(mean): --  RR: 18 (13 Oct 2021 05:58) (16 - 18)  SpO2: 94% (13 Oct 2021 05:58) (94% - 97%)    Evaluation:      (x ) No apparent complications or complaints regarding anesthesia care at this time  (x ) All questions were answered    Condition:  (x ) Stable      ( ) Guarded      ( ) Critical    Recommendations:  (x ) None     ( ) Other:

## 2021-10-13 NOTE — PHYSICAL THERAPY INITIAL EVALUATION ADULT - ADDITIONAL COMMENTS
Patient lives alone in private home +3 NBA then stairs to second floor.  Patient was independent in all ADLs and ambulated independently without device.  Patient has old cane which he used at times.  Patient has tub shower.

## 2021-10-13 NOTE — PROGRESS NOTE ADULT - ATTENDING COMMENTS
74 y/o M with pmhx T2DM, HTN, HLD, peripheral neuropathy, who presents with left first distal hallux osteomyelitis as per outpatient MRI 9/22/21. The patient states he has had infected L distal hallux for x1 year, worsening over the past 2-3months. Currently denies feeling any pain, no drainage, no change in gait. Admitted for podiatry workup and surgical intervention. Medically optimized for planned procedure with routine hemodynamic monitoring. Continue abx per ID reccs. Follow surgical path and blood cultures post op
Patient evaluated at the bedside. L PT is palpable. DP biphasic signal. Signal at the base of hallux. No further intervention is needed. OK to have podiatric surgery
Patient is a 74 y/o M with pmhx T2DM, HTN, HLD, peripheral neuropathy, who presents with left first distal hallux osteomyelitis as per outpatient MRI 9/22/21. The patient states he has had infected L distal hallux for x1 year, worsening over the past 2-3months.    Patient seen and examined. States he feels well, pain well controlled. F/U final path results. Monitor Cr levels closely.

## 2021-10-13 NOTE — PROGRESS NOTE ADULT - ASSESSMENT
Patient is a 74 y/o M with pmhx T2DM, HTN, HLD, peripheral neuropathy, who presents with left first distal hallux osteomyelitis as per outpatient MRI 9/22/21. The patient states he has had infected L distal hallux for x1 year, worsening over the past 2-3months. Currently denies feeling any pain, no drainage, no change in gait.    #Osteomyelitis of great toe of left foot  - s/p left partial hallux amputation POD#1  - MRI L foot on 9/22: soft tissue ulcer along the distal aspect of the first toe with findings concerning for osteomyelitis of the underlying first distal phalanx. Small second webspace interdigital neuroma measuring 4 x 3 mm. Mild third webspace intermetatarsal bursitis.  - BCx2 NGTD  - pain control as ordered - will start senna for constipation in setting of opiod use  - f/u OR culture and bone path  - continue ancef 2gm q8hrs  - ID consulted, Dr Foster    #T2DM  - hold home Metformin, Ozempic (last dose Ozempic 10/10/21 pm)  - continue low dose insulin corrective scale  - f/u A1c- 6.8 (10/12)  - hypoglycemia protocol in place    #CECILIA on CKD  - this AM BUN 29, Cr 1.5, unknown baseline though Cr 1.46, 1.27 in 2017  - likely prerenal azotemia on diabetic nephropathy  - hold ACE-i  -continue to trend renal indices  -Nephro - Dr. Moser following    #High cholesterol   - continue simvastatin 40mg daily.    #Hypertension  - continue carvedilol 12.5mg q12h daily  - hold home ramipril due to CECILIA    #DVT prophylaxis  - Heparin 5000 U subq q8 hours.   Patient is a 76 y/o M with pmhx T2DM, HTN, HLD, peripheral neuropathy, who presents with left first distal hallux osteomyelitis as per outpatient MRI 9/22/21. The patient states he has had infected L distal hallux for x1 year, worsening over the past 2-3months. Currently denies feeling any pain, no drainage, no change in gait.    #Osteomyelitis of great toe of left foot  - s/p left partial hallux amputation POD#1  - MRI L foot on 9/22: soft tissue ulcer along the distal aspect of the first toe with findings concerning for osteomyelitis of the underlying first distal phalanx. Small second webspace interdigital neuroma measuring 4 x 3 mm. Mild third webspace intermetatarsal bursitis.  - BCx2 NGTD  - pain control as ordered - will start senna for constipation in setting of opiod use  - f/u OR culture and bone path  - continue ancef 2gm q8hrs  - ID consulted, Dr Foster    #T2DM  - hold home Metformin, Ozempic (last dose Ozempic 10/10/21 pm)  - continue low dose insulin corrective scale  - f/u A1c- 6.8 (10/12)  - hypoglycemia protocol in place    #CECILIA on CKD  - this AM Cr 1.7, unknown baseline though Cr 1.46, 1.27 in 2017  - likely prerenal azotemia on diabetic nephropathy  - hold ACE-i  -continue to trend renal indices  -Nephro - Dr. Moser following    #High cholesterol   - continue simvastatin 40mg daily.    #Hypertension  - continue carvedilol 12.5mg q12h daily  - hold home ramipril due to CECILIA    #DVT prophylaxis  - Heparin 5000 U subq q8 hours.

## 2021-10-13 NOTE — PROGRESS NOTE ADULT - ASSESSMENT
Prerenal azotemia, CKD 3: Likely diabetic nephropathy  Left hallux OM, s/p Partial amputation of great toe 12-Oct-2021    Diabetes  Hypertension    Stable renal function. s/p surgery. To continue current meds. IV abx. Monitor blood sugar levels. Insulin coverage as needed. Dietary restriction. Monitor BP trend. Titrate BP meds as needed. Salt restriction. Will follow electrolytes and renal function trend. D/w patient regarding need for out patient nephrology follow up.

## 2021-10-13 NOTE — PROGRESS NOTE ADULT - SUBJECTIVE AND OBJECTIVE BOX
Patient is a 75y old  Male who presents with a chief complaint of Left first distal hallux osteomyelitis (13 Oct 2021 12:16)    Patient seen in follow up for CKD 3.        PAST MEDICAL HISTORY:  Diabetes    High cholesterol    Hypertension      MEDICATIONS  (STANDING):  aspirin  chewable 81 milliGRAM(s) Oral daily  carvedilol 12.5 milliGRAM(s) Oral every 12 hours  ceFAZolin   IVPB 2000 milliGRAM(s) IV Intermittent every 8 hours  dextrose 40% Gel 15 Gram(s) Oral once  dextrose 5%. 1000 milliLiter(s) (100 mL/Hr) IV Continuous <Continuous>  dextrose 5%. 1000 milliLiter(s) (50 mL/Hr) IV Continuous <Continuous>  dextrose 50% Injectable 12.5 Gram(s) IV Push once  dextrose 50% Injectable 25 Gram(s) IV Push once  glucagon  Injectable 1 milliGRAM(s) IntraMuscular once  heparin   Injectable 5000 Unit(s) SubCutaneous every 8 hours  insulin lispro (ADMELOG) corrective regimen sliding scale   SubCutaneous three times a day before meals  melatonin 3 milliGRAM(s) Oral at bedtime  senna 2 Tablet(s) Oral at bedtime  simvastatin 40 milliGRAM(s) Oral at bedtime    MEDICATIONS  (PRN):  acetaminophen   Tablet .. 650 milliGRAM(s) Oral every 6 hours PRN Temp greater or equal to 38C (100.4F), Mild Pain (1 - 3)    T(C): 36.8 (10-13-21 @ 13:39), Max: 36.8 (10-13-21 @ 05:58)  HR: 78 (10-13-21 @ 13:39) (67 - 78)  BP: 132/80 (10-13-21 @ 13:39) (107/54 - 160/90)  RR: 18 (10-13-21 @ 13:39) (13 - 18)  SpO2: 96% (10-13-21 @ 13:39) (93% - 99%)  Wt(kg): --  I&O's Detail    12 Oct 2021 07:01  -  13 Oct 2021 07:00  --------------------------------------------------------  IN:    IV PiggyBack: 50 mL    Lactated Ringers: 75 mL    Oral Fluid: 480 mL  Total IN: 605 mL    OUT:  Total OUT: 0 mL    Total NET: 605 mL          PHYSICAL EXAM:  General: No distress  Respiratory: b/l air entry  Cardiovascular: S1 S2  Gastrointestinal: soft  Extremities:  no edema, foot dressed                              13.0   22.17 )-----------( 165      ( 13 Oct 2021 09:33 )             40.8     10-13    138  |  107  |  23  ----------------------------<  110<H>  4.8   |  27  |  1.70<H>    Ca    8.4<L>      13 Oct 2021 09:33    TPro  7.1  /  Alb  3.1<L>  /  TBili  0.3  /  DBili  x   /  AST  18  /  ALT  15  /  AlkPhos  51  10-13        LIVER FUNCTIONS - ( 13 Oct 2021 09:33 )  Alb: 3.1 g/dL / Pro: 7.1 g/dL / ALK PHOS: 51 U/L / ALT: 15 U/L / AST: 18 U/L / GGT: x               Sodium, Serum: 138 (10-13 @ 09:33)  Sodium, Serum: 138 (10-12 @ 09:17)  Sodium, Serum: 138 (10-11 @ 09:32)    Creatinine, Serum: 1.70 (10-13 @ 09:33)  Creatinine, Serum: 1.50 (10-12 @ 09:17)  Creatinine, Serum: 1.70 (10-11 @ 09:32)    Potassium, Serum: 4.8 (10-13 @ 09:33)  Potassium, Serum: 4.4 (10-12 @ 09:17)  Potassium, Serum: 4.9 (10-11 @ 09:32)    Hemoglobin: 13.0 (10-13 @ 09:33)  Hemoglobin: 12.5 (10-12 @ 09:17)  Hemoglobin: 13.8 (10-11 @ 09:32)

## 2021-10-13 NOTE — PHYSICAL THERAPY INITIAL EVALUATION ADULT - PERTINENT HX OF CURRENT PROBLEM, REHAB EVAL
Patient is a 76 y/o M with pmhx T2DM, HTN, HLD, peripheral neuropathy, who presents with left first distal hallux osteomyelitis as per outpatient MRI 9/22/21. Pt saw podiatry today at [facility].

## 2021-10-13 NOTE — PROGRESS NOTE ADULT - PROBLEM SELECTOR PLAN 2
Pt seen and evaluated.  Removed dressing, cleansed area with normal saline and applied xeroform with DSD and light ACE wrap.  f/u OR cultures and pathology and final ID recs with results  Elevated WBC after surgery- c/w IV abx.  Pt will be followed by Podiatry while in house.    WOUND CARE INSTRUCTIONS  1. Keep dressing clean, dry and intact until follow up.  2. Can be partial weight bearing to the heel in a surgical shoe.  3. Monitor for any signs of infection.  4. Patient is to follow up in [facility] with Dr. Lugo or Dr. Hampton within 3-5 days upon discharge. Please call as soon as discharged to make a follow up appt and let them know that it is specifically for a "post-op check".

## 2021-10-13 NOTE — PROGRESS NOTE ADULT - SUBJECTIVE AND OBJECTIVE BOX
HPI:  75 year old Male seen bedside s/p left partial hallux amputation (DOS: 10/14/21) with Dr. Lugo. Pt states that he is in no pain at this time, he was given 2 Tylenols this morning. Pt has urinated but has not passed a bowel movement, which the medicine team is aware about and he states that he will be getting medication for. Pt has eaten since surgery. Denies any fever, chills, nausea, vomiting, chest pain, shortness of breath, or calf pain at this time.      PAST MEDICAL & SURGICAL HISTORY:  Diabetes    High cholesterol    Hypertension    History of adenoidectomy        Allergies    No Known Allergies    Intolerances    MEDICATIONS  (STANDING):  aspirin  chewable 81 milliGRAM(s) Oral daily  carvedilol 12.5 milliGRAM(s) Oral every 12 hours  ceFAZolin   IVPB 2000 milliGRAM(s) IV Intermittent every 8 hours  dextrose 40% Gel 15 Gram(s) Oral once  dextrose 5%. 1000 milliLiter(s) (100 mL/Hr) IV Continuous <Continuous>  dextrose 5%. 1000 milliLiter(s) (50 mL/Hr) IV Continuous <Continuous>  dextrose 50% Injectable 12.5 Gram(s) IV Push once  dextrose 50% Injectable 25 Gram(s) IV Push once  glucagon  Injectable 1 milliGRAM(s) IntraMuscular once  heparin   Injectable 5000 Unit(s) SubCutaneous every 8 hours  insulin lispro (ADMELOG) corrective regimen sliding scale   SubCutaneous three times a day before meals  melatonin 3 milliGRAM(s) Oral at bedtime  senna 2 Tablet(s) Oral at bedtime  simvastatin 40 milliGRAM(s) Oral at bedtime    MEDICATIONS  (PRN):  acetaminophen   Tablet .. 650 milliGRAM(s) Oral every 6 hours PRN Temp greater or equal to 38C (100.4F), Mild Pain (1 - 3)      Social History: Former smoker      FAMILY HISTORY: No significant history.      Vital Signs Last 24 Hrs  T(C): 36.8 (13 Oct 2021 13:39), Max: 36.8 (13 Oct 2021 05:58)  T(F): 98.3 (13 Oct 2021 13:39), Max: 98.3 (13 Oct 2021 05:58)  HR: 78 (13 Oct 2021 13:39) (69 - 78)  BP: 132/80 (13 Oct 2021 13:39) (125/73 - 160/90)  BP(mean): --  RR: 18 (13 Oct 2021 13:39) (17 - 18)  SpO2: 96% (13 Oct 2021 13:39) (94% - 97%)    PHYSICAL EXAM:  Vascular: DP & PT palpable bilaterally, Capillary refill 3 seconds, Digital hair present bilaterally, minimal left foot edema, no varicosities.  Neurological: Light touch sensation intact bilaterally  Musculoskeletal: 5/5 strength in all quadrants bilaterally, AJ & STJ ROM intact  Dermatological: s/p left partial hallux amputation with sutures intact. No active drainage. No signs of clinical infection.    CBC Full  -  ( 13 Oct 2021 09:33 )  WBC Count : 22.17 K/uL  RBC Count : 4.58 M/uL  Hemoglobin : 13.0 g/dL  Hematocrit : 40.8 %  Platelet Count - Automated : 165 K/uL  Mean Cell Volume : 89.1 fl  Mean Cell Hemoglobin : 28.4 pg  Mean Cell Hemoglobin Concentration : 31.9 gm/dL  Auto Neutrophil # : 4.83 K/uL  Auto Lymphocyte # : 15.83 K/uL  Auto Monocyte # : 1.03 K/uL  Auto Eosinophil # : 0.33 K/uL  Auto Basophil # : 0.09 K/uL  Auto Neutrophil % : 21.8 %  Auto Lymphocyte % : 71.4 %  Auto Monocyte % : 4.6 %  Auto Eosinophil % : 1.5 %  Auto Basophil % : 0.4 %      10-13    138  |  107  |  23  ----------------------------<  110<H>  4.8   |  27  |  1.70<H>    Ca    8.4<L>      13 Oct 2021 09:33    TPro  7.1  /  Alb  3.1<L>  /  TBili  0.3  /  DBili  x   /  AST  18  /  ALT  15  /  AlkPhos  51  10-13

## 2021-10-14 LAB
ANION GAP SERPL CALC-SCNC: 8 MMOL/L — SIGNIFICANT CHANGE UP (ref 5–17)
BASOPHILS # BLD AUTO: 0.07 K/UL — SIGNIFICANT CHANGE UP (ref 0–0.2)
BASOPHILS NFR BLD AUTO: 0.3 % — SIGNIFICANT CHANGE UP (ref 0–2)
BUN SERPL-MCNC: 24 MG/DL — HIGH (ref 7–23)
CALCIUM SERPL-MCNC: 8.4 MG/DL — LOW (ref 8.5–10.1)
CHLORIDE SERPL-SCNC: 107 MMOL/L — SIGNIFICANT CHANGE UP (ref 96–108)
CO2 SERPL-SCNC: 23 MMOL/L — SIGNIFICANT CHANGE UP (ref 22–31)
CREAT SERPL-MCNC: 1.4 MG/DL — HIGH (ref 0.5–1.3)
EOSINOPHIL # BLD AUTO: 0.28 K/UL — SIGNIFICANT CHANGE UP (ref 0–0.5)
EOSINOPHIL NFR BLD AUTO: 1.3 % — SIGNIFICANT CHANGE UP (ref 0–6)
GLUCOSE SERPL-MCNC: 136 MG/DL — HIGH (ref 70–99)
HCT VFR BLD CALC: 40.4 % — SIGNIFICANT CHANGE UP (ref 39–50)
HGB BLD-MCNC: 12.9 G/DL — LOW (ref 13–17)
IMM GRANULOCYTES NFR BLD AUTO: 0.3 % — SIGNIFICANT CHANGE UP (ref 0–1.5)
LYMPHOCYTES # BLD AUTO: 16.31 K/UL — HIGH (ref 1–3.3)
LYMPHOCYTES # BLD AUTO: 76.3 % — HIGH (ref 13–44)
MCHC RBC-ENTMCNC: 28 PG — SIGNIFICANT CHANGE UP (ref 27–34)
MCHC RBC-ENTMCNC: 31.9 GM/DL — LOW (ref 32–36)
MCV RBC AUTO: 87.6 FL — SIGNIFICANT CHANGE UP (ref 80–100)
MONOCYTES # BLD AUTO: 1.03 K/UL — HIGH (ref 0–0.9)
MONOCYTES NFR BLD AUTO: 4.8 % — SIGNIFICANT CHANGE UP (ref 2–14)
NEUTROPHILS # BLD AUTO: 3.64 K/UL — SIGNIFICANT CHANGE UP (ref 1.8–7.4)
NEUTROPHILS NFR BLD AUTO: 17 % — LOW (ref 43–77)
NRBC # BLD: 0 /100 WBCS — SIGNIFICANT CHANGE UP (ref 0–0)
PLATELET # BLD AUTO: 162 K/UL — SIGNIFICANT CHANGE UP (ref 150–400)
POTASSIUM SERPL-MCNC: 4.1 MMOL/L — SIGNIFICANT CHANGE UP (ref 3.5–5.3)
POTASSIUM SERPL-SCNC: 4.1 MMOL/L — SIGNIFICANT CHANGE UP (ref 3.5–5.3)
RBC # BLD: 4.61 M/UL — SIGNIFICANT CHANGE UP (ref 4.2–5.8)
RBC # FLD: 14.6 % — HIGH (ref 10.3–14.5)
SODIUM SERPL-SCNC: 138 MMOL/L — SIGNIFICANT CHANGE UP (ref 135–145)
SURGICAL PATHOLOGY STUDY: SIGNIFICANT CHANGE UP
WBC # BLD: 21.39 K/UL — HIGH (ref 3.8–10.5)
WBC # FLD AUTO: 21.39 K/UL — HIGH (ref 3.8–10.5)

## 2021-10-14 PROCEDURE — 93925 LOWER EXTREMITY STUDY: CPT | Mod: 26

## 2021-10-14 PROCEDURE — 99232 SBSQ HOSP IP/OBS MODERATE 35: CPT

## 2021-10-14 PROCEDURE — 93971 EXTREMITY STUDY: CPT | Mod: 26,LT

## 2021-10-14 RX ADMIN — Medication 100 MILLIGRAM(S): at 15:55

## 2021-10-14 RX ADMIN — SENNA PLUS 2 TABLET(S): 8.6 TABLET ORAL at 22:08

## 2021-10-14 RX ADMIN — HEPARIN SODIUM 5000 UNIT(S): 5000 INJECTION INTRAVENOUS; SUBCUTANEOUS at 15:29

## 2021-10-14 RX ADMIN — HEPARIN SODIUM 5000 UNIT(S): 5000 INJECTION INTRAVENOUS; SUBCUTANEOUS at 06:14

## 2021-10-14 RX ADMIN — Medication 100 MILLIGRAM(S): at 06:13

## 2021-10-14 RX ADMIN — SIMVASTATIN 40 MILLIGRAM(S): 20 TABLET, FILM COATED ORAL at 22:08

## 2021-10-14 RX ADMIN — HEPARIN SODIUM 5000 UNIT(S): 5000 INJECTION INTRAVENOUS; SUBCUTANEOUS at 22:08

## 2021-10-14 RX ADMIN — CARVEDILOL PHOSPHATE 12.5 MILLIGRAM(S): 80 CAPSULE, EXTENDED RELEASE ORAL at 06:13

## 2021-10-14 RX ADMIN — Medication 3 MILLIGRAM(S): at 22:08

## 2021-10-14 RX ADMIN — Medication 81 MILLIGRAM(S): at 11:16

## 2021-10-14 RX ADMIN — Medication 100 MILLIGRAM(S): at 22:08

## 2021-10-14 RX ADMIN — CARVEDILOL PHOSPHATE 12.5 MILLIGRAM(S): 80 CAPSULE, EXTENDED RELEASE ORAL at 17:10

## 2021-10-14 NOTE — PROGRESS NOTE ADULT - TIME BILLING
plan of care discussed with patient, RN, interdisciplinary team on 1E
The total amount of time listed was spent reviewing the hospital notes, laboratory values, imaging findings, assessing/counseling the patient, discussing with consultant physicians, case management, social work, and nursing staff.

## 2021-10-14 NOTE — PROGRESS NOTE ADULT - ASSESSMENT
Patient is a 76 y/o M with pmhx T2DM, HTN, HLD, peripheral neuropathy, who presents with left first distal hallux osteomyelitis as per outpatient MRI 9/22/21. The patient states he has had infected L distal hallux for x1 year, worsening over the past 2-3months. Currently denies feeling any pain, no drainage, no change in gait.    #Osteomyelitis of great toe of left foot  - s/p left partial hallux amputation POD#2  - MRI L foot on 9/22: soft tissue ulcer along the distal aspect of the first toe with findings concerning for osteomyelitis of the underlying first distal phalanx. Small second webspace interdigital neuroma measuring 4 x 3 mm. Mild third webspace intermetatarsal bursitis.  - BCx2 NGTD  - pain control as ordered - will start senna for constipation in setting of opiod use  - f/u OR culture and bone path  - continue ancef 2gm q8hrs  - ID consulted, Dr Foster    #T2DM  - hold home Metformin, Ozempic (last dose Ozempic 10/10/21 pm)  - continue low dose insulin corrective scale  - f/u A1c- 6.8 (10/12)  - hypoglycemia protocol in place    #CECILIA on CKD  - this AM Cr 1.4, unknown baseline though Cr 1.46, 1.27 in 2017  - likely prerenal azotemia on diabetic nephropathy  - hold ACE-i  -continue to trend renal indices  -Nephro - Dr. Moser following    #High cholesterol   - continue simvastatin 40mg daily.    #Hypertension  - continue carvedilol 12.5mg q12h daily  - hold home ramipril due to CECILIA    #DVT prophylaxis  - Heparin 5000 U subq q8 hours.

## 2021-10-14 NOTE — PROGRESS NOTE ADULT - SUBJECTIVE AND OBJECTIVE BOX
Patient is a 75y old  Male who presents with a chief complaint of Left first distal hallux osteomyelitis (13 Oct 2021 12:16)    Patient seen in follow up for CKD 3.        PAST MEDICAL HISTORY:  Diabetes    High cholesterol    Hypertension       MEDICATIONS  (STANDING):  aspirin  chewable 81 milliGRAM(s) Oral daily  carvedilol 12.5 milliGRAM(s) Oral every 12 hours  ceFAZolin   IVPB 2000 milliGRAM(s) IV Intermittent every 8 hours  dextrose 40% Gel 15 Gram(s) Oral once  dextrose 5%. 1000 milliLiter(s) (100 mL/Hr) IV Continuous <Continuous>  dextrose 5%. 1000 milliLiter(s) (50 mL/Hr) IV Continuous <Continuous>  dextrose 50% Injectable 12.5 Gram(s) IV Push once  dextrose 50% Injectable 25 Gram(s) IV Push once  glucagon  Injectable 1 milliGRAM(s) IntraMuscular once  heparin   Injectable 5000 Unit(s) SubCutaneous every 8 hours  insulin lispro (ADMELOG) corrective regimen sliding scale   SubCutaneous three times a day before meals  melatonin 3 milliGRAM(s) Oral at bedtime  senna 2 Tablet(s) Oral at bedtime  simvastatin 40 milliGRAM(s) Oral at bedtime    MEDICATIONS  (PRN):  acetaminophen   Tablet .. 650 milliGRAM(s) Oral every 6 hours PRN Temp greater or equal to 38C (100.4F), Mild Pain (1 - 3)  senna 1 Tablet(s) Oral daily PRN Constipation    T(C): 36.6 (10-14-21 @ 14:11), Max: 37 (10-13-21 @ 21:20)  HR: 76 (10-14-21 @ 14:11) (69 - 80)  BP: 107/69 (10-14-21 @ 14:11) (107/69 - 160/90)  RR: 16 (10-14-21 @ 14:11)  SpO2: 97% (10-14-21 @ 14:11)  Wt(kg): --  I&O's Detail          PHYSICAL EXAM:  General: No distress  Respiratory: b/l air entry  Cardiovascular: S1 S2  Gastrointestinal: soft  Extremities:  no edema, left foot dressed                   LABORATORY:                        12.9   21.39 )-----------( 162      ( 14 Oct 2021 09:06 )             40.4     10-14    138  |  107  |  24<H>  ----------------------------<  136<H>  4.1   |  23  |  1.40<H>    Ca    8.4<L>      14 Oct 2021 09:06    TPro  7.1  /  Alb  3.1<L>  /  TBili  0.3  /  DBili  x   /  AST  18  /  ALT  15  /  AlkPhos  51  10-13    Sodium, Serum: 138 mmol/L (10-14 @ 09:06)  Sodium, Serum: 138 mmol/L (10-13 @ 09:33)    Potassium, Serum: 4.1 mmol/L (10-14 @ 09:06)  Potassium, Serum: 4.8 mmol/L (10-13 @ 09:33)    Hemoglobin: 12.9 g/dL (10-14 @ 09:06)  Hemoglobin: 13.0 g/dL (10-13 @ 09:33)  Hemoglobin: 12.5 g/dL (10-12 @ 09:17)    Creatinine, Serum 1.40 (10-14 @ 09:06)  Creatinine, Serum 1.70 (10-13 @ 09:33)  Creatinine, Serum 1.50 (10-12 @ 09:17)        LIVER FUNCTIONS - ( 13 Oct 2021 09:33 )  Alb: 3.1 g/dL / Pro: 7.1 g/dL / ALK PHOS: 51 U/L / ALT: 15 U/L / AST: 18 U/L / GGT: x

## 2021-10-14 NOTE — PROGRESS NOTE ADULT - PROBLEM SELECTOR PLAN 2
Pt seen and evaluated.  Removed dressing, cleansed area with normal saline and applied xeroform with DSD and light ACE wrap.  f/u OR cultures and pathology and final ID recs with results  Elevated WBC- c/w IV abx, f/u ID recs.  Pt will be followed by Podiatry while in house.    WOUND CARE INSTRUCTIONS  1. Keep dressing clean, dry and intact until follow up.  2. Can be partial weight bearing to the heel in a surgical shoe.  3. Monitor for any signs of infection.  4. Patient is to follow up [facility] with Dr. Lugo or Dr. Hampton within 3-5 days upon discharge. Please call as soon as discharged to make a follow up appt and let them know that it is specifically for a "post-op check". Pt seen and evaluated.  Removed dressing, cleansed area with normal saline and applied xeroform with DSD and light ACE wrap.  OR bone culture negative, OR distal phalanx bone path + for chronic OM, clean margin negative for OM.  Pt continues to have elevated WBC, will f/u. c/w IV abx, f/u ID recs.  Pt will be followed by Podiatry while in house.    WOUND CARE INSTRUCTIONS  1. Keep dressing clean, dry and intact until follow up.  2. Can be partial weight bearing to the heel in a surgical shoe.  3. Monitor for any signs of infection.  4. Patient is to follow up in [facility] with Dr. Lugo or Dr. Hampton within 3-5 days upon discharge. Please call as soon as discharged to make a follow up appt and let them know that it is specifically for a "post-op check".

## 2021-10-14 NOTE — PROGRESS NOTE ADULT - TIME-BASED BILLING (NON-CRITICAL CARE)
Time-based billing (NON-critical care)
Time-based billing (NON-critical care)
I have personally performed a face to face diagnostic evaluation on this patient. I have reviewed the ACP note and agree with the history, exam and plan of care, except as noted.

## 2021-10-14 NOTE — PROGRESS NOTE ADULT - SUBJECTIVE AND OBJECTIVE BOX
HPI:  75 year old Male seen bedside s/p left partial hallux amputation (DOS: 10/14/21) with Dr. Lugo. Pt states that he is in no pain at this time. Denies any fever, chills, nausea, vomiting, chest pain, shortness of breath, or calf pain at this time.      PAST MEDICAL & SURGICAL HISTORY:  Diabetes    High cholesterol    Hypertension    History of adenoidectomy        Allergies    No Known Allergies    Intolerances    MEDICATIONS  (STANDING):  aspirin  chewable 81 milliGRAM(s) Oral daily  carvedilol 12.5 milliGRAM(s) Oral every 12 hours  ceFAZolin   IVPB 2000 milliGRAM(s) IV Intermittent every 8 hours  dextrose 40% Gel 15 Gram(s) Oral once  dextrose 5%. 1000 milliLiter(s) (100 mL/Hr) IV Continuous <Continuous>  dextrose 5%. 1000 milliLiter(s) (50 mL/Hr) IV Continuous <Continuous>  dextrose 50% Injectable 12.5 Gram(s) IV Push once  dextrose 50% Injectable 25 Gram(s) IV Push once  glucagon  Injectable 1 milliGRAM(s) IntraMuscular once  heparin   Injectable 5000 Unit(s) SubCutaneous every 8 hours  insulin lispro (ADMELOG) corrective regimen sliding scale   SubCutaneous three times a day before meals  melatonin 3 milliGRAM(s) Oral at bedtime  senna 2 Tablet(s) Oral at bedtime  simvastatin 40 milliGRAM(s) Oral at bedtime    MEDICATIONS  (PRN):  acetaminophen   Tablet .. 650 milliGRAM(s) Oral every 6 hours PRN Temp greater or equal to 38C (100.4F), Mild Pain (1 - 3)      Social History: Former smoker      FAMILY HISTORY: No significant history.      Vital Signs Last 24 Hrs  T(C): 36.6 (14 Oct 2021 14:11), Max: 37 (13 Oct 2021 21:20)  T(F): 97.9 (14 Oct 2021 14:11), Max: 98.6 (13 Oct 2021 21:20)  HR: 74 (14 Oct 2021 17:06) (72 - 79)  BP: 134/62 (14 Oct 2021 17:06) (107/69 - 146/86)  BP(mean): --  RR: 16 (14 Oct 2021 14:11) (16 - 19)  SpO2: 97% (14 Oct 2021 17:06) (96% - 97%)    PHYSICAL EXAM:  Vascular: DP & PT palpable bilaterally, Capillary refill 3 seconds, Digital hair present bilaterally, minimal left foot edema, no varicosities.  Neurological: Light touch sensation intact bilaterally.  Musculoskeletal: 5/5 strength in all quadrants bilaterally, AJ & STJ ROM intact.  Dermatological: s/p left partial hallux amputation with sutures intact. No active drainage. No signs of clinical infection.    CBC Full  -  ( 14 Oct 2021 09:06 )  WBC Count : 21.39 K/uL  RBC Count : 4.61 M/uL  Hemoglobin : 12.9 g/dL  Hematocrit : 40.4 %  Platelet Count - Automated : 162 K/uL  Mean Cell Volume : 87.6 fl  Mean Cell Hemoglobin : 28.0 pg  Mean Cell Hemoglobin Concentration : 31.9 gm/dL  Auto Neutrophil # : 3.64 K/uL  Auto Lymphocyte # : 16.31 K/uL  Auto Monocyte # : 1.03 K/uL  Auto Eosinophil # : 0.28 K/uL  Auto Basophil # : 0.07 K/uL  Auto Neutrophil % : 17.0 %  Auto Lymphocyte % : 76.3 %  Auto Monocyte % : 4.8 %  Auto Eosinophil % : 1.3 %  Auto Basophil % : 0.3 %      10-14    138  |  107  |  24<H>  ----------------------------<  136<H>  4.1   |  23  |  1.40<H>    Ca    8.4<L>      14 Oct 2021 09:06    TPro  7.1  /  Alb  3.1<L>  /  TBili  0.3  /  DBili  x   /  AST  18  /  ALT  15  /  AlkPhos  51  10-13

## 2021-10-14 NOTE — PROGRESS NOTE ADULT - SUBJECTIVE AND OBJECTIVE BOX
INTERVAL HPI/OVERNIGHT EVENTS:  Patient seen and examined at bedside. States he feels well. Patient states overnight he had severe pain in his L foot, resolved after 1x dose of PO dilaudid. Patient states pain is more tolerable this AM. Denies any chest pain, SOB, abd pain.    MEDICATIONS  (STANDING):  aspirin  chewable 81 milliGRAM(s) Oral daily  carvedilol 12.5 milliGRAM(s) Oral every 12 hours  ceFAZolin   IVPB 2000 milliGRAM(s) IV Intermittent every 8 hours  dextrose 40% Gel 15 Gram(s) Oral once  dextrose 5%. 1000 milliLiter(s) (100 mL/Hr) IV Continuous <Continuous>  dextrose 5%. 1000 milliLiter(s) (50 mL/Hr) IV Continuous <Continuous>  dextrose 50% Injectable 12.5 Gram(s) IV Push once  dextrose 50% Injectable 25 Gram(s) IV Push once  glucagon  Injectable 1 milliGRAM(s) IntraMuscular once  heparin   Injectable 5000 Unit(s) SubCutaneous every 8 hours  insulin lispro (ADMELOG) corrective regimen sliding scale   SubCutaneous three times a day before meals  melatonin 3 milliGRAM(s) Oral at bedtime  senna 2 Tablet(s) Oral at bedtime  simvastatin 40 milliGRAM(s) Oral at bedtime    MEDICATIONS  (PRN):  acetaminophen   Tablet .. 650 milliGRAM(s) Oral every 6 hours PRN Temp greater or equal to 38C (100.4F), Mild Pain (1 - 3)  senna 1 Tablet(s) Oral daily PRN Constipation      Allergies    No Known Allergies    Intolerances      REVIEW OF SYSTEMS:  Constitutional: denies fever, chills  HEENT: denies headache, dizziness, or lightheadedness  Respiratory: denies SOB, cough, or wheezing  Cardiovascular: denies CP, palpitations  Gastrointestinal: admits constipation denies nausea, vomiting, diarrhea, abdominal pain, or bloody stools  Genitourinary: denies painful urination, increased frequency, urgency, or bloody urine  Skin/Breast: denies rashes or itching  Musculoskeletal: denies muscle aches, joint swelling, or muscle weakness  Neurologic: denies loss of sensation, numbness, or tingling    Vital Signs Last 24 Hrs  T(C): 36.6 (14 Oct 2021 14:11), Max: 37 (13 Oct 2021 21:20)  T(F): 97.9 (14 Oct 2021 14:11), Max: 98.6 (13 Oct 2021 21:20)  HR: 74 (14 Oct 2021 17:06) (72 - 79)  BP: 134/62 (14 Oct 2021 17:06) (107/69 - 146/86)  BP(mean): --  RR: 16 (14 Oct 2021 14:11) (16 - 19)  SpO2: 97% (14 Oct 2021 17:06) (96% - 97%)      PHYSICAL EXAM:  Gen: well appearing, NAD  HEENT: NCAT, PEERLA b/l, EOMI b/l, no conjunctival erythema  Cardio: regular rate and rhythm, +s1s2, no murmurs, rubs, or gallops  Pulm: CTA b/l, no wheezes, rales or rhonchi  Abdomen: soft, nontender, nondistended, +BS x4 quadrants, no guarding  Extremities: left foot dressings c/d/i  Neuro: AAOx3  Skin: warm and dry      LABS:                        12.9   21.39 )-----------( 162      ( 14 Oct 2021 09:06 )             40.4     10-14    138  |  107  |  24<H>  ----------------------------<  136<H>  4.1   |  23  |  1.40<H>    Ca    8.4<L>      14 Oct 2021 09:06    TPro  7.1  /  Alb  3.1<L>  /  TBili  0.3  /  DBili  x   /  AST  18  /  ALT  15  /  AlkPhos  51  10-13          RADIOLOGY & ADDITIONAL TESTS: Previously Declined (within the last year)

## 2021-10-15 ENCOUNTER — TRANSCRIPTION ENCOUNTER (OUTPATIENT)
Age: 76
End: 2021-10-15

## 2021-10-15 VITALS
TEMPERATURE: 98 F | RESPIRATION RATE: 18 BRPM | SYSTOLIC BLOOD PRESSURE: 113 MMHG | OXYGEN SATURATION: 96 % | HEART RATE: 70 BPM | DIASTOLIC BLOOD PRESSURE: 71 MMHG

## 2021-10-15 DIAGNOSIS — D72.820 LYMPHOCYTOSIS (SYMPTOMATIC): ICD-10-CM

## 2021-10-15 LAB
ANION GAP SERPL CALC-SCNC: 8 MMOL/L — SIGNIFICANT CHANGE UP (ref 5–17)
BUN SERPL-MCNC: 24 MG/DL — HIGH (ref 7–23)
CALCIUM SERPL-MCNC: 8.4 MG/DL — LOW (ref 8.5–10.1)
CHLORIDE SERPL-SCNC: 107 MMOL/L — SIGNIFICANT CHANGE UP (ref 96–108)
CO2 SERPL-SCNC: 22 MMOL/L — SIGNIFICANT CHANGE UP (ref 22–31)
CREAT SERPL-MCNC: 1.4 MG/DL — HIGH (ref 0.5–1.3)
GLUCOSE SERPL-MCNC: 124 MG/DL — HIGH (ref 70–99)
HCT VFR BLD CALC: 40.1 % — SIGNIFICANT CHANGE UP (ref 39–50)
HGB BLD-MCNC: 13 G/DL — SIGNIFICANT CHANGE UP (ref 13–17)
MCHC RBC-ENTMCNC: 28.2 PG — SIGNIFICANT CHANGE UP (ref 27–34)
MCHC RBC-ENTMCNC: 32.4 GM/DL — SIGNIFICANT CHANGE UP (ref 32–36)
MCV RBC AUTO: 87 FL — SIGNIFICANT CHANGE UP (ref 80–100)
NRBC # BLD: 0 /100 WBCS — SIGNIFICANT CHANGE UP (ref 0–0)
PLATELET # BLD AUTO: 164 K/UL — SIGNIFICANT CHANGE UP (ref 150–400)
POTASSIUM SERPL-MCNC: 4.2 MMOL/L — SIGNIFICANT CHANGE UP (ref 3.5–5.3)
POTASSIUM SERPL-SCNC: 4.2 MMOL/L — SIGNIFICANT CHANGE UP (ref 3.5–5.3)
RBC # BLD: 4.61 M/UL — SIGNIFICANT CHANGE UP (ref 4.2–5.8)
RBC # FLD: 14.6 % — HIGH (ref 10.3–14.5)
SODIUM SERPL-SCNC: 137 MMOL/L — SIGNIFICANT CHANGE UP (ref 135–145)
WBC # BLD: 17.69 K/UL — HIGH (ref 3.8–10.5)
WBC # FLD AUTO: 17.69 K/UL — HIGH (ref 3.8–10.5)

## 2021-10-15 PROCEDURE — 99239 HOSP IP/OBS DSCHRG MGMT >30: CPT

## 2021-10-15 PROCEDURE — 99232 SBSQ HOSP IP/OBS MODERATE 35: CPT

## 2021-10-15 RX ORDER — CARVEDILOL PHOSPHATE 80 MG/1
1 CAPSULE, EXTENDED RELEASE ORAL
Qty: 0 | Refills: 0 | DISCHARGE

## 2021-10-15 RX ORDER — CARVEDILOL PHOSPHATE 80 MG/1
1 CAPSULE, EXTENDED RELEASE ORAL
Qty: 0 | Refills: 0 | DISCHARGE
Start: 2021-10-15

## 2021-10-15 RX ADMIN — Medication 100 MILLIGRAM(S): at 13:21

## 2021-10-15 RX ADMIN — Medication 100 MILLIGRAM(S): at 07:48

## 2021-10-15 RX ADMIN — CARVEDILOL PHOSPHATE 12.5 MILLIGRAM(S): 80 CAPSULE, EXTENDED RELEASE ORAL at 07:00

## 2021-10-15 RX ADMIN — Medication 81 MILLIGRAM(S): at 12:06

## 2021-10-15 RX ADMIN — HEPARIN SODIUM 5000 UNIT(S): 5000 INJECTION INTRAVENOUS; SUBCUTANEOUS at 06:59

## 2021-10-15 NOTE — DISCHARGE NOTE NURSING/CASE MANAGEMENT/SOCIAL WORK - PATIENT PORTAL LINK FT
You can access the FollowMyHealth Patient Portal offered by [facility] by registering at the following website: [email or web address]. By joining [facility]’s FollowMyHealth portal, you will also be able to view your health information using other applications (apps) compatible with our system.

## 2021-10-15 NOTE — PROGRESS NOTE ADULT - SUBJECTIVE AND OBJECTIVE BOX
Patient seen and examined at bedside. States he feels well, eager to go home. Denies any chest pain, SOB, abd pain. States pain in L foot is tolerable, dressing changed by Podiatry at bedside today. Patient's leukocytosis downtrending, however remains elevated (as per patient has been elevated in past), advised patient to have close follow up with Heme/Onc as outpatient as there is differential diagnosis does include malignancy. Patient verbalized understanding.    Physical Exam:  Gen: well appearing, NAD  HEENT: NCAT, PEERLA b/l, EOMI b/l, no conjunctival erythema  Cardio: regular rate and rhythm, +s1s2, no murmurs, rubs, or gallops  Pulm: CTA b/l, no wheezes, rales or rhonchi  Abdomen: soft, nontender, nondistended, +BS x4 quadrants, no guarding  Extremities: left foot dressings c/d/i  Neuro: AAOx3  Skin: warm and dry    Please refer to updated D/C note for further details.

## 2021-10-15 NOTE — PROGRESS NOTE ADULT - SUBJECTIVE AND OBJECTIVE BOX
[facility]  Division of Infectious Diseases  [phone]    Name: NAVIN MALDONADO  Age: 75y  Gender: Male  MRN: [id number]    Interval History--  Notes reviewed. Feels fine. No pain. No fevers, chills, or rigors.   Path reviewed- margins without evidence of OM  OR Cx NTD.      Past Medical History--  Diabetes    High cholesterol    Hypertension    History of adenoidectomy        For details regarding the patient's social history, family history, and other miscellaneous elements, please refer the initial infectious diseases consultation and/or the admitting history and physical examination for this admission.    Allergies    No Known Allergies    Intolerances        Medications--  Antibiotics:  ceFAZolin   IVPB 2000 milliGRAM(s) IV Intermittent every 8 hours    Immunologic:    Other:  acetaminophen   Tablet .. PRN  aspirin  chewable  carvedilol  dextrose 40% Gel  dextrose 5%.  dextrose 5%.  dextrose 50% Injectable  dextrose 50% Injectable  glucagon  Injectable  heparin   Injectable  insulin lispro (ADMELOG) corrective regimen sliding scale  melatonin  senna  senna PRN  simvastatin      Review of Systems--  A 10-point review of systems was obtained.   Review of systems otherwise negative except as previously noted.    Physical Examination--  Vital Signs: T(F): 97.8 (10-15-21 @ 05:26), Max: 98.2 (10-14-21 @ 20:24)  HR: 73 (10-15-21 @ 05:26)  BP: 118/72 (10-15-21 @ 05:26)  RR: 17 (10-15-21 @ 05:26)  SpO2: 92% (10-15-21 @ 05:26)  Wt(kg): --  General: Nontoxic-appearing Male in no acute distress.  HEENT: AT/NC. Anicteric. Conjunctiva pink and moist. Oropharynx clear.  Neck: Not rigid. No sense of mass.  Nodes: None palpable.  Lungs: Clear bilaterally without rales, wheezing or rhonchi  Heart: Regular rate and rhythm.   Abdomen: Bowel sounds present and normoactive. Soft. Nondistended. Nontender. No sense of mass. No organomegaly.  Extremities: No cyanosis or clubbing. L Hallux incision weel coapeted, sutures intact, clean.dry. Dependent ecchymotic change without concern of infection or ischemia. No drainage or malodor. No increased calor or tenderness.  Skin: Warm. Dry. Good turgor. No rash. No vasculitic stigmata.  Psychiatric: Appropriate affect and mood for situation.         Laboratory Studies--  CBC                        13.0   17.69 )-----------( 164      ( 15 Oct 2021 09:05 )             40.1       Chemistries  10-14    138  |  107  |  24<H>  ----------------------------<  136<H>  4.1   |  23  |  1.40<H>    Ca    8.4<L>      14 Oct 2021 09:06    TPro  7.1  /  Alb  3.1<L>  /  TBili  0.3  /  DBili  x   /  AST  18  /  ALT  15  /  AlkPhos  51  10-13      Surgical Pathology Report:   ACCESSION No:  [id number]  Patient:   NAVIN MALDONADO      Accession:                             [id number]    Collected Date/Time:                   10/12/2021 09:30 EDT  Received Date/Time:                    10/12/2021 11:15 EDT    Surgical Pathology Report - Auth (Verified)    Specimen(s) Submitted  1  Left distal phalnx bone  2  Left proximal phalanx bones, clean margin    Final Diagnosis  1. Hallux, left distal phalanx, partial amputation:  - Partially healed ulcerated digit with chronic osteomyelitis, epidermal  hyperplasia and ortho- and parakeratosis.    - Histological changes of degenerative joint disease.  - Viable skin, soft tissue and bony margins.    2. Bone, left proximal phalanx, excision of clean margin:  - Negative for osteomyelitis.  - Histological changes of degenerative joint disease.  Verified by: Adan Pierce MD  (Electronic Signature)  Reported on: 10/14/21 10:14 EDT, [facility] [address]  Phone: [phone]   Fax: [phone]  _________________________________________________________________        Culture Data    Culture - Tissue with Gram Stain (collected 12 Oct 2021 15:03)  Source: .Tissue Left distal phalanx bone cultu  Gram Stain (12 Oct 2021 22:59):    No polymorphonuclear cells seen per low power field    No organisms seen per oil power field  Preliminary Report (13 Oct 2021 11:30):    No growth    Culture - Blood (collected 11 Oct 2021 11:25)  Source: .Blood Blood-Peripheral  Preliminary Report (12 Oct 2021 12:01):    No growth to date.    Culture - Blood (collected 11 Oct 2021 11:25)  Source: .Blood Blood-Peripheral  Preliminary Report (12 Oct 2021 12:01):    No growth to date.

## 2021-10-15 NOTE — PROGRESS NOTE ADULT - ASSESSMENT
76 y/o man with PMH of HTN, DM2, with peripheral neuropathy, was admitted with left first distal hallux osteomyelitis as per MRI on 9/22/21.  This in going on for one year.   MRI 9/22 with OM in first distal hallux   Leukocytosis 24k  Wound culture MSSA on 9/30   CRP<3   ESR=11   S/P Partial amputation of left 1st toe      10/15: No concern of residual bony infection. No concern of skin or soft tissue infection at this time. Patient with leukocytosis, lymphocyte predominance. CLL main concern in DDx here. Should see hematology as outpatient.     Recommendations;   Defer additional antibiotics  OPD f/u with podiatry/wound care  Offloading/assistive devices etc. as per podiatry recommendations as applicable.  Patient should have outpatient hematology assessment    D/W patient  I'll sign off at this time  D/W Dr. Cooley    Thank you for the courtesy of this referral.  Giacomo Rachel MD  Attending Physician  [facility]  Division of Infectious Diseases  [phone]    Osteomyelitis of great toe of left foot [M86.9]    Diabetes [E11.9]    Lymphocytosis [D72.820]

## 2021-10-15 NOTE — PROGRESS NOTE ADULT - SUBJECTIVE AND OBJECTIVE BOX
HPI:  75 year old male seen bedside s/p left partial hallux amputation (DOS: 10/14/21) with Dr. Lugo. Pt states that he is in no pain at this time. He states that the Infectious Disease doctor came by earlier and changed the dressing. Denies any fever, chills, nausea, vomiting, chest pain, shortness of breath, or calf pain at this time.      PAST MEDICAL & SURGICAL HISTORY:  Diabetes    High cholesterol    Hypertension    History of adenoidectomy        Allergies    No Known Allergies    Intolerances    MEDICATIONS  (STANDING):  aspirin  chewable 81 milliGRAM(s) Oral daily  carvedilol 12.5 milliGRAM(s) Oral every 12 hours  ceFAZolin   IVPB 2000 milliGRAM(s) IV Intermittent every 8 hours  dextrose 40% Gel 15 Gram(s) Oral once  dextrose 5%. 1000 milliLiter(s) (100 mL/Hr) IV Continuous <Continuous>  dextrose 5%. 1000 milliLiter(s) (50 mL/Hr) IV Continuous <Continuous>  dextrose 50% Injectable 12.5 Gram(s) IV Push once  dextrose 50% Injectable 25 Gram(s) IV Push once  glucagon  Injectable 1 milliGRAM(s) IntraMuscular once  heparin   Injectable 5000 Unit(s) SubCutaneous every 8 hours  insulin lispro (ADMELOG) corrective regimen sliding scale   SubCutaneous three times a day before meals  melatonin 3 milliGRAM(s) Oral at bedtime  senna 2 Tablet(s) Oral at bedtime  simvastatin 40 milliGRAM(s) Oral at bedtime    MEDICATIONS  (PRN):  acetaminophen   Tablet .. 650 milliGRAM(s) Oral every 6 hours PRN Temp greater or equal to 38C (100.4F), Mild Pain (1 - 3)  senna 1 Tablet(s) Oral daily PRN Constipation    Social History: Former smoker      FAMILY HISTORY: No significant history.    Vital Signs Last 24 Hrs  T(C): 36.7 (15 Oct 2021 12:51), Max: 36.8 (14 Oct 2021 20:24)  T(F): 98 (15 Oct 2021 12:51), Max: 98.2 (14 Oct 2021 20:24)  HR: 70 (15 Oct 2021 12:51) (70 - 76)  BP: 113/71 (15 Oct 2021 12:51) (113/71 - 134/62)  BP(mean): --  RR: 18 (15 Oct 2021 12:51) (17 - 18)  SpO2: 96% (15 Oct 2021 12:51) (92% - 97%)    PHYSICAL EXAM:  Vascular: DP & PT palpable bilaterally, Capillary refill 3 seconds, Digital hair absent bilaterally, no left foot edema, no varicosities, left hallux has some discoloration and duskiness, skin temp wnl.  Neurological: Light touch sensation intact bilaterally.  Musculoskeletal: 5/5 strength in all quadrants bilaterally, AJ & STJ ROM intact.  Dermatological: s/p left partial hallux amputation with sutures intact. No active drainage. No signs of clinical infection.    CBC Full  -  ( 15 Oct 2021 09:05 )  WBC Count : 17.69 K/uL  RBC Count : 4.61 M/uL  Hemoglobin : 13.0 g/dL  Hematocrit : 40.1 %  Platelet Count - Automated : 164 K/uL  Mean Cell Volume : 87.0 fl  Mean Cell Hemoglobin : 28.2 pg  Mean Cell Hemoglobin Concentration : 32.4 gm/dL  Auto Neutrophil # : x  Auto Lymphocyte # : x  Auto Monocyte # : x  Auto Eosinophil # : x  Auto Basophil # : x  Auto Neutrophil % : x  Auto Lymphocyte % : x  Auto Monocyte % : x  Auto Eosinophil % : x  Auto Basophil % : x    10-15    137  |  107  |  24<H>  ----------------------------<  124<H>  4.2   |  22  |  1.40<H>    Ca    8.4<L>      15 Oct 2021 09:05      Culture - Tissue with Gram Stain (10.12.21 @ 15:03)    Gram Stain:   No polymorphonuclear cells seen per low power field  No organisms seen per oil power field    Specimen Source: .Tissue Left distal phalanx bone cultu    Culture Results:   No growth

## 2021-10-15 NOTE — PROGRESS NOTE ADULT - PROVIDER SPECIALTY LIST ADULT
Infectious Disease
Anesthesia
Hospitalist
Infectious Disease
Vascular Surgery
Hospitalist
Hospitalist
Nephrology
Hospitalist
Podiatry

## 2021-10-15 NOTE — PROGRESS NOTE ADULT - ASSESSMENT
Prerenal azotemia, CKD 3: Likely diabetic nephropathy  Left hallux OM, s/p Partial amputation of great toe 12-Oct-2021    Diabetes  Hypertension    Stable renal function. To continue current meds. IV abx. Monitor blood sugar levels. Insulin coverage as needed. Dietary restriction.   Monitor BP trend. Titrate BP meds as needed. Salt restriction. Will follow electrolytes and renal function trend. Prerenal azotemia, CKD 3: Likely diabetic nephropathy  Left hallux OM, s/p Partial amputation of great toe 12-Oct-2021    Diabetes  Hypertension    Stable renal function. To continue current meds. Monitor blood sugar levels. Insulin coverage as needed. Dietary restriction.   Monitor BP trend. Titrate BP meds as needed. Salt restriction. Will follow electrolytes and renal function trend.

## 2021-10-15 NOTE — PROGRESS NOTE ADULT - PROBLEM SELECTOR PLAN 2
Pt seen and evaluated.  Removed dressing, cleansed area with normal saline and applied xeroform with DSD and light ACE wrap.  Will continue to monitor skin changes at the left hallux as outpatient.  OR bone culture negative, OR distal phalanx bone path + for chronic OM, clean margin negative for OM.  Per ID, "no concern of residual bony infection. No concern of skin or soft tissue infection at this time. Patient with leukocytosis, lymphocyte predominance. CLL main concern in DDx here. Should see hematology as outpatient."  Pt is stable for discharge from Podiatry standpoint.    WOUND CARE INSTRUCTIONS:  1. Keep dressing clean, dry and intact until follow up.  2. Can be partial weight bearing to the heel in a surgical shoe.  3. Monitor for any signs of infection.  4. Patient is to follow up [facility] with Dr. Lugo or Dr. Hampton within 3-5 days upon discharge. Please call as soon as discharged to make a follow up appt and let them know that it is specifically for a "post-op check".

## 2021-10-15 NOTE — PROGRESS NOTE ADULT - REASON FOR ADMISSION
Left first distal hallux osteomyelitis

## 2021-10-15 NOTE — PROGRESS NOTE ADULT - PROBLEM SELECTOR PROBLEM 2
Osteomyelitis of great toe of left foot

## 2021-10-15 NOTE — PROGRESS NOTE ADULT - SUBJECTIVE AND OBJECTIVE BOX
Patient is a 75y old  Male who presents with a chief complaint of Left first distal hallux osteomyelitis (13 Oct 2021 12:16)    Patient seen in follow up for CKD 3.        PAST MEDICAL HISTORY:  Diabetes    High cholesterol    Hypertension      MEDICATIONS  (STANDING):  aspirin  chewable 81 milliGRAM(s) Oral daily  carvedilol 12.5 milliGRAM(s) Oral every 12 hours  ceFAZolin   IVPB 2000 milliGRAM(s) IV Intermittent every 8 hours  dextrose 40% Gel 15 Gram(s) Oral once  dextrose 5%. 1000 milliLiter(s) (100 mL/Hr) IV Continuous <Continuous>  dextrose 5%. 1000 milliLiter(s) (50 mL/Hr) IV Continuous <Continuous>  dextrose 50% Injectable 12.5 Gram(s) IV Push once  dextrose 50% Injectable 25 Gram(s) IV Push once  glucagon  Injectable 1 milliGRAM(s) IntraMuscular once  heparin   Injectable 5000 Unit(s) SubCutaneous every 8 hours  insulin lispro (ADMELOG) corrective regimen sliding scale   SubCutaneous three times a day before meals  melatonin 3 milliGRAM(s) Oral at bedtime  senna 2 Tablet(s) Oral at bedtime  simvastatin 40 milliGRAM(s) Oral at bedtime    MEDICATIONS  (PRN):  acetaminophen   Tablet .. 650 milliGRAM(s) Oral every 6 hours PRN Temp greater or equal to 38C (100.4F), Mild Pain (1 - 3)  senna 1 Tablet(s) Oral daily PRN Constipation    T(C): 36.7 (10-15-21 @ 12:51), Max: 37 (10-13-21 @ 21:20)  HR: 70 (10-15-21 @ 12:51) (70 - 80)  BP: 113/71 (10-15-21 @ 12:51) (107/69 - 157/75)  RR: 18 (10-15-21 @ 12:51)  SpO2: 96% (10-15-21 @ 12:51)  Wt(kg): --  I&O's Detail    14 Oct 2021 07:01  -  15 Oct 2021 07:00  --------------------------------------------------------  IN:    IV PiggyBack: 50 mL    Oral Fluid: 240 mL  Total IN: 290 mL    OUT:  Total OUT: 0 mL    Total NET: 290 mL              PHYSICAL EXAM:  General: No distress  Respiratory: b/l air entry  Cardiovascular: S1 S2  Gastrointestinal: soft  Extremities:  no edema, left foot dressed             LABORATORY:                        13.0   17.69 )-----------( 164      ( 15 Oct 2021 09:05 )             40.1     10-15    137  |  107  |  24<H>  ----------------------------<  124<H>  4.2   |  22  |  1.40<H>    Ca    8.4<L>      15 Oct 2021 09:05      Sodium, Serum: 137 mmol/L (10-15 @ 09:05)  Sodium, Serum: 138 mmol/L (10-14 @ 09:06)    Potassium, Serum: 4.2 mmol/L (10-15 @ 09:05)  Potassium, Serum: 4.1 mmol/L (10-14 @ 09:06)    Hemoglobin: 13.0 g/dL (10-15 @ 09:05)  Hemoglobin: 12.9 g/dL (10-14 @ 09:06)  Hemoglobin: 13.0 g/dL (10-13 @ 09:33)    Creatinine, Serum 1.40 (10-15 @ 09:05)  Creatinine, Serum 1.40 (10-14 @ 09:06)  Creatinine, Serum 1.70 (10-13 @ 09:33)

## 2021-10-16 LAB
CULTURE RESULTS: SIGNIFICANT CHANGE UP
CULTURE RESULTS: SIGNIFICANT CHANGE UP
SPECIMEN SOURCE: SIGNIFICANT CHANGE UP
SPECIMEN SOURCE: SIGNIFICANT CHANGE UP

## 2021-10-17 LAB
CULTURE RESULTS: SIGNIFICANT CHANGE UP
SPECIMEN SOURCE: SIGNIFICANT CHANGE UP

## 2021-10-19 ENCOUNTER — APPOINTMENT (OUTPATIENT)
Dept: WOUND CARE | Facility: HOSPITAL | Age: 76
End: 2021-10-19
Payer: MEDICARE

## 2021-10-19 ENCOUNTER — OUTPATIENT (OUTPATIENT)
Dept: OUTPATIENT SERVICES | Facility: HOSPITAL | Age: 76
LOS: 1 days | Discharge: HOME | End: 2021-10-19
Payer: MEDICARE

## 2021-10-19 VITALS
SYSTOLIC BLOOD PRESSURE: 121 MMHG | TEMPERATURE: 97.3 F | WEIGHT: 230 LBS | HEIGHT: 72 IN | BODY MASS INDEX: 31.15 KG/M2 | OXYGEN SATURATION: 98 % | HEART RATE: 68 BPM | RESPIRATION RATE: 20 BRPM | DIASTOLIC BLOOD PRESSURE: 72 MMHG

## 2021-10-19 DIAGNOSIS — E11.621 TYPE 2 DIABETES MELLITUS WITH FOOT ULCER: ICD-10-CM

## 2021-10-19 DIAGNOSIS — Z90.89 ACQUIRED ABSENCE OF OTHER ORGANS: Chronic | ICD-10-CM

## 2021-10-19 LAB — SARS-COV-2 RNA SPEC QL NAA+PROBE: SIGNIFICANT CHANGE UP

## 2021-10-19 PROCEDURE — G0463: CPT | Mod: 25

## 2021-10-19 PROCEDURE — U0003: CPT

## 2021-10-19 PROCEDURE — 71046 X-RAY EXAM CHEST 2 VIEWS: CPT | Mod: 26

## 2021-10-19 PROCEDURE — 99212 OFFICE O/P EST SF 10 MIN: CPT

## 2021-10-19 PROCEDURE — 71046 X-RAY EXAM CHEST 2 VIEWS: CPT

## 2021-10-19 PROCEDURE — U0005: CPT

## 2021-10-19 NOTE — PHYSICAL EXAM
[2 x 2] : 2 x 2  [2+] : left 2+ [Ankle Swelling (On Exam)] : not present [Varicose Veins Of Lower Extremities] : not present [] : not present [Skin Ulcer] : no ulcer [Alert] : alert [Oriented to Person] : oriented to person [Oriented to Place] : oriented to place [Calm] : calm [de-identified] : A&Ox3, NAD [de-identified] : HTN, HLD  [de-identified] : 5/5 strength in all quadrants bilaterally [de-identified] : right hallux distal amp healing well ,sutures intact , no soi , plantasr flap cool and slightly dusky [de-identified] : loss of protective sensation bilaterally , NIDDM with neuropathy  [FreeTextEntry1] : Hallux Distal Amp Site- sutures in tact  [FreeTextEntry4] : to bone [de-identified] : serosanguineous [de-identified] : Cleansed with Normal Saline.  [de-identified] : Silver Alginate [TWNoteComboBox1] : Left [TWNoteComboBox4] : None [TWNoteComboBox6] : Surgical [de-identified] : Normal [de-identified] : False [de-identified] : 100% [de-identified] : 3x Weekly [de-identified] : Primary Dressing

## 2021-10-19 NOTE — VITALS
[Pain related to present condition?] : The patient's  pain is not related to present condition. [] : No [de-identified] : 0

## 2021-10-19 NOTE — ASSESSMENT
[Verbal] : Verbal [Demo] : Demo [Patient] : Patient [Good - alert, interested, motivated] : Good - alert, interested, motivated [Verbalizes knowledge/Understanding] : Verbalizes knowledge/understanding [Dressing changes] : dressing changes [Foot Care] : foot care [Skin Care] : skin care [Signs and symptoms of infection] : sign and symptoms of infection [Nutrition] : nutrition [How and When to Call] : how and when to call [Labs and Tests] : labs and tests [Hyperbaric Therapy] : hyperbaric therapy [Patient responsibility to plan of care] : patient responsibility to plan of care [Glycemic Control] : glycemic control [Stable] : stable [Home] : Home [Ambulatory] : Ambulatory [Not Applicable - Long Term Care/Home Health Agency] : Long Term Care/Home Health Agency: Not Applicable [] : Yes [FreeTextEntry2] : HBOT\par Localized Wound Care \par COVID 19 swab\par F/U 1 week pending HBOT authorization\par \par  [FreeTextEntry4] : DPM ordered HBOT, authorization submitted today\par HBO orientation, HBO consent, HBO Pre-Checklist, TM assessment and COVID 19 swab completed today\par DPM ordered chest xray, patient will complete xray post today's WC appointment.\par DPM ordered Xanax, patient is aware of need to  prescription\par F/U 1 week pending HBO authorization.\par

## 2021-10-19 NOTE — REVIEW OF SYSTEMS
[Fever] : no fever [Chills] : no chills [Red Eyes] : eyes not red [Loss Of Hearing] : no hearing loss [Shortness Of Breath] : no shortness of breath [Wheezing] : no wheezing [Abdominal Pain] : no abdominal pain [Joint Stiffness] : joint stiffness [Skin Wound] : skin wound [Anxiety] : no anxiety [Easy Bleeding] : no tendency for easy bleeding [FreeTextEntry5] : HTN , HLD [de-identified] : s/p distal amp hallux left foot , healing well , no soi [de-identified] : diabetic neuropathy [de-identified] : type 2 diabetes , NIDDM

## 2021-10-19 NOTE — HISTORY OF PRESENT ILLNESS
[FreeTextEntry1] : s/p distal amp left great toe , sutures intact plantar flap cool and dusky would benefit from HBOT

## 2021-10-20 ENCOUNTER — APPOINTMENT (OUTPATIENT)
Dept: HYPERBARIC MEDICINE | Facility: HOSPITAL | Age: 76
End: 2021-10-20

## 2021-10-20 DIAGNOSIS — Z20.822 CONTACT WITH AND (SUSPECTED) EXPOSURE TO COVID-19: ICD-10-CM

## 2021-10-20 DIAGNOSIS — E11.40 TYPE 2 DIABETES MELLITUS WITH DIABETIC NEUROPATHY, UNSPECIFIED: ICD-10-CM

## 2021-10-20 DIAGNOSIS — Z98.890 OTHER SPECIFIED POSTPROCEDURAL STATES: ICD-10-CM

## 2021-10-20 DIAGNOSIS — I10 ESSENTIAL (PRIMARY) HYPERTENSION: ICD-10-CM

## 2021-10-20 DIAGNOSIS — E78.2 MIXED HYPERLIPIDEMIA: ICD-10-CM

## 2021-10-20 DIAGNOSIS — Z79.84 LONG TERM (CURRENT) USE OF ORAL HYPOGLYCEMIC DRUGS: ICD-10-CM

## 2021-10-20 DIAGNOSIS — E11.621 TYPE 2 DIABETES MELLITUS WITH FOOT ULCER: ICD-10-CM

## 2021-10-20 DIAGNOSIS — Z87.891 PERSONAL HISTORY OF NICOTINE DEPENDENCE: ICD-10-CM

## 2021-10-20 DIAGNOSIS — M86.671 OTHER CHRONIC OSTEOMYELITIS, RIGHT ANKLE AND FOOT: ICD-10-CM

## 2021-10-20 DIAGNOSIS — Z79.82 LONG TERM (CURRENT) USE OF ASPIRIN: ICD-10-CM

## 2021-10-20 DIAGNOSIS — L97.514 NON-PRESSURE CHRONIC ULCER OF OTHER PART OF RIGHT FOOT WITH NECROSIS OF BONE: ICD-10-CM

## 2021-10-20 DIAGNOSIS — E88.81 METABOLIC SYNDROME AND OTHER INSULIN RESISTANCE: ICD-10-CM

## 2021-10-20 DIAGNOSIS — Z89.412 ACQUIRED ABSENCE OF LEFT GREAT TOE: ICD-10-CM

## 2021-10-20 DIAGNOSIS — E11.69 TYPE 2 DIABETES MELLITUS WITH OTHER SPECIFIED COMPLICATION: ICD-10-CM

## 2021-10-20 DIAGNOSIS — N28.9 DISORDER OF KIDNEY AND URETER, UNSPECIFIED: ICD-10-CM

## 2021-10-20 DIAGNOSIS — L84 CORNS AND CALLOSITIES: ICD-10-CM

## 2021-10-20 DIAGNOSIS — Z79.899 OTHER LONG TERM (CURRENT) DRUG THERAPY: ICD-10-CM

## 2021-10-22 ENCOUNTER — APPOINTMENT (OUTPATIENT)
Dept: HYPERBARIC MEDICINE | Facility: HOSPITAL | Age: 76
End: 2021-10-22

## 2021-10-25 ENCOUNTER — NON-APPOINTMENT (OUTPATIENT)
Age: 76
End: 2021-10-25

## 2021-10-26 ENCOUNTER — OUTPATIENT (OUTPATIENT)
Dept: OUTPATIENT SERVICES | Facility: HOSPITAL | Age: 76
LOS: 1 days | Discharge: HOME | End: 2021-10-26
Payer: MEDICARE

## 2021-10-26 ENCOUNTER — APPOINTMENT (OUTPATIENT)
Dept: WOUND CARE | Facility: HOSPITAL | Age: 76
End: 2021-10-26
Payer: MEDICARE

## 2021-10-26 VITALS
SYSTOLIC BLOOD PRESSURE: 128 MMHG | RESPIRATION RATE: 18 BRPM | OXYGEN SATURATION: 99 % | DIASTOLIC BLOOD PRESSURE: 71 MMHG | HEART RATE: 70 BPM | HEIGHT: 72 IN | BODY MASS INDEX: 31.15 KG/M2 | WEIGHT: 230 LBS | TEMPERATURE: 97.4 F

## 2021-10-26 DIAGNOSIS — Z90.89 ACQUIRED ABSENCE OF OTHER ORGANS: Chronic | ICD-10-CM

## 2021-10-26 DIAGNOSIS — E11.621 TYPE 2 DIABETES MELLITUS WITH FOOT ULCER: ICD-10-CM

## 2021-10-26 PROCEDURE — 86140 C-REACTIVE PROTEIN: CPT

## 2021-10-26 PROCEDURE — 71046 X-RAY EXAM CHEST 2 VIEWS: CPT

## 2021-10-26 PROCEDURE — 86803 HEPATITIS C AB TEST: CPT

## 2021-10-26 PROCEDURE — 83036 HEMOGLOBIN GLYCOSYLATED A1C: CPT

## 2021-10-26 PROCEDURE — 80048 BASIC METABOLIC PNL TOTAL CA: CPT

## 2021-10-26 PROCEDURE — 93925 LOWER EXTREMITY STUDY: CPT

## 2021-10-26 PROCEDURE — 83605 ASSAY OF LACTIC ACID: CPT

## 2021-10-26 PROCEDURE — 86901 BLOOD TYPING SEROLOGIC RH(D): CPT

## 2021-10-26 PROCEDURE — 36415 COLL VENOUS BLD VENIPUNCTURE: CPT

## 2021-10-26 PROCEDURE — 87077 CULTURE AEROBIC IDENTIFY: CPT

## 2021-10-26 PROCEDURE — 85730 THROMBOPLASTIN TIME PARTIAL: CPT

## 2021-10-26 PROCEDURE — 87070 CULTURE OTHR SPECIMN AEROBIC: CPT

## 2021-10-26 PROCEDURE — 93971 EXTREMITY STUDY: CPT

## 2021-10-26 PROCEDURE — 86769 SARS-COV-2 COVID-19 ANTIBODY: CPT

## 2021-10-26 PROCEDURE — 97161 PT EVAL LOW COMPLEX 20 MIN: CPT

## 2021-10-26 PROCEDURE — 85027 COMPLETE CBC AUTOMATED: CPT

## 2021-10-26 PROCEDURE — 82962 GLUCOSE BLOOD TEST: CPT

## 2021-10-26 PROCEDURE — 88304 TISSUE EXAM BY PATHOLOGIST: CPT

## 2021-10-26 PROCEDURE — 93923 UPR/LXTR ART STDY 3+ LVLS: CPT

## 2021-10-26 PROCEDURE — 73630 X-RAY EXAM OF FOOT: CPT

## 2021-10-26 PROCEDURE — 85652 RBC SED RATE AUTOMATED: CPT

## 2021-10-26 PROCEDURE — 87075 CULTR BACTERIA EXCEPT BLOOD: CPT

## 2021-10-26 PROCEDURE — 86850 RBC ANTIBODY SCREEN: CPT

## 2021-10-26 PROCEDURE — 86900 BLOOD TYPING SEROLOGIC ABO: CPT

## 2021-10-26 PROCEDURE — 80053 COMPREHEN METABOLIC PANEL: CPT

## 2021-10-26 PROCEDURE — 85610 PROTHROMBIN TIME: CPT

## 2021-10-26 PROCEDURE — 88311 DECALCIFY TISSUE: CPT

## 2021-10-26 PROCEDURE — 87635 SARS-COV-2 COVID-19 AMP PRB: CPT

## 2021-10-26 PROCEDURE — 93005 ELECTROCARDIOGRAM TRACING: CPT

## 2021-10-26 PROCEDURE — G0463: CPT

## 2021-10-26 PROCEDURE — 99285 EMERGENCY DEPT VISIT HI MDM: CPT | Mod: 25

## 2021-10-26 PROCEDURE — 85025 COMPLETE CBC W/AUTO DIFF WBC: CPT

## 2021-10-26 PROCEDURE — 87040 BLOOD CULTURE FOR BACTERIA: CPT

## 2021-10-26 PROCEDURE — 99212 OFFICE O/P EST SF 10 MIN: CPT

## 2021-10-27 DIAGNOSIS — E11.621 TYPE 2 DIABETES MELLITUS WITH FOOT ULCER: ICD-10-CM

## 2021-10-27 DIAGNOSIS — L84 CORNS AND CALLOSITIES: ICD-10-CM

## 2021-10-27 DIAGNOSIS — E11.40 TYPE 2 DIABETES MELLITUS WITH DIABETIC NEUROPATHY, UNSPECIFIED: ICD-10-CM

## 2021-10-27 DIAGNOSIS — E11.69 TYPE 2 DIABETES MELLITUS WITH OTHER SPECIFIED COMPLICATION: ICD-10-CM

## 2021-10-27 DIAGNOSIS — E88.81 METABOLIC SYNDROME AND OTHER INSULIN RESISTANCE: ICD-10-CM

## 2021-10-27 DIAGNOSIS — Z98.890 OTHER SPECIFIED POSTPROCEDURAL STATES: ICD-10-CM

## 2021-10-27 DIAGNOSIS — Z87.891 PERSONAL HISTORY OF NICOTINE DEPENDENCE: ICD-10-CM

## 2021-10-27 DIAGNOSIS — Z79.84 LONG TERM (CURRENT) USE OF ORAL HYPOGLYCEMIC DRUGS: ICD-10-CM

## 2021-10-27 DIAGNOSIS — Z79.82 LONG TERM (CURRENT) USE OF ASPIRIN: ICD-10-CM

## 2021-10-27 DIAGNOSIS — N28.9 DISORDER OF KIDNEY AND URETER, UNSPECIFIED: ICD-10-CM

## 2021-10-27 DIAGNOSIS — Z89.412 ACQUIRED ABSENCE OF LEFT GREAT TOE: ICD-10-CM

## 2021-10-27 DIAGNOSIS — L97.514 NON-PRESSURE CHRONIC ULCER OF OTHER PART OF RIGHT FOOT WITH NECROSIS OF BONE: ICD-10-CM

## 2021-10-27 DIAGNOSIS — I10 ESSENTIAL (PRIMARY) HYPERTENSION: ICD-10-CM

## 2021-10-27 DIAGNOSIS — M86.671 OTHER CHRONIC OSTEOMYELITIS, RIGHT ANKLE AND FOOT: ICD-10-CM

## 2021-10-27 DIAGNOSIS — E78.2 MIXED HYPERLIPIDEMIA: ICD-10-CM

## 2021-10-27 DIAGNOSIS — Z79.899 OTHER LONG TERM (CURRENT) DRUG THERAPY: ICD-10-CM

## 2021-10-28 NOTE — PLAN
[FreeTextEntry1] : Patient examined and evaluated at this time.\par Given patient's severe diabetic neuropathy and history of bone infection and amputation, patient may end up with an amputation of the left 2nd toe as well. Patient remains at risk for a more proximal amputation, limb loss, sepsis, and death. Patient verbalized understanding and will monitor at this time. If left 2nd digit does not improve, will plan for a partial 2nd toe amputation as well as proceeding with HBOT.\par Continue local wound care and offloading.\par Spent 20 minutes for patient care and medical decision making.\par Patient to follow up in 1 week.\par

## 2021-10-28 NOTE — ASSESSMENT
[Verbal] : Verbal [Patient] : Patient [Good - alert, interested, motivated] : Good - alert, interested, motivated [Verbalizes knowledge/Understanding] : Verbalizes knowledge/understanding [Dressing changes] : dressing changes [Foot Care] : foot care [Skin Care] : skin care [Signs and symptoms of infection] : sign and symptoms of infection [How and When to Call] : how and when to call [Off-loading] : off-loading [Patient responsibility to plan of care] : patient responsibility to plan of care [] : Yes [Stable] : stable [Home] : Home [Ambulatory] : Ambulatory [Not Applicable - Long Term Care/Home Health Agency] : Long Term Care/Home Health Agency: Not Applicable [FreeTextEntry2] : Restore Skin Integrity\par Infection Control\par Localized wound care\par Maintain acceptable pain levels at satisfactory relief.\par Demonstrates use of both nonpharmacological and pharmacological pain relief strategies [FreeTextEntry4] : Photos Taken\par No Hyperbaric Therapy at this time per DPM.\par F/U to [facility] in 1 week

## 2021-10-28 NOTE — REVIEW OF SYSTEMS
[Joint Stiffness] : joint stiffness [Skin Wound] : skin wound [Fever] : no fever [Chills] : no chills [Red Eyes] : eyes not red [Loss Of Hearing] : no hearing loss [Shortness Of Breath] : no shortness of breath [Wheezing] : no wheezing [Abdominal Pain] : no abdominal pain [Anxiety] : no anxiety [Easy Bleeding] : no tendency for easy bleeding [FreeTextEntry5] : HTN , HLD [de-identified] : s/p distal amp hallux left foot , healing well , no soi [de-identified] : diabetic neuropathy [de-identified] : type 2 diabetes , NIDDM

## 2021-10-28 NOTE — HISTORY OF PRESENT ILLNESS
[FreeTextEntry1] : Pt seen s/p distal amp left great toe. Pt relates that he hit his left 2nd toe 1 week prior and notes that he has been dressing the toe with an ointment. Pt relates that he has been also changing the dressing for his left great toe amputation site.

## 2021-10-28 NOTE — PHYSICAL EXAM
[2+] : left 2+ [Alert] : alert [Oriented to Person] : oriented to person [Oriented to Place] : oriented to place [Calm] : calm [Ankle Swelling (On Exam)] : not present [Varicose Veins Of Lower Extremities] : not present [] : not present [Skin Ulcer] : no ulcer [de-identified] : A&Ox3, NAD [de-identified] : HTN, HLD  [de-identified] : 5/5 strength in all quadrants bilaterally [de-identified] : left hallux distal amp healing well ,sutures intact , no soi. left 2nd digit contusion noted with no drainage, no fluctuance, no malodor, no purulence. [de-identified] : loss of protective sensation bilaterally , NIDDM with neuropathy  [FreeTextEntry1] : Left Hallux Distal Amp Site- Sutures Intact [de-identified] : Intact [de-identified] : Silver Alginate [de-identified] : Cleansed with Normal Saline\par  [FreeTextEntry7] : Left Foot 2nd Distal Digit [de-identified] : scant Serosanguineous [de-identified] : Slightly Macerated, ecchymosis [de-identified] : Silver Alginate [de-identified] : Cleansed with Normal Saline\par  [TWNoteComboBox5] : No [TWNoteComboBox4] : None [de-identified] : No [de-identified] : None [de-identified] : 3x Weekly [de-identified] : No [de-identified] : No [de-identified] : 3x Weekly

## 2021-11-02 ENCOUNTER — OUTPATIENT (OUTPATIENT)
Dept: OUTPATIENT SERVICES | Facility: HOSPITAL | Age: 76
LOS: 1 days | Discharge: HOME | End: 2021-11-02
Payer: MEDICARE

## 2021-11-02 ENCOUNTER — APPOINTMENT (OUTPATIENT)
Dept: WOUND CARE | Facility: HOSPITAL | Age: 76
End: 2021-11-02
Payer: MEDICARE

## 2021-11-02 VITALS
HEART RATE: 65 BPM | SYSTOLIC BLOOD PRESSURE: 118 MMHG | BODY MASS INDEX: 31.15 KG/M2 | HEIGHT: 72 IN | OXYGEN SATURATION: 100 % | DIASTOLIC BLOOD PRESSURE: 76 MMHG | TEMPERATURE: 97 F | RESPIRATION RATE: 18 BRPM | WEIGHT: 230 LBS

## 2021-11-02 DIAGNOSIS — E11.69 TYPE 2 DIABETES MELLITUS WITH OTHER SPECIFIED COMPLICATION: ICD-10-CM

## 2021-11-02 DIAGNOSIS — M86.671 OTHER CHRONIC OSTEOMYELITIS, RIGHT ANKLE AND FOOT: ICD-10-CM

## 2021-11-02 DIAGNOSIS — E88.9 TYPE 2 DIABETES MELLITUS WITH OTHER SPECIFIED COMPLICATION: ICD-10-CM

## 2021-11-02 DIAGNOSIS — E11.621 TYPE 2 DIABETES MELLITUS WITH FOOT ULCER: ICD-10-CM

## 2021-11-02 DIAGNOSIS — Z90.89 ACQUIRED ABSENCE OF OTHER ORGANS: Chronic | ICD-10-CM

## 2021-11-02 LAB — SARS-COV-2 RNA SPEC QL NAA+PROBE: SIGNIFICANT CHANGE UP

## 2021-11-02 PROCEDURE — U0005: CPT

## 2021-11-02 PROCEDURE — 99214 OFFICE O/P EST MOD 30 MIN: CPT

## 2021-11-02 PROCEDURE — G0463: CPT

## 2021-11-02 PROCEDURE — U0003: CPT

## 2021-11-02 NOTE — HISTORY OF PRESENT ILLNESS
[FreeTextEntry1] : Pt seen s/p distal amp left great toe. Pt seen for new left 2nd toe distal ulcer down to skin, subcutaneous tissue, and fat. Patient relates that he would like to begin HBOT at this time as his left 2nd toe does not appear to have improved since last visit.

## 2021-11-02 NOTE — PHYSICAL EXAM
[2+] : left 2+ [Alert] : alert [Oriented to Person] : oriented to person [Oriented to Place] : oriented to place [Calm] : calm [Ankle Swelling (On Exam)] : not present [Varicose Veins Of Lower Extremities] : not present [] : not present [Skin Ulcer] : no ulcer [de-identified] : A&Ox3, NAD [de-identified] : HTN, HLD  [de-identified] : 5/5 strength in all quadrants bilaterally [de-identified] : left hallux distal amp healing well ,sutures intact , no soi. left 2nd digit distal ulceration down to skin, subcutaneous tissue, and fat. [de-identified] : loss of protective sensation bilaterally , NIDDM with neuropathy  [FreeTextEntry1] : Left Hallux Distal Amp Site- Sutures removed and steri-strips applied this visit by GUY [de-identified] : Intact [de-identified] : Dry Dressing [de-identified] : Cleansed with Normal Saline\par  [FreeTextEntry7] : Left Foot 2nd Distal Digit [FreeTextEntry8] : 1.0 [FreeTextEntry9] : 1.0 [de-identified] : 0.2 [de-identified] : Serosanguineous [de-identified] : Macerated, ecchymosis [de-identified] : Silver Alginate [de-identified] : Cleansed with Normal Saline\par  [TWNoteComboBox4] : None [TWNoteComboBox5] : No [de-identified] : No [de-identified] : None [de-identified] : 3x Weekly [de-identified] : Small [de-identified] : No [de-identified] : No [de-identified] : 3x Weekly

## 2021-11-02 NOTE — ASSESSMENT
[Verbal] : Verbal [Patient] : Patient [Good - alert, interested, motivated] : Good - alert, interested, motivated [Verbalizes knowledge/Understanding] : Verbalizes knowledge/understanding [Dressing changes] : dressing changes [Foot Care] : foot care [Skin Care] : skin care [Signs and symptoms of infection] : sign and symptoms of infection [How and When to Call] : how and when to call [Off-loading] : off-loading [Patient responsibility to plan of care] : patient responsibility to plan of care [Stable] : stable [Home] : Home [Ambulatory] : Ambulatory [Not Applicable - Long Term Care/Home Health Agency] : Long Term Care/Home Health Agency: Not Applicable [] : No [FreeTextEntry2] : Restore Skin Integrity\par Infection Control\par Localized wound care\par Maintain acceptable pain levels at satisfactory relief.\par Demonstrates use of both nonpharmacological and pharmacological pain relief strategies [FreeTextEntry3] : Left foot 2nd digit wound larger [FreeTextEntry4] : HBOT to be initiated\par HBO consent and pre-checklist completed and signed on visit on 10/19/21. Authorization previously submitted on 10/19/21. Chest XRay and bloodwork completed after visit on 10/19/21. TM assessment completed on 10/19/21\par Covid PCR Nasopharyngeal swab obtained\par DPM escribed Xanax for confinement anxiety..\par F/U to [facility] in 1 week

## 2021-11-02 NOTE — PLAN
[FreeTextEntry1] : Patient examined and evaluated at this time.\par Patient will need HBOT and will benefit.\par Given patient's severe diabetic neuropathy and history of bone infection and amputation, patient may end up with an amputation of the left 2nd toe as well. Patient remains at risk for a more proximal amputation, limb loss, sepsis, and death. Patient to begin HBOT.\par Sutures removed from left hallux surgical site without incident and steri-strips applied.\par Continue local wound care and offloading.\par Spent 30 minutes for patient care and medical decision making.\par Patient to follow up in 1 week.\par

## 2021-11-02 NOTE — REVIEW OF SYSTEMS
[Joint Stiffness] : joint stiffness [Skin Wound] : skin wound [Fever] : no fever [Chills] : no chills [Red Eyes] : eyes not red [Loss Of Hearing] : no hearing loss [Shortness Of Breath] : no shortness of breath [Wheezing] : no wheezing [Abdominal Pain] : no abdominal pain [Anxiety] : no anxiety [Easy Bleeding] : no tendency for easy bleeding [FreeTextEntry5] : HTN , HLD [de-identified] : s/p distal amp hallux left foot , healing well , no soi. new left 2nd distal ulcer down to skin, subcutaneous tissue, fat [de-identified] : diabetic neuropathy [de-identified] : type 2 diabetes , NIDDM

## 2021-11-03 ENCOUNTER — APPOINTMENT (OUTPATIENT)
Dept: HYPERBARIC MEDICINE | Facility: HOSPITAL | Age: 76
End: 2021-11-03
Payer: MEDICARE

## 2021-11-03 ENCOUNTER — OUTPATIENT (OUTPATIENT)
Dept: OUTPATIENT SERVICES | Facility: HOSPITAL | Age: 76
LOS: 1 days | Discharge: HOME | End: 2021-11-03
Payer: MEDICARE

## 2021-11-03 ENCOUNTER — NON-APPOINTMENT (OUTPATIENT)
Age: 76
End: 2021-11-03

## 2021-11-03 VITALS
DIASTOLIC BLOOD PRESSURE: 70 MMHG | SYSTOLIC BLOOD PRESSURE: 106 MMHG | OXYGEN SATURATION: 100 % | RESPIRATION RATE: 18 BRPM | TEMPERATURE: 97.2 F | HEART RATE: 68 BPM

## 2021-11-03 VITALS
TEMPERATURE: 96.9 F | SYSTOLIC BLOOD PRESSURE: 101 MMHG | OXYGEN SATURATION: 96 % | HEART RATE: 94 BPM | RESPIRATION RATE: 18 BRPM | DIASTOLIC BLOOD PRESSURE: 69 MMHG

## 2021-11-03 DIAGNOSIS — E88.81 METABOLIC SYNDROME AND OTHER INSULIN RESISTANCE: ICD-10-CM

## 2021-11-03 DIAGNOSIS — Z87.891 PERSONAL HISTORY OF NICOTINE DEPENDENCE: ICD-10-CM

## 2021-11-03 DIAGNOSIS — E11.69 TYPE 2 DIABETES MELLITUS WITH OTHER SPECIFIED COMPLICATION: ICD-10-CM

## 2021-11-03 DIAGNOSIS — Z89.412 ACQUIRED ABSENCE OF LEFT GREAT TOE: ICD-10-CM

## 2021-11-03 DIAGNOSIS — E11.621 TYPE 2 DIABETES MELLITUS WITH FOOT ULCER: ICD-10-CM

## 2021-11-03 DIAGNOSIS — Z79.899 OTHER LONG TERM (CURRENT) DRUG THERAPY: ICD-10-CM

## 2021-11-03 DIAGNOSIS — N28.9 DISORDER OF KIDNEY AND URETER, UNSPECIFIED: ICD-10-CM

## 2021-11-03 DIAGNOSIS — L97.422 NON-PRESSURE CHRONIC ULCER OF LEFT HEEL AND MIDFOOT WITH FAT LAYER EXPOSED: ICD-10-CM

## 2021-11-03 DIAGNOSIS — L97.514 NON-PRESSURE CHRONIC ULCER OF OTHER PART OF RIGHT FOOT WITH NECROSIS OF BONE: ICD-10-CM

## 2021-11-03 DIAGNOSIS — Z98.890 OTHER SPECIFIED POSTPROCEDURAL STATES: ICD-10-CM

## 2021-11-03 DIAGNOSIS — I10 ESSENTIAL (PRIMARY) HYPERTENSION: ICD-10-CM
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
## 2021-11-03 DIAGNOSIS — M86.671 OTHER CHRONIC OSTEOMYELITIS, RIGHT ANKLE AND FOOT: ICD-10-CM

## 2021-11-03 DIAGNOSIS — Z79.82 LONG TERM (CURRENT) USE OF ASPIRIN: ICD-10-CM

## 2021-11-03 DIAGNOSIS — E11.40 TYPE 2 DIABETES MELLITUS WITH DIABETIC NEUROPATHY, UNSPECIFIED: ICD-10-CM

## 2021-11-03 DIAGNOSIS — Z79.84 LONG TERM (CURRENT) USE OF ORAL HYPOGLYCEMIC DRUGS: ICD-10-CM

## 2021-11-03 DIAGNOSIS — E78.2 MIXED HYPERLIPIDEMIA: ICD-10-CM

## 2021-11-03 DIAGNOSIS — T86.828 OTHER COMPLICATIONS OF SKIN GRAFT (ALLOGRAFT) (AUTOGRAFT): ICD-10-CM

## 2021-11-03 DIAGNOSIS — Z90.89 ACQUIRED ABSENCE OF OTHER ORGANS: Chronic | ICD-10-CM

## 2021-11-03 DIAGNOSIS — L84 CORNS AND CALLOSITIES: ICD-10-CM

## 2021-11-03 PROCEDURE — 99183 HYPERBARIC OXYGEN THERAPY: CPT

## 2021-11-03 PROCEDURE — 82962 GLUCOSE BLOOD TEST: CPT

## 2021-11-03 PROCEDURE — G0277: CPT

## 2021-11-03 RX ORDER — ALPRAZOLAM 0.5 MG/1
0.5 TABLET ORAL
Qty: 7 | Refills: 0 | Status: COMPLETED | COMMUNITY
Start: 2021-11-03 | End: 2021-11-10

## 2021-11-03 NOTE — ADDENDUM
[FreeTextEntry1] : The pt. presented to [facility] ambulatory and A&Ox3 for scheduled HBOT tx.\par \par The pt's pre-dive screening found contraindicated dressings applied x patient.\par RN advised of same and performed wound care prior to start of HBOT tx.\par The pt. was advised that [facility] will provide chamber appropriate materials for him to use in performance of at home wound care to avoid delays during future txs. Pt. agreed to same. \par \par The pt's pre-dive screening was found to be within acceptable limits to begin HBOT tx.\par \par The pt. was provided detailed overview of HBOT tx. process, including pre- and post- tx. V/S, S/S of hypoglycemia (low blood sugar), communication and availability of chamberside tech. during tx., (emergency) use of NRB med. air mask, emergency glucose tablet location/consumption, grounding bracelet use and troubleshooting, and equalization techniques (with successful pt. demonstration of same at ambient pressure) prior to start of HBOT tx. The pt. demonstrated strong comprehension of all topics discussed. All of the pt's questions were answered to the pt's satisfaction. \par \par The pt. stated compliance with Rx'd XANAX prior to arrival at [facility].\par Pt. stated he felt "a little buzz." Pt. questioned potential for increased dosage of XANAX if needed.\par Pt. was advised that, pending his tolerance of HBOT tx., pt. may discuss same with prescribing physician post-tx. Pt. agreed to same.\par \par The pt. descended @ 1.75 PSI/min. to Rx'd HBOT tx. depth of 2.0 MABEL in chamber # 4.\par During descent, pt. requested increase in PVHO ventilation for comfort. PVHO ventilation was increased from 275 lpm to 450 lpm (max. vent. rate) at pt's request.\par Pt. expressed comfort after ventilation rate was increased.\par \par The pt. was observed with visible chest motion and without incident for the duration of HBOT tx.\par \par TRANSFER OF CARE FROM [facility] TO \par PT ASCENDED FROM 2.0 MABEL @ 1.75 PSI/MIN WITHOUT INCIDENT\par PT TOLERATED TX WELL

## 2021-11-03 NOTE — PROCEDURE
[Outpatient] : Outpatient [Ambulatory] : Patient is ambulatory. [THIS CHAMBER HAS BEEN CLEANED / DISINFECTED] : This chamber has been cleaned / disinfected according to local and hospital policy and procedure prior to this treatment. [Patient demonstrated and verbalized proper technique for using air break mask] : Patient demonstrated and verbalized proper technique for using air break mask [Patient educated on the risks of SMOKING prior to HBOT with understanding] : Patient educated on the risks of SMOKING prior to HBOT with understanding [Patient educated on the risks of CONSUMING ALCOHOL prior to HBOT with understanding] : Patient educated on the risks of CONSUMING ALCOHOL prior to HBOT with understanding [100% Cotton] : 100% cotton [Empty all pockets] : empty all pockets [No hair oils, wigs, hairpieces, pins] : no hair oils, wigs, hairpieces, pins  [Pre tx medications] : pre tx medications  [No make-up, creams] : no make-up, creams  [No jewelry] : no jewelry  [No matches, cigarettes, lighters] : no matches, cigarettes, lighters  [Hearing aid removed] : hearing aid removed [Dentures removed] : dentures removed [Ground bracelet on pt's wrist] : ground bracelet on pt's wrist  [Contacts removed] : contacts removed  [Remove nail polish] : remove nail polish  [No reading material] : no reading material  [Bra, undergarments removed] : bra, undergarments removed  [No contraindicated dressings] : no contraindicated dressings [Ground Wire - VISUAL Verification - Intact/Free of Obstruction] : Ground Wire - VISUAL Verification - Intact/Free of Obstruction  [Ground Continuity - Verified < 1 ohm w/ Wrist Strap Lamin] : Ground Continuity - Verified < 1 ohm w/ Wrist Strap Lamin [Number: ___] : Number: [unfilled] [Diagnosis: ___] : Diagnosis: [unfilled] [____] : Post-Dive: Time - [unfilled] [___] : Post-Dive: Value - [unfilled] mg/dL [Clear all fields] : clear all fields [] : No [FreeTextEntry3] : 90 [FreeTextEntry5] : 10 : 45 [FreeTextEntry7] : 10 : 54 [[facility]] : 2781 [de-identified] : 7412 [de-identified] : 108 MINUTES [de-identified] : LOLA BEVERLY, 11/02/2021

## 2021-11-04 ENCOUNTER — APPOINTMENT (OUTPATIENT)
Dept: HYPERBARIC MEDICINE | Facility: HOSPITAL | Age: 76
End: 2021-11-04
Payer: MEDICARE

## 2021-11-04 ENCOUNTER — OUTPATIENT (OUTPATIENT)
Dept: OUTPATIENT SERVICES | Facility: HOSPITAL | Age: 76
LOS: 1 days | End: 2021-11-04
Payer: MEDICARE

## 2021-11-04 VITALS
SYSTOLIC BLOOD PRESSURE: 132 MMHG | RESPIRATION RATE: 18 BRPM | OXYGEN SATURATION: 96 % | HEART RATE: 67 BPM | TEMPERATURE: 97.3 F | DIASTOLIC BLOOD PRESSURE: 69 MMHG

## 2021-11-04 VITALS
DIASTOLIC BLOOD PRESSURE: 81 MMHG | RESPIRATION RATE: 20 BRPM | SYSTOLIC BLOOD PRESSURE: 137 MMHG | HEART RATE: 79 BPM | TEMPERATURE: 97.1 F | OXYGEN SATURATION: 100 %

## 2021-11-04 DIAGNOSIS — E11.621 TYPE 2 DIABETES MELLITUS WITH FOOT ULCER: ICD-10-CM

## 2021-11-04 DIAGNOSIS — Z90.89 ACQUIRED ABSENCE OF OTHER ORGANS: Chronic | ICD-10-CM

## 2021-11-04 PROCEDURE — 82962 GLUCOSE BLOOD TEST: CPT

## 2021-11-04 PROCEDURE — 99183 HYPERBARIC OXYGEN THERAPY: CPT

## 2021-11-04 NOTE — ADDENDUM
[FreeTextEntry1] : PT ARRIVED A&OX3\par ALL VITALS WITHIN PARAMETERS FOR HBOT\par PT DESCENDED TO 2.0 MABEL @ 1.75 PSI/MIN IN CHAMBER #2 WITHOUT INCIDENT\par PT RESTING AT TX DEPTH WITH VISIBLE CHEST RISE AND FALL OBSERVED CHAMBER SIDE\par PT ASCENDED FROM 2.0 MABEL @ 1.75 PSI/MIN WITHOUT INCIDENT\par PT TOLERATED TX WELL

## 2021-11-04 NOTE — PROCEDURE
[Outpatient] : Outpatient [Ambulatory] : Patient is ambulatory. [THIS CHAMBER HAS BEEN CLEANED / DISINFECTED] : This chamber has been cleaned / disinfected according to local and hospital policy and procedure prior to this treatment. [Patient demonstrated and verbalized proper technique for using air break mask] : Patient demonstrated and verbalized proper technique for using air break mask [Patient educated on the risks of SMOKING prior to HBOT with understanding] : Patient educated on the risks of SMOKING prior to HBOT with understanding [Patient educated on the risks of CONSUMING ALCOHOL prior to HBOT with understanding] : Patient educated on the risks of CONSUMING ALCOHOL prior to HBOT with understanding [100% Cotton] : 100% cotton [Empty all pockets] : empty all pockets [No hair oils, wigs, hairpieces, pins] : no hair oils, wigs, hairpieces, pins  [Pre tx medications] : pre tx medications  [No make-up, creams] : no make-up, creams  [No jewelry] : no jewelry  [No matches, cigarettes, lighters] : no matches, cigarettes, lighters  [Hearing aid removed] : hearing aid removed [Dentures removed] : dentures removed [Ground bracelet on pt's wrist] : ground bracelet on pt's wrist  [Contacts removed] : contacts removed  [Remove nail polish] : remove nail polish  [No reading material] : no reading material  [Bra, undergarments removed] : bra, undergarments removed  [No contraindicated dressings] : no contraindicated dressings [Ground Wire - VISUAL Verification - Intact/Free of Obstruction] : Ground Wire - VISUAL Verification - Intact/Free of Obstruction  [Ground Continuity - Verified < 1 ohm w/ Wrist Strap Lamin] : Ground Continuity - Verified < 1 ohm w/ Wrist Strap Lamin [Number: ___] : Number: [unfilled] [Diagnosis: ___] : Diagnosis: [unfilled] [____] : Post-Dive: Time - [unfilled] [___] : Post-Dive: Value - [unfilled] mg/dL [Clear all fields] : clear all fields [] : No [FreeTextEntry3] : 90 [FreeTextEntry5] : 3869 [FreeTextEntry7] : 5370 [FreeTextEntry9] : 2616 [de-identified] : 100 [de-identified] : 108  MINUTES

## 2021-11-05 ENCOUNTER — APPOINTMENT (OUTPATIENT)
Dept: HYPERBARIC MEDICINE | Facility: HOSPITAL | Age: 76
End: 2021-11-05
Payer: MEDICARE

## 2021-11-05 ENCOUNTER — OUTPATIENT (OUTPATIENT)
Dept: OUTPATIENT SERVICES | Facility: HOSPITAL | Age: 76
LOS: 1 days | Discharge: HOME | End: 2021-11-05
Payer: MEDICARE

## 2021-11-05 VITALS
RESPIRATION RATE: 20 BRPM | TEMPERATURE: 97 F | OXYGEN SATURATION: 100 % | DIASTOLIC BLOOD PRESSURE: 48 MMHG | HEART RATE: 70 BPM | SYSTOLIC BLOOD PRESSURE: 136 MMHG

## 2021-11-05 VITALS
SYSTOLIC BLOOD PRESSURE: 111 MMHG | DIASTOLIC BLOOD PRESSURE: 67 MMHG | HEART RATE: 75 BPM | TEMPERATURE: 97.6 F | RESPIRATION RATE: 18 BRPM | OXYGEN SATURATION: 98 %

## 2021-11-05 DIAGNOSIS — E11.621 TYPE 2 DIABETES MELLITUS WITH FOOT ULCER: ICD-10-CM

## 2021-11-05 DIAGNOSIS — Z90.89 ACQUIRED ABSENCE OF OTHER ORGANS: Chronic | ICD-10-CM

## 2021-11-05 PROCEDURE — G0277: CPT

## 2021-11-05 PROCEDURE — 82962 GLUCOSE BLOOD TEST: CPT

## 2021-11-05 PROCEDURE — 99183 HYPERBARIC OXYGEN THERAPY: CPT

## 2021-11-08 ENCOUNTER — APPOINTMENT (OUTPATIENT)
Dept: HYPERBARIC MEDICINE | Facility: HOSPITAL | Age: 76
End: 2021-11-08
Payer: MEDICARE

## 2021-11-08 ENCOUNTER — OUTPATIENT (OUTPATIENT)
Dept: OUTPATIENT SERVICES | Facility: HOSPITAL | Age: 76
LOS: 1 days | Discharge: HOME | End: 2021-11-08
Payer: MEDICARE

## 2021-11-08 VITALS
HEART RATE: 73 BPM | DIASTOLIC BLOOD PRESSURE: 79 MMHG | TEMPERATURE: 96.7 F | OXYGEN SATURATION: 99 % | RESPIRATION RATE: 20 BRPM | SYSTOLIC BLOOD PRESSURE: 137 MMHG

## 2021-11-08 VITALS
RESPIRATION RATE: 16 BRPM | SYSTOLIC BLOOD PRESSURE: 127 MMHG | DIASTOLIC BLOOD PRESSURE: 77 MMHG | OXYGEN SATURATION: 99 % | HEART RATE: 83 BPM | TEMPERATURE: 97.4 F

## 2021-11-08 DIAGNOSIS — E11.621 TYPE 2 DIABETES MELLITUS WITH FOOT ULCER: ICD-10-CM

## 2021-11-08 DIAGNOSIS — Z90.89 ACQUIRED ABSENCE OF OTHER ORGANS: Chronic | ICD-10-CM

## 2021-11-08 LAB — SARS-COV-2 RNA SPEC QL NAA+PROBE: SIGNIFICANT CHANGE UP

## 2021-11-08 PROCEDURE — 99183 HYPERBARIC OXYGEN THERAPY: CPT

## 2021-11-08 PROCEDURE — 82962 GLUCOSE BLOOD TEST: CPT

## 2021-11-08 PROCEDURE — G0277: CPT

## 2021-11-08 PROCEDURE — U0003: CPT

## 2021-11-08 PROCEDURE — U0005: CPT

## 2021-11-08 NOTE — ADDENDUM
[FreeTextEntry1] : PT ARRIVED AMBULATORY A&OX3.\par ALL VITALS WITHIN PARAMETERS FOR HBOT.\par NO DRAINAGE NOTED ON DRESSING PRIOR TO DESCENT. WOUND CARE TO FOLLOW HBOT\par PT DESCENT TO RX TX DEPTH IN CHAMBER 2 WAS WITHOUT INCIDENT.\par PT RESTING AT TX DEPTH, CHEST RISE AND FALL OBSERVED.\par TRANSFER OF CARE @ 8:23.\par TRANSFER RETURNED DURING ASCENT.\par PT ASCENT WAS WITHOUT INCIDENT. PT TOLERATED HBOT WELL.\par \par \par CHT ISAAC RUFFIN 11/08/21

## 2021-11-08 NOTE — PROCEDURE
[Outpatient] : Outpatient [THIS CHAMBER HAS BEEN CLEANED / DISINFECTED] : This chamber has been cleaned / disinfected according to local and hospital policy and procedure prior to this treatment. [Patient demonstrated and verbalized proper technique for using air break mask] : Patient demonstrated and verbalized proper technique for using air break mask [Patient educated on the risks of SMOKING prior to HBOT with understanding] : Patient educated on the risks of SMOKING prior to HBOT with understanding [Patient educated on the risks of CONSUMING ALCOHOL prior to HBOT with understanding] : Patient educated on the risks of CONSUMING ALCOHOL prior to HBOT with understanding [100% Cotton] : 100% cotton [Empty all pockets] : empty all pockets [No hair oils, wigs, hairpieces, pins] : no hair oils, wigs, hairpieces, pins  [Pre tx medications] : pre tx medications  [No make-up, creams] : no make-up, creams  [No jewelry] : no jewelry  [No matches, cigarettes, lighters] : no matches, cigarettes, lighters  [Hearing aid removed] : hearing aid removed [Dentures removed] : dentures removed [Ground bracelet on pt's wrist] : ground bracelet on pt's wrist  [Contacts removed] : contacts removed  [Remove nail polish] : remove nail polish  [No reading material] : no reading material  [Bra, undergarments removed] : bra, undergarments removed  [No contraindicated dressings] : no contraindicated dressings [Ground Wire - VISUAL Verification - Intact/Free of Obstruction] : Ground Wire - VISUAL Verification - Intact/Free of Obstruction  [Ground Continuity - Verified < 1 ohm w/ Wrist Strap Lamin] : Ground Continuity - Verified < 1 ohm w/ Wrist Strap Lamin [Number: ___] : Number: [unfilled] [Diagnosis: ___] : Diagnosis: [unfilled] [____] : Post-Dive: Time - [unfilled] [___] : Post-Dive: Value - [unfilled] mg/dL [Clear all fields] : clear all fields [] : No [FreeTextEntry3] : 90 [FreeTextEntry5] : 8:12 [FreeTextEntry7] : 8:21 [FreeTextEntry9] : 9:51 [de-identified] : 10:00 [de-identified] : 108 min

## 2021-11-09 ENCOUNTER — APPOINTMENT (OUTPATIENT)
Dept: HYPERBARIC MEDICINE | Facility: HOSPITAL | Age: 76
End: 2021-11-09
Payer: MEDICARE

## 2021-11-09 ENCOUNTER — OUTPATIENT (OUTPATIENT)
Dept: OUTPATIENT SERVICES | Facility: HOSPITAL | Age: 76
LOS: 1 days | Discharge: HOME | End: 2021-11-09
Payer: MEDICARE

## 2021-11-09 VITALS
OXYGEN SATURATION: 100 % | HEART RATE: 69 BPM | RESPIRATION RATE: 18 BRPM | SYSTOLIC BLOOD PRESSURE: 147 MMHG | DIASTOLIC BLOOD PRESSURE: 76 MMHG | TEMPERATURE: 97.2 F

## 2021-11-09 VITALS
TEMPERATURE: 97.1 F | SYSTOLIC BLOOD PRESSURE: 143 MMHG | HEART RATE: 80 BPM | DIASTOLIC BLOOD PRESSURE: 84 MMHG | OXYGEN SATURATION: 99 % | RESPIRATION RATE: 20 BRPM

## 2021-11-09 DIAGNOSIS — T86.828 OTHER COMPLICATIONS OF SKIN GRAFT (ALLOGRAFT) (AUTOGRAFT): ICD-10-CM

## 2021-11-09 DIAGNOSIS — L97.422 NON-PRESSURE CHRONIC ULCER OF LEFT HEEL AND MIDFOOT WITH FAT LAYER EXPOSED: ICD-10-CM

## 2021-11-09 DIAGNOSIS — E11.621 TYPE 2 DIABETES MELLITUS WITH FOOT ULCER: ICD-10-CM

## 2021-11-09 DIAGNOSIS — L97.514 NON-PRESSURE CHRONIC ULCER OF OTHER PART OF RIGHT FOOT WITH NECROSIS OF BONE: ICD-10-CM
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
## 2021-11-09 DIAGNOSIS — Z20.822 CONTACT WITH AND (SUSPECTED) EXPOSURE TO COVID-19: ICD-10-CM

## 2021-11-09 DIAGNOSIS — Z90.89 ACQUIRED ABSENCE OF OTHER ORGANS: Chronic | ICD-10-CM

## 2021-11-09 PROCEDURE — G0277: CPT

## 2021-11-09 PROCEDURE — 99183 HYPERBARIC OXYGEN THERAPY: CPT

## 2021-11-09 PROCEDURE — 82962 GLUCOSE BLOOD TEST: CPT

## 2021-11-09 NOTE — PROCEDURE
[Outpatient] : Outpatient [Ambulatory] : Patient is ambulatory. [THIS CHAMBER HAS BEEN CLEANED / DISINFECTED] : This chamber has been cleaned / disinfected according to local and hospital policy and procedure prior to this treatment. [Patient demonstrated and verbalized proper technique for using air break mask] : Patient demonstrated and verbalized proper technique for using air break mask [Patient educated on the risks of SMOKING prior to HBOT with understanding] : Patient educated on the risks of SMOKING prior to HBOT with understanding [Patient educated on the risks of CONSUMING ALCOHOL prior to HBOT with understanding] : Patient educated on the risks of CONSUMING ALCOHOL prior to HBOT with understanding [100% Cotton] : 100% cotton [Empty all pockets] : empty all pockets [No hair oils, wigs, hairpieces, pins] : no hair oils, wigs, hairpieces, pins  [Pre tx medications] : pre tx medications  [No jewelry] : no jewelry  [No matches, cigarettes, lighters] : no matches, cigarettes, lighters  [Hearing aid removed] : hearing aid removed [Dentures removed] : dentures removed [Ground bracelet on pt's wrist] : ground bracelet on pt's wrist  [Contacts removed] : contacts removed  [No reading material] : no reading material  [Remove nail polish] : remove nail polish  [Bra, undergarments removed] : bra, undergarments removed  [No contraindicated dressings] : no contraindicated dressings [Ground Wire - VISUAL Verification - Intact/Free of Obstruction] : Ground Wire - VISUAL Verification - Intact/Free of Obstruction  [Ground Continuity - Verified < 1 ohm w/ Wrist Strap Lamin] : Ground Continuity - Verified < 1 ohm w/ Wrist Strap Lamin [Number: ___] : Number: [unfilled] [Diagnosis: ___] : Diagnosis: [unfilled] [____] : Post-Dive: Time - [unfilled] [___] : Post-Dive: Value - [unfilled] mg/dL [Clear all fields] : clear all fields [] : No [FreeTextEntry3] : 90 [FreeTextEntry5] : 6293 [FreeTextEntry7] : 9916 [FreeTextEntry9] : 5567 [de-identified] : 1007 [de-identified] : 108 MINUTES

## 2021-11-09 NOTE — ADDENDUM
[FreeTextEntry1] : PT ARRIVED A&OX3\par ALL VITALS WITHIN PARAMTERS FOR HBOT\par PT DESCENDED TO 2.0 MABEL @ 1.75 PSI/MIN IN CHAMBER #1 WITHOUT INCIDENT\par PT RESTING AT TX DEPTH WITH VISIBLE CHEST RISE AND FALL OBSERVED CHAMBER SIDE\par PT ASCENDED FROM 2.0 MABEL @ 1.75 PSI/MIN WITHOUT INCIDENT\par PT TOELRATED TX WELL

## 2021-11-09 NOTE — PROCEDURE
[Outpatient] : Outpatient [Ambulatory] : Patient is ambulatory. [THIS CHAMBER HAS BEEN CLEANED / DISINFECTED] : This chamber has been cleaned / disinfected according to local and hospital policy and procedure prior to this treatment. [Patient demonstrated and verbalized proper technique for using air break mask] : Patient demonstrated and verbalized proper technique for using air break mask [Patient educated on the risks of SMOKING prior to HBOT with understanding] : Patient educated on the risks of SMOKING prior to HBOT with understanding [Patient educated on the risks of CONSUMING ALCOHOL prior to HBOT with understanding] : Patient educated on the risks of CONSUMING ALCOHOL prior to HBOT with understanding [100% Cotton] : 100% cotton [Empty all pockets] : empty all pockets [No hair oils, wigs, hairpieces, pins] : no hair oils, wigs, hairpieces, pins  [Pre tx medications] : pre tx medications  [No make-up, creams] : no make-up, creams  [No jewelry] : no jewelry  [No matches, cigarettes, lighters] : no matches, cigarettes, lighters  [Hearing aid removed] : hearing aid removed [Dentures removed] : dentures removed [Ground bracelet on pt's wrist] : ground bracelet on pt's wrist  [Contacts removed] : contacts removed  [Remove nail polish] : remove nail polish  [No reading material] : no reading material  [Bra, undergarments removed] : bra, undergarments removed  [No contraindicated dressings] : no contraindicated dressings [Ground Wire - VISUAL Verification - Intact/Free of Obstruction] : Ground Wire - VISUAL Verification - Intact/Free of Obstruction  [Ground Continuity - Verified < 1 ohm w/ Wrist Strap Lamin] : Ground Continuity - Verified < 1 ohm w/ Wrist Strap Lamin [Number: ___] : Number: [unfilled] [Diagnosis: ___] : Diagnosis: [unfilled] [____] : Post-Dive: Time - [unfilled] [___] : Post-Dive: Value - [unfilled] mg/dL [Clear all fields] : clear all fields [] : No [FreeTextEntry3] : 90 [FreeTextEntry5] : 6979 [FreeTextEntry7] : 7280 [FreeTextEntry9] : 1243 [de-identified] : 1000 [de-identified] : 108 MINUTES

## 2021-11-09 NOTE — ADDENDUM
[FreeTextEntry1] : PT ARRIVED A&OX3 \par ALL VITALS WITHIN PARAMETERS FOR HBOT\par PT DESCENDED TO 2.0 MABEL @ 1.75 PSI/MIN IN CHAMBER #2 WITHOUT INCIDENT\par PT RESTING AT TX DEPTH WITH VISIBLE CHEST RISE AND FALL OBSERVED CHAMBER SIDE\par PT ASCENDED FROM 2.0 MABEL @ 1.75 PSI/MIN WITHOUT INCIDENT\par PT TOLERATED TX WELL\par PT RECEIVED WOUND CARE POST HBOT

## 2021-11-10 ENCOUNTER — OUTPATIENT (OUTPATIENT)
Dept: OUTPATIENT SERVICES | Facility: HOSPITAL | Age: 76
LOS: 1 days | Discharge: HOME | End: 2021-11-10
Payer: MEDICARE

## 2021-11-10 ENCOUNTER — APPOINTMENT (OUTPATIENT)
Dept: WOUND CARE | Facility: HOSPITAL | Age: 76
End: 2021-11-10
Payer: MEDICARE

## 2021-11-10 VITALS
HEART RATE: 75 BPM | SYSTOLIC BLOOD PRESSURE: 113 MMHG | WEIGHT: 230 LBS | HEIGHT: 72 IN | OXYGEN SATURATION: 96 % | RESPIRATION RATE: 18 BRPM | DIASTOLIC BLOOD PRESSURE: 68 MMHG | BODY MASS INDEX: 31.15 KG/M2 | TEMPERATURE: 97 F

## 2021-11-10 DIAGNOSIS — E11.621 TYPE 2 DIABETES MELLITUS WITH FOOT ULCER: ICD-10-CM

## 2021-11-10 DIAGNOSIS — E11.52 TYPE 2 DIABETES MELLITUS WITH DIABETIC PERIPHERAL ANGIOPATHY WITH GANGRENE: ICD-10-CM

## 2021-11-10 DIAGNOSIS — Z90.89 ACQUIRED ABSENCE OF OTHER ORGANS: Chronic | ICD-10-CM

## 2021-11-10 PROCEDURE — G0463: CPT

## 2021-11-10 PROCEDURE — 99213 OFFICE O/P EST LOW 20 MIN: CPT

## 2021-11-10 NOTE — REVIEW OF SYSTEMS
[Joint Stiffness] : joint stiffness [Skin Wound] : skin wound [Fever] : no fever [Chills] : no chills [Red Eyes] : eyes not red [Loss Of Hearing] : no hearing loss [Shortness Of Breath] : no shortness of breath [Wheezing] : no wheezing [Abdominal Pain] : no abdominal pain [Anxiety] : no anxiety [Easy Bleeding] : no tendency for easy bleeding [FreeTextEntry5] : HTN , HLD [de-identified] : s/p distal amp hallux left foot , healed well , no soi. new left 2nd distal ulcer down to skin, subcutaneous tissue, fat , now probes to bone with distal gangrenous changes . [de-identified] : diabetic neuropathy [de-identified] : type 2 diabetes , NIDDM

## 2021-11-10 NOTE — PHYSICAL EXAM
[2+] : left 2+ [Alert] : alert [Oriented to Person] : oriented to person [Oriented to Place] : oriented to place [Calm] : calm [4 x 4] : 4 x 4  [Ankle Swelling (On Exam)] : not present [Varicose Veins Of Lower Extremities] : not present [] : not present [Skin Ulcer] : no ulcer [de-identified] : A&Ox3, NAD [de-identified] : HTN, HLD  [de-identified] : 5/5 strength in all quadrants bilaterally [de-identified] : left hallux distal amp healed, no soi. left 2nd digit distal ulceration down to skin, subcutaneous tissue, and fat and probes to bone with distal gangrenous changes . [de-identified] : loss of protective sensation bilaterally , NIDDM with neuropathy  [de-identified] : Intact [de-identified] : dry dressing [FreeTextEntry7] : Left Foot 2nd Distal Digit [FreeTextEntry8] : 0.5 [FreeTextEntry9] : 0.4 [de-identified] : 0.1 [de-identified] : scant serous/sanguineous [de-identified] : dry black eschar [de-identified] : 20% [de-identified] : 80% [de-identified] : Silver Alginate [de-identified] : Cleansed with Normal Saline\par  [de-identified] : Left Foot 3rd digit- No open wound [de-identified] : ecchymosis  [de-identified] : none [de-identified] : Cleansed with NS\par Toe sock\par \par  [FreeTextEntry1] : Left Foot 4th digit [FreeTextEntry2] : 0.1 [FreeTextEntry3] : 0.1 [FreeTextEntry4] : 0.1 [de-identified] : none [de-identified] : Cleansed with NS\par Toe sock [TWNoteComboBox4] : None [TWNoteComboBox6] : Surgical [de-identified] : No [de-identified] : None [de-identified] : None [de-identified] : 3x Weekly [de-identified] : Primary Dressing [de-identified] : None [de-identified] : No [de-identified] : No [de-identified] : None [de-identified] : None [de-identified] : Yes [de-identified] : 3x Weekly [de-identified] : Primary Dressing [de-identified] : None [de-identified] : No [de-identified] : No [de-identified] : None [de-identified] : None [de-identified] : None [de-identified] : No [TWNoteComboBox5] : No [de-identified] : No [de-identified] : Normal [de-identified] : None [de-identified] : None [de-identified] : 100% [de-identified] : No

## 2021-11-10 NOTE — ASSESSMENT
[Verbal] : Verbal [Patient] : Patient [Good - alert, interested, motivated] : Good - alert, interested, motivated [Verbalizes knowledge/Understanding] : Verbalizes knowledge/understanding [Dressing changes] : dressing changes [Foot Care] : foot care [Skin Care] : skin care [Pressure relief] : pressure relief [Signs and symptoms of infection] : sign and symptoms of infection [Nutrition] : nutrition [How and When to Call] : how and when to call [Off-loading] : off-loading [Patient responsibility to plan of care] : patient responsibility to plan of care [Glycemic Control] : glycemic control [Stable] : stable [Home] : Home [Ambulatory] : Ambulatory [Not Applicable - Long Term Care/Home Health Agency] : Long Term Care/Home Health Agency: Not Applicable [] : No [FreeTextEntry2] : Restore Skin Integrity\par Infection Control\par Localized wound care\par Maintain acceptable pain levels at satisfactory relief.\par Demonstrates use of both nonpharmacological and pharmacological pain relief strategies [FreeTextEntry3] : Left Foot 2nd digit wound has become necrotic [FreeTextEntry4] : Preauth submitted for Left foot 2nd digit distal amp to be done on 11/12/21 if approved, if not then 11/16/21 (Assessment).\par 6/30 HBOT completed, Follow up for daily tx. 3rd digit on left foot  is to be monitored for changes as per DPM\par Rx for PO Alprazolam 0.5 mg sent to pharmacy

## 2021-11-10 NOTE — HISTORY OF PRESENT ILLNESS
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

## 2021-11-10 NOTE — PLAN
[FreeTextEntry1] : Hallux amp is healed , patient now needs distal amp of the 2nd toe left foot , clinical OM and irreversible gangrenous changes . All risks , benefits discussed all questions answered  , patient agrees with the plan . Patient placed on Cipro 500 , surgery on Friday Spent 20 minutes for patient care and medical decision making.\par

## 2021-11-11 ENCOUNTER — OUTPATIENT (OUTPATIENT)
Dept: OUTPATIENT SERVICES | Facility: HOSPITAL | Age: 76
LOS: 1 days | Discharge: HOME | End: 2021-11-11
Payer: MEDICARE

## 2021-11-11 ENCOUNTER — APPOINTMENT (OUTPATIENT)
Dept: HYPERBARIC MEDICINE | Facility: HOSPITAL | Age: 76
End: 2021-11-11
Payer: MEDICARE

## 2021-11-11 VITALS
RESPIRATION RATE: 20 BRPM | HEART RATE: 81 BPM | SYSTOLIC BLOOD PRESSURE: 122 MMHG | OXYGEN SATURATION: 100 % | TEMPERATURE: 97.5 F | DIASTOLIC BLOOD PRESSURE: 72 MMHG

## 2021-11-11 VITALS
OXYGEN SATURATION: 100 % | SYSTOLIC BLOOD PRESSURE: 129 MMHG | RESPIRATION RATE: 18 BRPM | DIASTOLIC BLOOD PRESSURE: 76 MMHG | TEMPERATURE: 97.6 F | HEART RATE: 71 BPM

## 2021-11-11 DIAGNOSIS — Z90.89 ACQUIRED ABSENCE OF OTHER ORGANS: Chronic | ICD-10-CM
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
## 2021-11-11 DIAGNOSIS — E11.52 TYPE 2 DIABETES MELLITUS WITH DIABETIC PERIPHERAL ANGIOPATHY WITH GANGRENE: ICD-10-CM

## 2021-11-11 DIAGNOSIS — Z89.412 ACQUIRED ABSENCE OF LEFT GREAT TOE: ICD-10-CM

## 2021-11-11 DIAGNOSIS — Z79.899 OTHER LONG TERM (CURRENT) DRUG THERAPY: ICD-10-CM

## 2021-11-11 DIAGNOSIS — E11.40 TYPE 2 DIABETES MELLITUS WITH DIABETIC NEUROPATHY, UNSPECIFIED: ICD-10-CM

## 2021-11-11 DIAGNOSIS — E11.621 TYPE 2 DIABETES MELLITUS WITH FOOT ULCER: ICD-10-CM

## 2021-11-11 DIAGNOSIS — N28.9 DISORDER OF KIDNEY AND URETER, UNSPECIFIED: ICD-10-CM

## 2021-11-11 DIAGNOSIS — I10 ESSENTIAL (PRIMARY) HYPERTENSION: ICD-10-CM

## 2021-11-11 DIAGNOSIS — Z79.82 LONG TERM (CURRENT) USE OF ASPIRIN: ICD-10-CM

## 2021-11-11 DIAGNOSIS — E88.81 METABOLIC SYNDROME AND OTHER INSULIN RESISTANCE: ICD-10-CM

## 2021-11-11 DIAGNOSIS — Z87.891 PERSONAL HISTORY OF NICOTINE DEPENDENCE: ICD-10-CM

## 2021-11-11 DIAGNOSIS — L97.422 NON-PRESSURE CHRONIC ULCER OF LEFT HEEL AND MIDFOOT WITH FAT LAYER EXPOSED: ICD-10-CM

## 2021-11-11 DIAGNOSIS — E78.2 MIXED HYPERLIPIDEMIA: ICD-10-CM

## 2021-11-11 DIAGNOSIS — T86.828 OTHER COMPLICATIONS OF SKIN GRAFT (ALLOGRAFT) (AUTOGRAFT): ICD-10-CM

## 2021-11-11 DIAGNOSIS — L97.514 NON-PRESSURE CHRONIC ULCER OF OTHER PART OF RIGHT FOOT WITH NECROSIS OF BONE: ICD-10-CM

## 2021-11-11 DIAGNOSIS — Z98.890 OTHER SPECIFIED POSTPROCEDURAL STATES: ICD-10-CM

## 2021-11-11 DIAGNOSIS — Z79.84 LONG TERM (CURRENT) USE OF ORAL HYPOGLYCEMIC DRUGS: ICD-10-CM

## 2021-11-11 PROCEDURE — 82962 GLUCOSE BLOOD TEST: CPT

## 2021-11-11 PROCEDURE — G0277: CPT

## 2021-11-11 PROCEDURE — 99183 HYPERBARIC OXYGEN THERAPY: CPT

## 2021-11-11 RX ORDER — DOXYCYCLINE HYCLATE 100 MG/1
100 CAPSULE ORAL
Qty: 30 | Refills: 2 | Status: COMPLETED | COMMUNITY
Start: 2021-11-11 | End: 2021-12-26

## 2021-11-11 NOTE — ADDENDUM
[FreeTextEntry1] : PT ARRIVED A&OX3 \par ALL VITALS WITHIN PARAMETERS FOR HBOT\par PT RECEIVED WOUND CARE PRE HBOT \par PT DESCENDED TO 2.0 MABEL @ 1.75 PSI/MIN IN CHAMBER #2 WITHOUT INCIDENT\par PT RESTING AT TX DEPTH WITH VISIBLE CHEST RISE AND FALL OBSERVED CHAMBER SIDE\par PT ASCENDED FROM 2.0 MABEL @ 1.75 PSI/MIN WITHOUT INCIDENT\par PT TOLERATED TX WELL\par

## 2021-11-11 NOTE — PROCEDURE
[Outpatient] : Outpatient [Ambulatory] : Patient is ambulatory. [THIS CHAMBER HAS BEEN CLEANED / DISINFECTED] : This chamber has been cleaned / disinfected according to local and hospital policy and procedure prior to this treatment. [Patient demonstrated and verbalized proper technique for using air break mask] : Patient demonstrated and verbalized proper technique for using air break mask [Patient educated on the risks of SMOKING prior to HBOT with understanding] : Patient educated on the risks of SMOKING prior to HBOT with understanding [Patient educated on the risks of CONSUMING ALCOHOL prior to HBOT with understanding] : Patient educated on the risks of CONSUMING ALCOHOL prior to HBOT with understanding [100% Cotton] : 100% cotton [Empty all pockets] : empty all pockets [No hair oils, wigs, hairpieces, pins] : no hair oils, wigs, hairpieces, pins  [Pre tx medications] : pre tx medications  [No make-up, creams] : no make-up, creams  [No jewelry] : no jewelry  [No matches, cigarettes, lighters] : no matches, cigarettes, lighters  [Hearing aid removed] : hearing aid removed [Dentures removed] : dentures removed [Ground bracelet on pt's wrist] : ground bracelet on pt's wrist  [Contacts removed] : contacts removed  [Remove nail polish] : remove nail polish  [No reading material] : no reading material  [Bra, undergarments removed] : bra, undergarments removed  [No contraindicated dressings] : no contraindicated dressings [Ground Wire - VISUAL Verification - Intact/Free of Obstruction] : Ground Wire - VISUAL Verification - Intact/Free of Obstruction  [Ground Continuity - Verified < 1 ohm w/ Wrist Strap Lamin] : Ground Continuity - Verified < 1 ohm w/ Wrist Strap Lamin [Number: ___] : Number: [unfilled] [Diagnosis: ___] : Diagnosis: [unfilled] [____] : Post-Dive: Time - [unfilled] [___] : Post-Dive: Value - [unfilled] mg/dL [Clear all fields] : clear all fields [] : No [FreeTextEntry3] : 90 [FreeTextEntry5] : 8668 [FreeTextEntry7] : 3785 [FreeTextEntry9] : 1002 [de-identified] : 1018 [de-identified] : 108 MINUTES

## 2021-11-12 ENCOUNTER — OUTPATIENT (OUTPATIENT)
Dept: OUTPATIENT SERVICES | Facility: HOSPITAL | Age: 76
LOS: 1 days | Discharge: HOME | End: 2021-11-12
Payer: MEDICARE

## 2021-11-12 ENCOUNTER — APPOINTMENT (OUTPATIENT)
Dept: HYPERBARIC MEDICINE | Facility: HOSPITAL | Age: 76
End: 2021-11-12
Payer: MEDICARE

## 2021-11-12 VITALS
RESPIRATION RATE: 20 BRPM | SYSTOLIC BLOOD PRESSURE: 134 MMHG | HEART RATE: 86 BPM | DIASTOLIC BLOOD PRESSURE: 79 MMHG | TEMPERATURE: 97.4 F | OXYGEN SATURATION: 99 %

## 2021-11-12 VITALS
OXYGEN SATURATION: 100 % | SYSTOLIC BLOOD PRESSURE: 133 MMHG | RESPIRATION RATE: 18 BRPM | TEMPERATURE: 97.2 F | HEART RATE: 73 BPM | DIASTOLIC BLOOD PRESSURE: 78 MMHG

## 2021-11-12 DIAGNOSIS — L97.422 NON-PRESSURE CHRONIC ULCER OF LEFT HEEL AND MIDFOOT WITH FAT LAYER EXPOSED: ICD-10-CM

## 2021-11-12 DIAGNOSIS — E11.621 TYPE 2 DIABETES MELLITUS WITH FOOT ULCER: ICD-10-CM

## 2021-11-12 DIAGNOSIS — T86.828 OTHER COMPLICATIONS OF SKIN GRAFT (ALLOGRAFT) (AUTOGRAFT): ICD-10-CM

## 2021-11-12 DIAGNOSIS — Z90.89 ACQUIRED ABSENCE OF OTHER ORGANS: Chronic | ICD-10-CM
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
## 2021-11-12 DIAGNOSIS — L97.514 NON-PRESSURE CHRONIC ULCER OF OTHER PART OF RIGHT FOOT WITH NECROSIS OF BONE: ICD-10-CM

## 2021-11-12 PROCEDURE — 99183 HYPERBARIC OXYGEN THERAPY: CPT

## 2021-11-12 PROCEDURE — G0277: CPT

## 2021-11-12 PROCEDURE — 82962 GLUCOSE BLOOD TEST: CPT

## 2021-11-12 NOTE — ADDENDUM
[FreeTextEntry1] : PT ARRIVED AMBULATORY A&OX3.\par ALL VITALS WITHIN PARAMETERS FOR HBOT.\par NO DRAINAGE NOTED ON DRESSING PRIOR TO DESCENT.\par PT DESCENT TO RX TX DEPTH IN CHAMBER 2 WAS WITHOUT INCIDENT.\par PT RESTING AT DEPTH, CHEST RISE AND FALL OBSERVED.\par DRESSING CHANGED ON 11/11/21.\par PT ASCENDED FROM 2.0 MABEL @ 1.75 PSI/MIN WITHOUT INCIDENT\par PT TOLERATED TX WELL

## 2021-11-12 NOTE — PROCEDURE
[Outpatient] : Outpatient [Ambulatory] : Patient is ambulatory. [THIS CHAMBER HAS BEEN CLEANED / DISINFECTED] : This chamber has been cleaned / disinfected according to local and hospital policy and procedure prior to this treatment. [Patient demonstrated and verbalized proper technique for using air break mask] : Patient demonstrated and verbalized proper technique for using air break mask [Patient educated on the risks of SMOKING prior to HBOT with understanding] : Patient educated on the risks of SMOKING prior to HBOT with understanding [Patient educated on the risks of CONSUMING ALCOHOL prior to HBOT with understanding] : Patient educated on the risks of CONSUMING ALCOHOL prior to HBOT with understanding [100% Cotton] : 100% cotton [Empty all pockets] : empty all pockets [No hair oils, wigs, hairpieces, pins] : no hair oils, wigs, hairpieces, pins  [Pre tx medications] : pre tx medications  [No make-up, creams] : no make-up, creams  [No jewelry] : no jewelry  [No matches, cigarettes, lighters] : no matches, cigarettes, lighters  [Hearing aid removed] : hearing aid removed [Dentures removed] : dentures removed [Ground bracelet on pt's wrist] : ground bracelet on pt's wrist  [Contacts removed] : contacts removed  [Remove nail polish] : remove nail polish  [No reading material] : no reading material  [Bra, undergarments removed] : bra, undergarments removed  [No contraindicated dressings] : no contraindicated dressings [Ground Wire - VISUAL Verification - Intact/Free of Obstruction] : Ground Wire - VISUAL Verification - Intact/Free of Obstruction  [Ground Continuity - Verified < 1 ohm w/ Wrist Strap Lamin] : Ground Continuity - Verified < 1 ohm w/ Wrist Strap Lamin [Number: ___] : Number: [unfilled] [Diagnosis: ___] : Diagnosis: [unfilled] [____] : Post-Dive: Time - [unfilled] [___] : Post-Dive: Value - [unfilled] mg/dL [Clear all fields] : clear all fields [] : No [FreeTextEntry3] : 90 [FreeTextEntry5] : 8:02 [FreeTextEntry7] : 8:11 [FreeTextEntry9] : 8220 [de-identified] : 0982 [de-identified] : 108 MINUTES

## 2021-11-15 ENCOUNTER — OUTPATIENT (OUTPATIENT)
Dept: OUTPATIENT SERVICES | Facility: HOSPITAL | Age: 76
LOS: 1 days | Discharge: HOME | End: 2021-11-15
Payer: MEDICARE

## 2021-11-15 ENCOUNTER — APPOINTMENT (OUTPATIENT)
Dept: HYPERBARIC MEDICINE | Facility: HOSPITAL | Age: 76
End: 2021-11-15
Payer: MEDICARE

## 2021-11-15 ENCOUNTER — NON-APPOINTMENT (OUTPATIENT)
Age: 76
End: 2021-11-15

## 2021-11-15 VITALS
OXYGEN SATURATION: 98 % | DIASTOLIC BLOOD PRESSURE: 61 MMHG | TEMPERATURE: 97.3 F | SYSTOLIC BLOOD PRESSURE: 102 MMHG | RESPIRATION RATE: 20 BRPM | HEART RATE: 79 BPM

## 2021-11-15 VITALS
TEMPERATURE: 97.2 F | SYSTOLIC BLOOD PRESSURE: 116 MMHG | OXYGEN SATURATION: 100 % | HEART RATE: 65 BPM | RESPIRATION RATE: 18 BRPM | DIASTOLIC BLOOD PRESSURE: 68 MMHG

## 2021-11-15 DIAGNOSIS — Z90.89 ACQUIRED ABSENCE OF OTHER ORGANS: Chronic | ICD-10-CM

## 2021-11-15 DIAGNOSIS — E11.621 TYPE 2 DIABETES MELLITUS WITH FOOT ULCER: ICD-10-CM

## 2021-11-15 PROCEDURE — U0005: CPT

## 2021-11-15 PROCEDURE — U0003: CPT

## 2021-11-15 PROCEDURE — 82962 GLUCOSE BLOOD TEST: CPT

## 2021-11-15 PROCEDURE — G0277: CPT

## 2021-11-15 PROCEDURE — 99183 HYPERBARIC OXYGEN THERAPY: CPT

## 2021-11-15 NOTE — PROCEDURE
[Outpatient] : Outpatient [Ambulatory] : Patient is ambulatory. [THIS CHAMBER HAS BEEN CLEANED / DISINFECTED] : This chamber has been cleaned / disinfected according to local and hospital policy and procedure prior to this treatment. [Patient demonstrated and verbalized proper technique for using air break mask] : Patient demonstrated and verbalized proper technique for using air break mask [Patient educated on the risks of SMOKING prior to HBOT with understanding] : Patient educated on the risks of SMOKING prior to HBOT with understanding [Patient educated on the risks of CONSUMING ALCOHOL prior to HBOT with understanding] : Patient educated on the risks of CONSUMING ALCOHOL prior to HBOT with understanding [100% Cotton] : 100% cotton [Empty all pockets] : empty all pockets [No hair oils, wigs, hairpieces, pins] : no hair oils, wigs, hairpieces, pins  [Pre tx medications] : pre tx medications  [No make-up, creams] : no make-up, creams  [No jewelry] : no jewelry  [No matches, cigarettes, lighters] : no matches, cigarettes, lighters  [Hearing aid removed] : hearing aid removed [Dentures removed] : dentures removed [Ground bracelet on pt's wrist] : ground bracelet on pt's wrist  [Contacts removed] : contacts removed  [Remove nail polish] : remove nail polish  [No reading material] : no reading material  [Bra, undergarments removed] : bra, undergarments removed  [No contraindicated dressings] : no contraindicated dressings [Ground Wire - VISUAL Verification - Intact/Free of Obstruction] : Ground Wire - VISUAL Verification - Intact/Free of Obstruction  [Ground Continuity - Verified < 1 ohm w/ Wrist Strap Lamin] : Ground Continuity - Verified < 1 ohm w/ Wrist Strap Lamin [Number: ___] : Number: [unfilled] [Diagnosis: ___] : Diagnosis: [unfilled] [____] : Post-Dive: Time - [unfilled] [___] : Post-Dive: Value - [unfilled] mg/dL [Clear all fields] : clear all fields [] : No [FreeTextEntry3] : 90 [FreeTextEntry5] : 8:05 [FreeTextEntry7] : 8:14 [FreeTextEntry9] : 9:44 [de-identified] : 0917 [de-identified] : 108 MINUTES

## 2021-11-15 NOTE — ADDENDUM
[FreeTextEntry1] : PT ARRIVED AMBULATORY FROM [facility] A&OX3\par ALL VITALS WITHIN PARAMETERS FOR HBOT.\par WOUND CARE TO FOLLOW HBOT. COVID SWAB TO BE BE OBTAINED POST HBOT.\par PT DESCENT TO RX TX DEPTH IN CHAMBER 2 WAS WITHOUT INCIDENT.\par PT RESTING AT DEPTH, CHEST RISE AND FALL OBSERVED.\par TRANSFER OF CARE TO TECHNICIAN AT 8:33.\par PT ASCENDED FROM 2.0 MABEL @ 1.75 PSI/MIN WITHOUT INCIDENT\par PT TOLERATED TX WELL\par PT TO RECEIVE WOUND CARE POST HBOT\par PT TO RECEIVE ROUTINE COVID SWAB POST HBOT

## 2021-11-16 ENCOUNTER — APPOINTMENT (OUTPATIENT)
Dept: WOUND CARE | Facility: HOSPITAL | Age: 76
End: 2021-11-16
Payer: MEDICARE

## 2021-11-16 ENCOUNTER — RESULT REVIEW (OUTPATIENT)
Age: 76
End: 2021-11-16

## 2021-11-16 ENCOUNTER — OUTPATIENT (OUTPATIENT)
Dept: OUTPATIENT SERVICES | Facility: HOSPITAL | Age: 76
LOS: 1 days | Discharge: HOME | End: 2021-11-16
Payer: MEDICARE

## 2021-11-16 VITALS
HEIGHT: 72 IN | OXYGEN SATURATION: 99 % | HEART RATE: 75 BPM | DIASTOLIC BLOOD PRESSURE: 71 MMHG | BODY MASS INDEX: 31.15 KG/M2 | RESPIRATION RATE: 18 BRPM | SYSTOLIC BLOOD PRESSURE: 125 MMHG | TEMPERATURE: 97 F | WEIGHT: 230 LBS

## 2021-11-16 DIAGNOSIS — L97.422 NON-PRESSURE CHRONIC ULCER OF LEFT HEEL AND MIDFOOT WITH FAT LAYER EXPOSED: ICD-10-CM

## 2021-11-16 DIAGNOSIS — Z90.89 ACQUIRED ABSENCE OF OTHER ORGANS: Chronic | ICD-10-CM
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
## 2021-11-16 DIAGNOSIS — E11.621 TYPE 2 DIABETES MELLITUS WITH FOOT ULCER: ICD-10-CM

## 2021-11-16 DIAGNOSIS — Z20.822 CONTACT WITH AND (SUSPECTED) EXPOSURE TO COVID-19: ICD-10-CM

## 2021-11-16 DIAGNOSIS — T86.828 OTHER COMPLICATIONS OF SKIN GRAFT (ALLOGRAFT) (AUTOGRAFT): ICD-10-CM

## 2021-11-16 DIAGNOSIS — L97.514 NON-PRESSURE CHRONIC ULCER OF OTHER PART OF RIGHT FOOT WITH NECROSIS OF BONE: ICD-10-CM

## 2021-11-16 PROCEDURE — 28825 PARTIAL AMPUTATION OF TOE: CPT | Mod: T1

## 2021-11-16 PROCEDURE — 88304 TISSUE EXAM BY PATHOLOGIST: CPT

## 2021-11-16 PROCEDURE — 88311 DECALCIFY TISSUE: CPT

## 2021-11-16 PROCEDURE — 88304 TISSUE EXAM BY PATHOLOGIST: CPT | Mod: 26

## 2021-11-16 PROCEDURE — 88311 DECALCIFY TISSUE: CPT | Mod: 26

## 2021-11-16 RX ORDER — CIPROFLOXACIN HYDROCHLORIDE 500 MG/1
500 TABLET, FILM COATED ORAL
Qty: 30 | Refills: 2 | Status: DISCONTINUED | COMMUNITY
Start: 2021-11-10 | End: 2021-11-16

## 2021-11-17 ENCOUNTER — NON-APPOINTMENT (OUTPATIENT)
Age: 76
End: 2021-11-17

## 2021-11-17 ENCOUNTER — OUTPATIENT (OUTPATIENT)
Dept: OUTPATIENT SERVICES | Facility: HOSPITAL | Age: 76
LOS: 1 days | Discharge: HOME | End: 2021-11-17
Payer: MEDICARE

## 2021-11-17 ENCOUNTER — APPOINTMENT (OUTPATIENT)
Dept: HYPERBARIC MEDICINE | Facility: HOSPITAL | Age: 76
End: 2021-11-17

## 2021-11-17 VITALS
TEMPERATURE: 97.9 F | HEIGHT: 72 IN | DIASTOLIC BLOOD PRESSURE: 78 MMHG | RESPIRATION RATE: 18 BRPM | WEIGHT: 230 LBS | HEART RATE: 93 BPM | BODY MASS INDEX: 31.15 KG/M2 | SYSTOLIC BLOOD PRESSURE: 126 MMHG | OXYGEN SATURATION: 99 %

## 2021-11-17 DIAGNOSIS — Z90.89 ACQUIRED ABSENCE OF OTHER ORGANS: Chronic | ICD-10-CM

## 2021-11-17 DIAGNOSIS — E11.621 TYPE 2 DIABETES MELLITUS WITH FOOT ULCER: ICD-10-CM

## 2021-11-17 PROCEDURE — G0463: CPT

## 2021-11-17 NOTE — REASON FOR VISIT
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

## 2021-11-17 NOTE — PROCEDURE
[___ ml of 2% Xylocaine] : [unfilled] ml of 2% Xylocaine [Left foot, 2nd digit] : second digit of the left foot [Coapted with _____ nylon sutures] :  Once this was accomplished, it was then coapted with [unfilled] nylon sutures in a simple interrupted fashion. [# of sutures used: _____] : Approximately [unfilled] sutures were used in total  [Total incision length___cm] : and the total incision length was approximately [unfilled] cm.   The area was then cleaned with sterile saline.  Both the dorsal and the plantar flap were found to be viable and the patient was dressed in a dry sterile dressing.   [] : No [FreeTextEntry9] : 103 [de-identified] : DFU 3 distal left 2nd toe with OM  [de-identified] : Brittney [FreeTextEntry6] : DFU 3  left  2nd toe  [FreeTextEntry7] : same [de-identified] : 10 cc marcaine [de-identified] : 5cc  [de-identified] : Distal phalanx , skin , nail and subcutaneous tissue

## 2021-11-17 NOTE — PROCEDURE
[___ ml of 2% Xylocaine] : [unfilled] ml of 2% Xylocaine [Left foot, 2nd digit] : second digit of the left foot [Coapted with _____ nylon sutures] :  Once this was accomplished, it was then coapted with [unfilled] nylon sutures in a simple interrupted fashion. [# of sutures used: _____] : Approximately [unfilled] sutures were used in total  [Total incision length___cm] : and the total incision length was approximately [unfilled] cm.   The area was then cleaned with sterile saline.  Both the dorsal and the plantar flap were found to be viable and the patient was dressed in a dry sterile dressing.   [] : No [FreeTextEntry9] : 1036 [de-identified] : DFU 3 distal left 2nd toe with OM  [de-identified] : Brittney [FreeTextEntry6] : DFU 3  left  2nd toe  [FreeTextEntry7] : same [de-identified] : 10 cc marcaine [de-identified] : 5cc  [de-identified] : Distal phalanx , skin , nail and subcutaneous tissue

## 2021-11-17 NOTE — PHYSICAL EXAM
[2+] : left 2+ [Alert] : alert [Oriented to Person] : oriented to person [Oriented to Place] : oriented to place [Calm] : calm [FreeTextEntry7] : Left Foot 2nd Distal Digit [FreeTextEntry8] : 0.3 [FreeTextEntry9] : 0.3 [de-identified] : 0.1 [de-identified] : scant serous/sanguineous [de-identified] : dry black eschar [de-identified] : Silver Alginate [de-identified] : Cleansed with Normal Saline\par  [de-identified] : Left Foot 3rd digit- No open wound [de-identified] : ecchymosis  [de-identified] : No Treatment  [de-identified] : Cleansed with Normal Saline. \par Toe sock\par \par  [TWNoteComboBox4] : None [TWNoteComboBox6] : False [de-identified] : False [de-identified] : False [de-identified] : False [de-identified] : False [de-identified] : False [de-identified] : No [de-identified] : No [de-identified] : None [de-identified] : 100% [de-identified] : False [de-identified] : 3x Weekly [de-identified] : False [de-identified] : None [de-identified] : False [de-identified] : False [de-identified] : other [de-identified] : False [de-identified] : False [de-identified] : False [de-identified] : False [TWNoteComboBox5] : No [de-identified] : No [de-identified] : Normal [de-identified] : None [de-identified] : None [de-identified] : 100% [de-identified] : No [4 x 4] : 4 x 4  [Ankle Swelling (On Exam)] : not present [Varicose Veins Of Lower Extremities] : not present [] : not present [Skin Ulcer] : no ulcer [de-identified] : A&Ox3, NAD [de-identified] : HTN, HLD  [de-identified] : 5/5 strength in all quadrants bilaterally [de-identified] :  left 2nd digit distal ulceration down to skin, subcutaneous tissue, and fat and probes to bone with distal gangrenous changes . [de-identified] : loss of protective sensation bilaterally , NIDDM with neuropathy  [de-identified] : DPM performed distal amputation.  Patient tolerated procedure well.  DPM applied dressing. [FreeTextEntry1] : Left foot 2nd distal digit [FreeTextEntry2] : 1.3 [FreeTextEntry3] : 1.3 [FreeTextEntry4] : black eschar [de-identified] : none [de-identified] : intact / swelling [de-identified] : none [de-identified] : 0.5% Marcaine [de-identified] : distal amputation [de-identified] : loose ace [de-identified] : Xeroform [de-identified] : NSC

## 2021-11-17 NOTE — ASSESSMENT
[Pressure relief] : pressure relief [Nutrition] : nutrition [Off-loading] : off-loading [Verbal] : Verbal [Patient] : Patient [Good - alert, interested, motivated] : Good - alert, interested, motivated [Verbalizes knowledge/Understanding] : Verbalizes knowledge/understanding [Dressing changes] : dressing changes [Foot Care] : foot care [Skin Care] : skin care [Signs and symptoms of infection] : sign and symptoms of infection [How and When to Call] : how and when to call [Labs and Tests] : labs and tests [Hyperbaric Therapy] : hyperbaric therapy [Patient responsibility to plan of care] : patient responsibility to plan of care [Glycemic Control] : glycemic control [Stable] : stable [Home] : Home [Ambulatory] : Ambulatory [Not Applicable - Long Term Care/Home Health Agency] : Long Term Care/Home Health Agency: Not Applicable [] : No [FreeTextEntry2] : Promote optimal skin integrity, offloading, infection prevention, HBO tx\par  [FreeTextEntry3] : No change in wound status. [FreeTextEntry4] : Left 2nd toe distal phalanx and Left 2nd toe middle phalanx pathology done.\par Oral antibiotic in progress\par F/U tomorrow for HBO tx and a dressing change

## 2021-11-17 NOTE — PHYSICAL EXAM
[2+] : left 2+ [Alert] : alert [Oriented to Person] : oriented to person [Oriented to Place] : oriented to place [Calm] : calm [FreeTextEntry7] : Left Foot 2nd Distal Digit [FreeTextEntry8] : 0.3 [FreeTextEntry9] : 0.3 [de-identified] : 0.1 [de-identified] : scant serous/sanguineous [de-identified] : dry black eschar [de-identified] : Silver Alginate [de-identified] : Cleansed with Normal Saline\par  [de-identified] : Left Foot 3rd digit- No open wound [de-identified] : ecchymosis  [de-identified] : No Treatment  [de-identified] : Cleansed with Normal Saline. \par Toe sock\par \par  [TWNoteComboBox4] : None [TWNoteComboBox6] : False [de-identified] : False [de-identified] : False [de-identified] : False [de-identified] : False [de-identified] : False [de-identified] : No [de-identified] : No [de-identified] : None [de-identified] : 100% [de-identified] : False [de-identified] : 3x Weekly [de-identified] : False [de-identified] : None [de-identified] : False [de-identified] : False [de-identified] : other [de-identified] : False [de-identified] : False [de-identified] : False [de-identified] : False [TWNoteComboBox5] : No [de-identified] : No [de-identified] : Normal [de-identified] : None [de-identified] : None [de-identified] : 100% [de-identified] : No [4 x 4] : 4 x 4  [Ankle Swelling (On Exam)] : not present [Varicose Veins Of Lower Extremities] : not present [] : not present [Skin Ulcer] : no ulcer [de-identified] : A&Ox3, NAD [de-identified] : HTN, HLD  [de-identified] :  left 2nd digit distal ulceration down to skin, subcutaneous tissue, and fat and probes to bone with distal gangrenous changes . [de-identified] : 5/5 strength in all quadrants bilaterally [de-identified] : loss of protective sensation bilaterally , NIDDM with neuropathy  [FreeTextEntry1] : Left foot 2nd distal digit [de-identified] : DPM performed distal amputation.  Patient tolerated procedure well.  DPM applied dressing. [FreeTextEntry2] : 1.3 [FreeTextEntry3] : 1.3 [FreeTextEntry4] : black eschar [de-identified] : none [de-identified] : intact / swelling [de-identified] : none [de-identified] : 0.5% Marcaine [de-identified] : distal amputation [de-identified] : loose ace [de-identified] : Xeroform [de-identified] : NSC

## 2021-11-18 ENCOUNTER — OUTPATIENT (OUTPATIENT)
Dept: OUTPATIENT SERVICES | Facility: HOSPITAL | Age: 76
LOS: 1 days | Discharge: HOME | End: 2021-11-18
Payer: MEDICARE

## 2021-11-18 ENCOUNTER — APPOINTMENT (OUTPATIENT)
Dept: HYPERBARIC MEDICINE | Facility: HOSPITAL | Age: 76
End: 2021-11-18
Payer: MEDICARE

## 2021-11-18 VITALS
SYSTOLIC BLOOD PRESSURE: 104 MMHG | HEART RATE: 81 BPM | RESPIRATION RATE: 18 BRPM | TEMPERATURE: 96.9 F | OXYGEN SATURATION: 97 % | DIASTOLIC BLOOD PRESSURE: 76 MMHG

## 2021-11-18 VITALS
TEMPERATURE: 97.2 F | DIASTOLIC BLOOD PRESSURE: 67 MMHG | SYSTOLIC BLOOD PRESSURE: 112 MMHG | HEART RATE: 70 BPM | OXYGEN SATURATION: 100 % | RESPIRATION RATE: 18 BRPM

## 2021-11-18 DIAGNOSIS — L97.514 NON-PRESSURE CHRONIC ULCER OF OTHER PART OF RIGHT FOOT WITH NECROSIS OF BONE: ICD-10-CM

## 2021-11-18 DIAGNOSIS — T86.828 OTHER COMPLICATIONS OF SKIN GRAFT (ALLOGRAFT) (AUTOGRAFT): ICD-10-CM

## 2021-11-18 DIAGNOSIS — E11.621 TYPE 2 DIABETES MELLITUS WITH FOOT ULCER: ICD-10-CM

## 2021-11-18 DIAGNOSIS — L97.422 NON-PRESSURE CHRONIC ULCER OF LEFT HEEL AND MIDFOOT WITH FAT LAYER EXPOSED: ICD-10-CM

## 2021-11-18 DIAGNOSIS — Z90.89 ACQUIRED ABSENCE OF OTHER ORGANS: Chronic | ICD-10-CM
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
## 2021-11-18 LAB — SURGICAL PATHOLOGY STUDY: SIGNIFICANT CHANGE UP

## 2021-11-18 PROCEDURE — 99183 HYPERBARIC OXYGEN THERAPY: CPT

## 2021-11-18 PROCEDURE — 82962 GLUCOSE BLOOD TEST: CPT

## 2021-11-18 PROCEDURE — G0277: CPT

## 2021-11-19 ENCOUNTER — OUTPATIENT (OUTPATIENT)
Dept: OUTPATIENT SERVICES | Facility: HOSPITAL | Age: 76
LOS: 1 days | Discharge: HOME | End: 2021-11-19
Payer: MEDICARE

## 2021-11-19 ENCOUNTER — APPOINTMENT (OUTPATIENT)
Dept: HYPERBARIC MEDICINE | Facility: HOSPITAL | Age: 76
End: 2021-11-19
Payer: MEDICARE

## 2021-11-19 VITALS
DIASTOLIC BLOOD PRESSURE: 70 MMHG | TEMPERATURE: 96.9 F | RESPIRATION RATE: 20 BRPM | OXYGEN SATURATION: 100 % | HEART RATE: 79 BPM | SYSTOLIC BLOOD PRESSURE: 116 MMHG

## 2021-11-19 VITALS
DIASTOLIC BLOOD PRESSURE: 72 MMHG | TEMPERATURE: 97.1 F | HEART RATE: 70 BPM | RESPIRATION RATE: 20 BRPM | OXYGEN SATURATION: 99 % | SYSTOLIC BLOOD PRESSURE: 112 MMHG

## 2021-11-19 DIAGNOSIS — L97.514 NON-PRESSURE CHRONIC ULCER OF OTHER PART OF RIGHT FOOT WITH NECROSIS OF BONE: ICD-10-CM

## 2021-11-19 DIAGNOSIS — Z90.89 ACQUIRED ABSENCE OF OTHER ORGANS: Chronic | ICD-10-CM

## 2021-11-19 DIAGNOSIS — E11.621 TYPE 2 DIABETES MELLITUS WITH FOOT ULCER: ICD-10-CM

## 2021-11-19 PROCEDURE — G0277: CPT

## 2021-11-19 PROCEDURE — 99183 HYPERBARIC OXYGEN THERAPY: CPT

## 2021-11-19 PROCEDURE — 82962 GLUCOSE BLOOD TEST: CPT

## 2021-11-19 NOTE — PROCEDURE
[Outpatient] : Outpatient [Ambulatory] : Patient is ambulatory. [THIS CHAMBER HAS BEEN CLEANED / DISINFECTED] : This chamber has been cleaned / disinfected according to local and hospital policy and procedure prior to this treatment. [Patient demonstrated and verbalized proper technique for using air break mask] : Patient demonstrated and verbalized proper technique for using air break mask [Patient educated on the risks of SMOKING prior to HBOT with understanding] : Patient educated on the risks of SMOKING prior to HBOT with understanding [Patient educated on the risks of CONSUMING ALCOHOL prior to HBOT with understanding] : Patient educated on the risks of CONSUMING ALCOHOL prior to HBOT with understanding [100% Cotton] : 100% cotton [Empty all pockets] : empty all pockets [No hair oils, wigs, hairpieces, pins] : no hair oils, wigs, hairpieces, pins  [Pre tx medications] : pre tx medications  [No make-up, creams] : no make-up, creams  [No jewelry] : no jewelry  [No matches, cigarettes, lighters] : no matches, cigarettes, lighters  [Hearing aid removed] : hearing aid removed [Dentures removed] : dentures removed [Ground bracelet on pt's wrist] : ground bracelet on pt's wrist  [Contacts removed] : contacts removed  [Remove nail polish] : remove nail polish  [No reading material] : no reading material  [Bra, undergarments removed] : bra, undergarments removed  [No contraindicated dressings] : no contraindicated dressings [Ground Wire - VISUAL Verification - Intact/Free of Obstruction] : Ground Wire - VISUAL Verification - Intact/Free of Obstruction  [Ground Continuity - Verified < 1 ohm w/ Wrist Strap Lamin] : Ground Continuity - Verified < 1 ohm w/ Wrist Strap Lamin [Number: ___] : Number: [unfilled] [Diagnosis: ___] : Diagnosis: [unfilled] [____] : Post-Dive: Time - [unfilled] [___] : Post-Dive: Value - [unfilled] mg/dL [Clear all fields] : clear all fields [] : No [FreeTextEntry3] : 90 [FreeTextEntry5] : 8:10 [FreeTextEntry7] : 8:19 [FreeTextEntry9] : 9:49 [de-identified] : 9:58 [de-identified] : 108 min

## 2021-11-19 NOTE — ADDENDUM
[FreeTextEntry1] : PT ARRIVED AMBULATORY A&OX3.\par ALL VITALS WITHIN PARAMETERS FOR HBOT.\par PT DESCENT TO RX TX DEPTH IN CHAMBER 2 WAS WITHOUT INCIDENT.\par PT RESTING AT DEPTH, CHEST RISE AND FALL OBSERVED.\par PT ASCENT FROM TX DEPTH WAS WITHOUT INCIDENT. PT TOLERATED HBOT WELL.\par WOUND CARE PROVIDED PRIOR TO PT LEAVING CHAMBER ROOM.\par \par \par SUDHA RUFFIN 11/19/21

## 2021-11-20 DIAGNOSIS — E11.621 TYPE 2 DIABETES MELLITUS WITH FOOT ULCER: ICD-10-CM

## 2021-11-20 DIAGNOSIS — L97.514 NON-PRESSURE CHRONIC ULCER OF OTHER PART OF RIGHT FOOT WITH NECROSIS OF BONE: ICD-10-CM

## 2021-11-20 DIAGNOSIS — E88.81 METABOLIC SYNDROME AND OTHER INSULIN RESISTANCE: ICD-10-CM

## 2021-11-20 DIAGNOSIS — L97.422 NON-PRESSURE CHRONIC ULCER OF LEFT HEEL AND MIDFOOT WITH FAT LAYER EXPOSED: ICD-10-CM
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
## 2021-11-20 DIAGNOSIS — E11.52 TYPE 2 DIABETES MELLITUS WITH DIABETIC PERIPHERAL ANGIOPATHY WITH GANGRENE: ICD-10-CM

## 2021-11-20 DIAGNOSIS — M86.672 OTHER CHRONIC OSTEOMYELITIS, LEFT ANKLE AND FOOT: ICD-10-CM

## 2021-11-20 DIAGNOSIS — E11.69 TYPE 2 DIABETES MELLITUS WITH OTHER SPECIFIED COMPLICATION: ICD-10-CM

## 2021-11-20 DIAGNOSIS — T86.828 OTHER COMPLICATIONS OF SKIN GRAFT (ALLOGRAFT) (AUTOGRAFT): ICD-10-CM

## 2021-11-20 DIAGNOSIS — I10 ESSENTIAL (PRIMARY) HYPERTENSION: ICD-10-CM

## 2021-11-20 DIAGNOSIS — E11.40 TYPE 2 DIABETES MELLITUS WITH DIABETIC NEUROPATHY, UNSPECIFIED: ICD-10-CM

## 2021-11-20 DIAGNOSIS — Z79.84 LONG TERM (CURRENT) USE OF ORAL HYPOGLYCEMIC DRUGS: ICD-10-CM

## 2021-11-20 DIAGNOSIS — Z87.891 PERSONAL HISTORY OF NICOTINE DEPENDENCE: ICD-10-CM

## 2021-11-20 DIAGNOSIS — Z79.899 OTHER LONG TERM (CURRENT) DRUG THERAPY: ICD-10-CM

## 2021-11-20 DIAGNOSIS — Z79.82 LONG TERM (CURRENT) USE OF ASPIRIN: ICD-10-CM

## 2021-11-20 DIAGNOSIS — E78.2 MIXED HYPERLIPIDEMIA: ICD-10-CM

## 2021-11-20 DIAGNOSIS — L97.524 NON-PRESSURE CHRONIC ULCER OF OTHER PART OF LEFT FOOT WITH NECROSIS OF BONE: ICD-10-CM

## 2021-11-20 DIAGNOSIS — N28.9 DISORDER OF KIDNEY AND URETER, UNSPECIFIED: ICD-10-CM

## 2021-11-20 DIAGNOSIS — Z98.890 OTHER SPECIFIED POSTPROCEDURAL STATES: ICD-10-CM

## 2021-11-22 ENCOUNTER — APPOINTMENT (OUTPATIENT)
Dept: HYPERBARIC MEDICINE | Facility: HOSPITAL | Age: 76
End: 2021-11-22
Payer: MEDICARE

## 2021-11-22 ENCOUNTER — OUTPATIENT (OUTPATIENT)
Dept: OUTPATIENT SERVICES | Facility: HOSPITAL | Age: 76
LOS: 1 days | Discharge: HOME | End: 2021-11-22
Payer: MEDICARE

## 2021-11-22 VITALS
HEART RATE: 69 BPM | TEMPERATURE: 97.2 F | RESPIRATION RATE: 18 BRPM | DIASTOLIC BLOOD PRESSURE: 76 MMHG | SYSTOLIC BLOOD PRESSURE: 117 MMHG | OXYGEN SATURATION: 100 %

## 2021-11-22 VITALS
DIASTOLIC BLOOD PRESSURE: 78 MMHG | RESPIRATION RATE: 20 BRPM | HEART RATE: 81 BPM | OXYGEN SATURATION: 98 % | TEMPERATURE: 97.1 F | SYSTOLIC BLOOD PRESSURE: 132 MMHG

## 2021-11-22 DIAGNOSIS — E11.621 TYPE 2 DIABETES MELLITUS WITH FOOT ULCER: ICD-10-CM

## 2021-11-22 DIAGNOSIS — Z90.89 ACQUIRED ABSENCE OF OTHER ORGANS: Chronic | ICD-10-CM

## 2021-11-22 DIAGNOSIS — T86.828 OTHER COMPLICATIONS OF SKIN GRAFT (ALLOGRAFT) (AUTOGRAFT): ICD-10-CM

## 2021-11-22 DIAGNOSIS — L97.524 NON-PRESSURE CHRONIC ULCER OF OTHER PART OF LEFT FOOT WITH NECROSIS OF BONE: ICD-10-CM
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
## 2021-11-22 LAB — SARS-COV-2 RNA SPEC QL NAA+PROBE: SIGNIFICANT CHANGE UP

## 2021-11-22 PROCEDURE — 82962 GLUCOSE BLOOD TEST: CPT

## 2021-11-22 PROCEDURE — G0277: CPT

## 2021-11-22 PROCEDURE — U0005: CPT

## 2021-11-22 PROCEDURE — U0003: CPT

## 2021-11-22 PROCEDURE — 99183 HYPERBARIC OXYGEN THERAPY: CPT

## 2021-11-22 NOTE — PROCEDURE
[Outpatient] : Outpatient [Ambulatory] : Patient is ambulatory. [THIS CHAMBER HAS BEEN CLEANED / DISINFECTED] : This chamber has been cleaned / disinfected according to local and hospital policy and procedure prior to this treatment. [____] : Pre-Dive: Time - [unfilled] [___] : Pre-Dive: Value - [unfilled] mg/dL [Patient demonstrated and verbalized proper technique for using air break mask] : Patient demonstrated and verbalized proper technique for using air break mask [Patient educated on the risks of SMOKING prior to HBOT with understanding] : Patient educated on the risks of SMOKING prior to HBOT with understanding [Patient educated on the risks of CONSUMING ALCOHOL prior to HBOT with understanding] : Patient educated on the risks of CONSUMING ALCOHOL prior to HBOT with understanding [100% Cotton] : 100% cotton [Empty all pockets] : empty all pockets [No hair oils, wigs, hairpieces, pins] : no hair oils, wigs, hairpieces, pins  [Pre tx medications] : pre tx medications  [No make-up, creams] : no make-up, creams  [No jewelry] : no jewelry  [No matches, cigarettes, lighters] : no matches, cigarettes, lighters  [Hearing aid removed] : hearing aid removed [Dentures removed] : dentures removed [Ground bracelet on pt's wrist] : ground bracelet on pt's wrist  [Contacts removed] : contacts removed  [Remove nail polish] : remove nail polish  [No reading material] : no reading material  [Bra, undergarments removed] : bra, undergarments removed  [No contraindicated dressings] : no contraindicated dressings [Ground Wire - VISUAL Verification - Intact/Free of Obstruction] : Ground Wire - VISUAL Verification - Intact/Free of Obstruction  [Ground Continuity - Verified < 1 ohm w/ Wrist Strap Lamin] : Ground Continuity - Verified < 1 ohm w/ Wrist Strap Lamin [Number: ___] : Number: [unfilled] [Diagnosis: ___] : Diagnosis: [unfilled] [Clear all fields] : clear all fields [] : No [FreeTextEntry3] : 90 [FreeTextEntry5] : 8:06 [FreeTextEntry7] : 8:15 [FreeTextEntry9] : 9:45 [de-identified] : 0988 [de-identified] : 108 MINUTES

## 2021-11-22 NOTE — ADDENDUM
[FreeTextEntry1] : PT ARRIVED AMBULATORY A&0X3\par ALL VITALS WITHIN PARAMETERS FOR HBOT.\par NO DRAINAGE NOTED ON DRESSING PRIOR TO DESCENT. WOUND CARE TO FOLLOW HBOT.\par COVID SWAB TO BE OBTAINED FOLLOWING HBOT.\par TRANSFER OF CARE TO  @8:06\par PT DESCENDED TO 2.0 MABEL @ 1.75 PSI/MIN IN CHAMBER #2 WITHOUT INCIDENT\par PT RESTING AT TX DEPTH WITH VISIBLE CHEST RISE AND FALL OBSERVED CHAMBER SIDE\par PT ASCENDED FROM 2.0 MABEL @ 1.75 PSI/MIN WITHOUT INCIDENT\par PT TOLERATED TX WELL

## 2021-11-23 ENCOUNTER — APPOINTMENT (OUTPATIENT)
Dept: HYPERBARIC MEDICINE | Facility: HOSPITAL | Age: 76
End: 2021-11-23
Payer: MEDICARE

## 2021-11-23 ENCOUNTER — OUTPATIENT (OUTPATIENT)
Dept: OUTPATIENT SERVICES | Facility: HOSPITAL | Age: 76
LOS: 1 days | Discharge: HOME | End: 2021-11-23
Payer: MEDICARE

## 2021-11-23 VITALS
RESPIRATION RATE: 20 BRPM | OXYGEN SATURATION: 100 % | SYSTOLIC BLOOD PRESSURE: 122 MMHG | TEMPERATURE: 97 F | HEART RATE: 63 BPM | DIASTOLIC BLOOD PRESSURE: 73 MMHG

## 2021-11-23 DIAGNOSIS — Z89.422 ACQUIRED ABSENCE OF OTHER LEFT TOE(S): ICD-10-CM
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
## 2021-11-23 DIAGNOSIS — E11.621 TYPE 2 DIABETES MELLITUS WITH FOOT ULCER: ICD-10-CM

## 2021-11-23 DIAGNOSIS — T86.828 OTHER COMPLICATIONS OF SKIN GRAFT (ALLOGRAFT) (AUTOGRAFT): ICD-10-CM

## 2021-11-23 DIAGNOSIS — L97.524 NON-PRESSURE CHRONIC ULCER OF OTHER PART OF LEFT FOOT WITH NECROSIS OF BONE: ICD-10-CM

## 2021-11-23 DIAGNOSIS — Z90.89 ACQUIRED ABSENCE OF OTHER ORGANS: Chronic | ICD-10-CM

## 2021-11-23 PROCEDURE — 82962 GLUCOSE BLOOD TEST: CPT

## 2021-11-23 PROCEDURE — 99183 HYPERBARIC OXYGEN THERAPY: CPT

## 2021-11-23 PROCEDURE — G0277: CPT

## 2021-11-23 NOTE — ADDENDUM
[FreeTextEntry1] : Pt descended to 2.0 MABEL @ 1.75 PSI/MIN without incident in chamber #2.\par Pt resting comfortably @ depth, with equal chest rise observed throughout tx.\par Pt ascended from 2.0 MABEL @ 1.75 PSI/MIN without incident in chamber #2.\par Pt tolerated treatment well.\par

## 2021-11-23 NOTE — PROCEDURE
[Outpatient] : Outpatient [Ambulatory] : Patient is ambulatory. [THIS CHAMBER HAS BEEN CLEANED / DISINFECTED] : This chamber has been cleaned / disinfected according to local and hospital policy and procedure prior to this treatment. [____] : Post-Dive: Time - [unfilled] [___] : Post-Dive: Value - [unfilled] mg/dL [Patient demonstrated and verbalized proper technique for using air break mask] : Patient demonstrated and verbalized proper technique for using air break mask [Patient educated on the risks of SMOKING prior to HBOT with understanding] : Patient educated on the risks of SMOKING prior to HBOT with understanding [Patient educated on the risks of CONSUMING ALCOHOL prior to HBOT with understanding] : Patient educated on the risks of CONSUMING ALCOHOL prior to HBOT with understanding [100% Cotton] : 100% cotton [Empty all pockets] : empty all pockets [No hair oils, wigs, hairpieces, pins] : no hair oils, wigs, hairpieces, pins  [Pre tx medications] : pre tx medications  [No make-up, creams] : no make-up, creams  [No jewelry] : no jewelry  [No matches, cigarettes, lighters] : no matches, cigarettes, lighters  [Hearing aid removed] : hearing aid removed [Dentures removed] : dentures removed [Ground bracelet on pt's wrist] : ground bracelet on pt's wrist  [Contacts removed] : contacts removed  [Remove nail polish] : remove nail polish  [No reading material] : no reading material  [Bra, undergarments removed] : bra, undergarments removed  [No contraindicated dressings] : no contraindicated dressings [Ground Wire - VISUAL Verification - Intact/Free of Obstruction] : Ground Wire - VISUAL Verification - Intact/Free of Obstruction  [Ground Continuity - Verified < 1 ohm w/ Wrist Strap Lamin] : Ground Continuity - Verified < 1 ohm w/ Wrist Strap Lamin [Clear all fields] : clear all fields [Number: ___] : Number: [unfilled] [Diagnosis: ___] : Diagnosis: [unfilled] [] : No [FreeTextEntry3] : 90 [FreeTextEntry5] : 2879 [FreeTextEntry7] : 6073 [FreeTextEntry9] : 0105 [de-identified] : 0928 [de-identified] : 108mins

## 2021-11-24 ENCOUNTER — NON-APPOINTMENT (OUTPATIENT)
Age: 76
End: 2021-11-24

## 2021-11-24 ENCOUNTER — OUTPATIENT (OUTPATIENT)
Dept: OUTPATIENT SERVICES | Facility: HOSPITAL | Age: 76
LOS: 1 days | Discharge: HOME | End: 2021-11-24
Payer: MEDICARE

## 2021-11-24 ENCOUNTER — APPOINTMENT (OUTPATIENT)
Dept: HYPERBARIC MEDICINE | Facility: HOSPITAL | Age: 76
End: 2021-11-24
Payer: MEDICARE

## 2021-11-24 VITALS
RESPIRATION RATE: 18 BRPM | WEIGHT: 230 LBS | HEIGHT: 72 IN | TEMPERATURE: 97.1 F | SYSTOLIC BLOOD PRESSURE: 106 MMHG | BODY MASS INDEX: 31.15 KG/M2 | OXYGEN SATURATION: 96 % | HEART RATE: 79 BPM | DIASTOLIC BLOOD PRESSURE: 66 MMHG

## 2021-11-24 DIAGNOSIS — E11.621 TYPE 2 DIABETES MELLITUS WITH FOOT ULCER: ICD-10-CM

## 2021-11-24 DIAGNOSIS — Z90.89 ACQUIRED ABSENCE OF OTHER ORGANS: Chronic | ICD-10-CM

## 2021-11-24 PROCEDURE — 99213 OFFICE O/P EST LOW 20 MIN: CPT

## 2021-11-24 PROCEDURE — G0463: CPT

## 2021-11-24 NOTE — HISTORY OF PRESENT ILLNESS
[FreeTextEntry1] : 74 yo WM, here for his weekly assessment. Pt presently undergoing HBO tx. 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 Presently finishing up doxycycline.

## 2021-11-24 NOTE — PHYSICAL EXAM
[Normal Thyroid] : the thyroid was normal [Normal Breath Sounds] : Normal breath sounds [Normal Heart Sounds] : normal heart sounds [Normal Rate and Rhythm] : normal rate and rhythm [Alert] : alert [Oriented to Person] : oriented to person [Oriented to Place] : oriented to place [Oriented to Time] : oriented to time [Calm] : calm [4 x 4] : 4 x 4  [JVD] : no jugular venous distention  [Abdomen Masses] : No abdominal massess [Abdomen Tenderness] : ~T ~M No abdominal tenderness [Tender] : nontender [Enlarged] : not enlarged [de-identified] : elderly WM, NAD, alert, Ox 3. [FreeTextEntry7] : Left foot 2nd distal amp site - Sutures in place [de-identified] : Scant Serous/sanguinous [de-identified] : Silver alginate [de-identified] : Cleansed with NS\par Kerlix  [de-identified] : Left foot 3rd distal digit - No open wound  [de-identified] : Ecchymosis under Callus  [de-identified] : Dry dressing  [de-identified] : Cleansed with NS\par Kerlix  [FreeTextEntry1] : Left foot 4th digit - Closed  [de-identified] : Normal [de-identified] : None [de-identified] : None [de-identified] : No [de-identified] : 3x Weekly [de-identified] : Primary Dressing [de-identified] : None [de-identified] : other [de-identified] : None [de-identified] : None [de-identified] : No [de-identified] : 3x Weekly [de-identified] : Secondary Dressing

## 2021-11-24 NOTE — REVIEW OF SYSTEMS
[Skin Wound] : skin wound [FreeTextEntry5] : HTN [de-identified] : right hallux distal ulcer down to skin, subcutaneous tissue, fat, and bone [de-identified] : diabetic neuropathy [de-identified] : type 2 diabetes

## 2021-11-24 NOTE — ASSESSMENT
[Verbal] : Verbal [Written] : Written [Demo] : Demo [Patient] : Patient [Good - alert, interested, motivated] : Good - alert, interested, motivated [Verbalizes knowledge/Understanding] : Verbalizes knowledge/understanding [Dressing changes] : dressing changes [Foot Care] : foot care [Skin Care] : skin care [Signs and symptoms of infection] : sign and symptoms of infection [Nutrition] : nutrition [How and When to Call] : how and when to call [Labs and Tests] : labs and tests [Pain Management] : pain management [Hyperbaric Therapy] : hyperbaric therapy [Patient responsibility to plan of care] : patient responsibility to plan of care [Glycemic Control] : glycemic control [] : Yes [Stable] : stable [Home] : Home [Ambulatory] : Ambulatory [Not Applicable - Long Term Care/Home Health Agency] : Long Term Care/Home Health Agency: Not Applicable [FreeTextEntry2] : Infection prevention\par Localized wound care \par Goal of remaining pain free regarding wounds.\par Offloading \par Low glycemic diet \par Hyperbaric oxygen therapy  [FreeTextEntry4] : 12/30 HBOT completed \par Pathology results reviewed by MD with patient \par Patient to finish oral Doxycycline \par Assessment in 1 week

## 2021-11-26 ENCOUNTER — APPOINTMENT (OUTPATIENT)
Dept: HYPERBARIC MEDICINE | Facility: HOSPITAL | Age: 76
End: 2021-11-26
Payer: MEDICARE

## 2021-11-26 ENCOUNTER — OUTPATIENT (OUTPATIENT)
Dept: OUTPATIENT SERVICES | Facility: HOSPITAL | Age: 76
LOS: 1 days | Discharge: HOME | End: 2021-11-26
Payer: MEDICARE

## 2021-11-26 VITALS
SYSTOLIC BLOOD PRESSURE: 135 MMHG | RESPIRATION RATE: 20 BRPM | TEMPERATURE: 97.5 F | DIASTOLIC BLOOD PRESSURE: 77 MMHG | HEART RATE: 68 BPM | OXYGEN SATURATION: 100 %

## 2021-11-26 VITALS
DIASTOLIC BLOOD PRESSURE: 71 MMHG | RESPIRATION RATE: 20 BRPM | HEART RATE: 71 BPM | OXYGEN SATURATION: 99 % | TEMPERATURE: 97.2 F | SYSTOLIC BLOOD PRESSURE: 129 MMHG

## 2021-11-26 DIAGNOSIS — Z89.412 ACQUIRED ABSENCE OF LEFT GREAT TOE: ICD-10-CM

## 2021-11-26 DIAGNOSIS — Z98.890 OTHER SPECIFIED POSTPROCEDURAL STATES: ICD-10-CM

## 2021-11-26 DIAGNOSIS — Z79.899 OTHER LONG TERM (CURRENT) DRUG THERAPY: ICD-10-CM

## 2021-11-26 DIAGNOSIS — I10 ESSENTIAL (PRIMARY) HYPERTENSION: ICD-10-CM

## 2021-11-26 DIAGNOSIS — N28.9 DISORDER OF KIDNEY AND URETER, UNSPECIFIED: ICD-10-CM

## 2021-11-26 DIAGNOSIS — E78.2 MIXED HYPERLIPIDEMIA: ICD-10-CM
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
## 2021-11-26 DIAGNOSIS — E88.81 METABOLIC SYNDROME AND OTHER INSULIN RESISTANCE: ICD-10-CM

## 2021-11-26 DIAGNOSIS — L97.524 NON-PRESSURE CHRONIC ULCER OF OTHER PART OF LEFT FOOT WITH NECROSIS OF BONE: ICD-10-CM

## 2021-11-26 DIAGNOSIS — Z89.422 ACQUIRED ABSENCE OF OTHER LEFT TOE(S): ICD-10-CM

## 2021-11-26 DIAGNOSIS — Z90.89 ACQUIRED ABSENCE OF OTHER ORGANS: Chronic | ICD-10-CM

## 2021-11-26 DIAGNOSIS — E11.69 TYPE 2 DIABETES MELLITUS WITH OTHER SPECIFIED COMPLICATION: ICD-10-CM

## 2021-11-26 DIAGNOSIS — E11.40 TYPE 2 DIABETES MELLITUS WITH DIABETIC NEUROPATHY, UNSPECIFIED: ICD-10-CM

## 2021-11-26 DIAGNOSIS — E11.621 TYPE 2 DIABETES MELLITUS WITH FOOT ULCER: ICD-10-CM

## 2021-11-26 DIAGNOSIS — Z79.84 LONG TERM (CURRENT) USE OF ORAL HYPOGLYCEMIC DRUGS: ICD-10-CM

## 2021-11-26 DIAGNOSIS — Z87.891 PERSONAL HISTORY OF NICOTINE DEPENDENCE: ICD-10-CM

## 2021-11-26 DIAGNOSIS — Z79.82 LONG TERM (CURRENT) USE OF ASPIRIN: ICD-10-CM

## 2021-11-26 DIAGNOSIS — M86.672 OTHER CHRONIC OSTEOMYELITIS, LEFT ANKLE AND FOOT: ICD-10-CM

## 2021-11-26 DIAGNOSIS — T86.828 OTHER COMPLICATIONS OF SKIN GRAFT (ALLOGRAFT) (AUTOGRAFT): ICD-10-CM

## 2021-11-26 PROCEDURE — 82962 GLUCOSE BLOOD TEST: CPT

## 2021-11-26 PROCEDURE — 99183 HYPERBARIC OXYGEN THERAPY: CPT

## 2021-11-26 PROCEDURE — G0277: CPT

## 2021-11-27 DIAGNOSIS — T86.828 OTHER COMPLICATIONS OF SKIN GRAFT (ALLOGRAFT) (AUTOGRAFT): ICD-10-CM
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
## 2021-11-27 DIAGNOSIS — E11.621 TYPE 2 DIABETES MELLITUS WITH FOOT ULCER: ICD-10-CM

## 2021-11-27 DIAGNOSIS — L97.524 NON-PRESSURE CHRONIC ULCER OF OTHER PART OF LEFT FOOT WITH NECROSIS OF BONE: ICD-10-CM

## 2021-11-29 ENCOUNTER — APPOINTMENT (OUTPATIENT)
Dept: HYPERBARIC MEDICINE | Facility: HOSPITAL | Age: 76
End: 2021-11-29
Payer: MEDICARE

## 2021-11-29 ENCOUNTER — OUTPATIENT (OUTPATIENT)
Dept: OUTPATIENT SERVICES | Facility: HOSPITAL | Age: 76
LOS: 1 days | Discharge: HOME | End: 2021-11-29
Payer: MEDICARE

## 2021-11-29 VITALS
DIASTOLIC BLOOD PRESSURE: 56 MMHG | RESPIRATION RATE: 20 BRPM | TEMPERATURE: 97.1 F | SYSTOLIC BLOOD PRESSURE: 103 MMHG | OXYGEN SATURATION: 99 % | HEART RATE: 76 BPM

## 2021-11-29 VITALS
HEART RATE: 67 BPM | SYSTOLIC BLOOD PRESSURE: 120 MMHG | TEMPERATURE: 97.6 F | RESPIRATION RATE: 18 BRPM | DIASTOLIC BLOOD PRESSURE: 74 MMHG | OXYGEN SATURATION: 100 %

## 2021-11-29 DIAGNOSIS — Z90.89 ACQUIRED ABSENCE OF OTHER ORGANS: Chronic | ICD-10-CM
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
## 2021-11-29 DIAGNOSIS — T86.828 OTHER COMPLICATIONS OF SKIN GRAFT (ALLOGRAFT) (AUTOGRAFT): ICD-10-CM

## 2021-11-29 DIAGNOSIS — E11.621 TYPE 2 DIABETES MELLITUS WITH FOOT ULCER: ICD-10-CM

## 2021-11-29 DIAGNOSIS — L97.524 NON-PRESSURE CHRONIC ULCER OF OTHER PART OF LEFT FOOT WITH NECROSIS OF BONE: ICD-10-CM

## 2021-11-29 LAB — SARS-COV-2 RNA SPEC QL NAA+PROBE: SIGNIFICANT CHANGE UP

## 2021-11-29 PROCEDURE — 99183 HYPERBARIC OXYGEN THERAPY: CPT

## 2021-11-29 PROCEDURE — G0277: CPT

## 2021-11-29 PROCEDURE — U0005: CPT

## 2021-11-29 PROCEDURE — U0003: CPT

## 2021-11-29 PROCEDURE — 82962 GLUCOSE BLOOD TEST: CPT

## 2021-11-29 NOTE — PROCEDURE
[Outpatient] : Outpatient [Ambulatory] : Patient is ambulatory. [THIS CHAMBER HAS BEEN CLEANED / DISINFECTED] : This chamber has been cleaned / disinfected according to local and hospital policy and procedure prior to this treatment. [Patient demonstrated and verbalized proper technique for using air break mask] : Patient demonstrated and verbalized proper technique for using air break mask [Patient educated on the risks of SMOKING prior to HBOT with understanding] : Patient educated on the risks of SMOKING prior to HBOT with understanding [Patient educated on the risks of CONSUMING ALCOHOL prior to HBOT with understanding] : Patient educated on the risks of CONSUMING ALCOHOL prior to HBOT with understanding [100% Cotton] : 100% cotton [Empty all pockets] : empty all pockets [No hair oils, wigs, hairpieces, pins] : no hair oils, wigs, hairpieces, pins  [Pre tx medications] : pre tx medications  [No make-up, creams] : no make-up, creams  [No jewelry] : no jewelry  [No matches, cigarettes, lighters] : no matches, cigarettes, lighters  [Hearing aid removed] : hearing aid removed [Dentures removed] : dentures removed [Ground bracelet on pt's wrist] : ground bracelet on pt's wrist  [Contacts removed] : contacts removed  [Remove nail polish] : remove nail polish  [No reading material] : no reading material  [Bra, undergarments removed] : bra, undergarments removed  [No contraindicated dressings] : no contraindicated dressings [Ground Wire - VISUAL Verification - Intact/Free of Obstruction] : Ground Wire - VISUAL Verification - Intact/Free of Obstruction  [Ground Continuity - Verified < 1 ohm w/ Wrist Strap Lamin] : Ground Continuity - Verified < 1 ohm w/ Wrist Strap Lamin [Number: ___] : Number: [unfilled] [Diagnosis: ___] : Diagnosis: [unfilled] [____] : Post-Dive: Time - [unfilled] [___] : Post-Dive: Value - [unfilled] mg/dL [Clear all fields] : clear all fields [] : No [FreeTextEntry3] : 90 [FreeTextEntry5] : 8:11 [FreeTextEntry7] : 8:20 [FreeTextEntry9] : 0397 [de-identified] : 0939 [de-identified] : 108 MINUTES

## 2021-11-29 NOTE — ADDENDUM
[FreeTextEntry1] : PT ARRIVED AMBULATORY A&OX3.\par ALL VITALS WITHIN PARAMETERS FOR HBOT.\par NO DRAINAGE NOTED ON DRESSING PRIOR TO DESCENT. WOUND CARE TO FOLLOW HBOT.\par COVID SWAB TO BE OBTAINED POST HBOT.\par PT DESCENT TO RX TX DEPTH IN CHAMBER 2 WAS WITHOUT INCIDENT.\par PT RESTING AT DEPTH, CHEST RISE AND FALL OBSERVED\par PT ASCENDED FROM 2.0 MABEL @ 1.75 PSI/MIN WITHOUT INCIDENT\par PT TOLERATED TX WELL\par PT RECEIVED WOUND CARE AND ROUTINE COVID SWAB POST HBOT

## 2021-11-30 ENCOUNTER — APPOINTMENT (OUTPATIENT)
Dept: HYPERBARIC MEDICINE | Facility: HOSPITAL | Age: 76
End: 2021-11-30
Payer: MEDICARE

## 2021-11-30 ENCOUNTER — OUTPATIENT (OUTPATIENT)
Dept: OUTPATIENT SERVICES | Facility: HOSPITAL | Age: 76
LOS: 1 days | Discharge: HOME | End: 2021-11-30
Payer: MEDICARE

## 2021-11-30 VITALS
OXYGEN SATURATION: 100 % | HEART RATE: 67 BPM | SYSTOLIC BLOOD PRESSURE: 119 MMHG | RESPIRATION RATE: 18 BRPM | TEMPERATURE: 97.8 F | DIASTOLIC BLOOD PRESSURE: 72 MMHG

## 2021-11-30 VITALS
TEMPERATURE: 97.3 F | RESPIRATION RATE: 18 BRPM | HEART RATE: 74 BPM | SYSTOLIC BLOOD PRESSURE: 115 MMHG | DIASTOLIC BLOOD PRESSURE: 68 MMHG | OXYGEN SATURATION: 99 %

## 2021-11-30 DIAGNOSIS — E11.621 TYPE 2 DIABETES MELLITUS WITH FOOT ULCER: ICD-10-CM

## 2021-11-30 DIAGNOSIS — T86.828 OTHER COMPLICATIONS OF SKIN GRAFT (ALLOGRAFT) (AUTOGRAFT): ICD-10-CM

## 2021-11-30 DIAGNOSIS — L97.524 NON-PRESSURE CHRONIC ULCER OF OTHER PART OF LEFT FOOT WITH NECROSIS OF BONE: ICD-10-CM

## 2021-11-30 DIAGNOSIS — Z90.89 ACQUIRED ABSENCE OF OTHER ORGANS: Chronic | ICD-10-CM
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
## 2021-11-30 PROCEDURE — 99183 HYPERBARIC OXYGEN THERAPY: CPT

## 2021-11-30 PROCEDURE — 82962 GLUCOSE BLOOD TEST: CPT

## 2021-11-30 PROCEDURE — G0277: CPT

## 2021-11-30 NOTE — ADDENDUM
[FreeTextEntry1] : PT ARRIVED A&OX3 \par ALL VITALS WITHIN PARAMETERS FOR HBOT WITH THE EXCEPTION OF BP\par PT WAS GIVEN WATER TO AID IN RAISING BLOOD PRESSURE\par MD NOTIFIED \par BP SERIES:\par 1: 88/53\par 2: 82/83\par 3: 92/52\par 4: 90/56\par 5: 104/76\par UPON 5TH BLOOD PRESSURE READING, PT BP WITHIN ACCEPTABLE LIMITS, MD CLEARED PT FOR TX \par PT DESCENDED TO 2.0 MABEL @ 1.75 PSI/MIN IN CHAMBER #2 WITHOUT INCIDENT\par PT RESTING AT TX DEPTH WITH VISIBLE CHEST RISE AND FALL OBSERVED CHAMBER SIDE \par PT ASCENDED FROM 2.0 MABEL @ 1.75 PSI/MIN WITHOUT INCIDENT\par PT TOLERATED TX WELL\par \par Due to chamber availability, the pt. was able to receive a full duration HBOT tx. without impacting additional HBOT pt. tx. despite delays due to hypotensive pre-treatment presentation. MD was advised of same.

## 2021-11-30 NOTE — ASSESSMENT
[No change from previous assessment] : No change from previous assessment [Patient descended without problem for 9 minutes] : Patient descended without problem for 9 minutes [No dizziness or thirst] :  No dizziness or thirst [No ear problems] : No ear problems [Vital signs stable] : Vital signs stable [Tolerating dive well] : Tolerating dive well [No Chest Pain, shortness of breath] : No Chest Pain, shortness of breath [Respiratory Rate Stable] : Respiratory Rate Stable [No chest pain, shortness of breath, or ear pain] :  No chest pain, shortness of breath, or ear pain  [Tolerated Ascent well] : Tolerated Ascent well [Vital Signs stable] : Vital Signs stable [A physician was present throughout the entire HBOT] : A physician was present throughout the entire HBOT [No] : No [Clinically Stable] : Clinically stable [Continue Treatment Plan] : Continue treatment plan [0] : 0 out of 10 [FreeTextEntry3] : pt's HBO tx delayed due to his persistent low BP. Was given hydration several times and rechecked and finally BP went above 100/70. Because of this, HBO tx shortened for today.  TENNILLE Banerjee MD.

## 2021-11-30 NOTE — ADDENDUM
[FreeTextEntry1] : No/Drainage noted on pt's dressing prior to descent. \par Pt descended to 2.0 MABEL @ 1.75 PSI/MIN without incident in chamber # 2\par Pt's resting at tx depth with chest rise and fall observed chamber side. \par Pt tolerated tx well.\par

## 2021-11-30 NOTE — PROCEDURE
[Outpatient] : Outpatient [Ambulatory] : Patient is ambulatory. [THIS CHAMBER HAS BEEN CLEANED / DISINFECTED] : This chamber has been cleaned / disinfected according to local and hospital policy and procedure prior to this treatment. [Patient demonstrated and verbalized proper technique for using air break mask] : Patient demonstrated and verbalized proper technique for using air break mask [Patient educated on the risks of SMOKING prior to HBOT with understanding] : Patient educated on the risks of SMOKING prior to HBOT with understanding [Patient educated on the risks of CONSUMING ALCOHOL prior to HBOT with understanding] : Patient educated on the risks of CONSUMING ALCOHOL prior to HBOT with understanding [100% Cotton] : 100% cotton [Empty all pockets] : empty all pockets [No hair oils, wigs, hairpieces, pins] : no hair oils, wigs, hairpieces, pins  [Pre tx medications] : pre tx medications  [No make-up, creams] : no make-up, creams  [No jewelry] : no jewelry  [No matches, cigarettes, lighters] : no matches, cigarettes, lighters  [Hearing aid removed] : hearing aid removed [Dentures removed] : dentures removed [Ground bracelet on pt's wrist] : ground bracelet on pt's wrist  [Contacts removed] : contacts removed  [Remove nail polish] : remove nail polish  [No reading material] : no reading material  [Bra, undergarments removed] : bra, undergarments removed  [No contraindicated dressings] : no contraindicated dressings [Ground Wire - VISUAL Verification - Intact/Free of Obstruction] : Ground Wire - VISUAL Verification - Intact/Free of Obstruction  [Ground Continuity - Verified < 1 ohm w/ Wrist Strap Lamin] : Ground Continuity - Verified < 1 ohm w/ Wrist Strap Lamin [Number: ___] : Number: [unfilled] [Diagnosis: ___] : Diagnosis: [unfilled] [____] : Post-Dive: Time - [unfilled] [___] : Post-Dive: Value - [unfilled] mg/dL [Clear all fields] : clear all fields [] : No [FreeTextEntry3] : 90 [FreeTextEntry5] : 9684 [FreeTextEntry7] : 6756 [FreeTextEntry9] : 1028 [de-identified] : 1025 [de-identified] : 108 MINUTES

## 2021-11-30 NOTE — PROCEDURE
[Outpatient] : Outpatient [Ambulatory] : Patient is ambulatory. [THIS CHAMBER HAS BEEN CLEANED / DISINFECTED] : This chamber has been cleaned / disinfected according to local and hospital policy and procedure prior to this treatment. [Patient demonstrated and verbalized proper technique for using air break mask] : Patient demonstrated and verbalized proper technique for using air break mask [Patient educated on the risks of SMOKING prior to HBOT with understanding] : Patient educated on the risks of SMOKING prior to HBOT with understanding [Patient educated on the risks of CONSUMING ALCOHOL prior to HBOT with understanding] : Patient educated on the risks of CONSUMING ALCOHOL prior to HBOT with understanding [100% Cotton] : 100% cotton [Empty all pockets] : empty all pockets [No hair oils, wigs, hairpieces, pins] : no hair oils, wigs, hairpieces, pins  [Pre tx medications] : pre tx medications  [No make-up, creams] : no make-up, creams  [No jewelry] : no jewelry  [No matches, cigarettes, lighters] : no matches, cigarettes, lighters  [Hearing aid removed] : hearing aid removed [Dentures removed] : dentures removed [Ground bracelet on pt's wrist] : ground bracelet on pt's wrist  [Contacts removed] : contacts removed  [Remove nail polish] : remove nail polish  [No reading material] : no reading material  [Bra, undergarments removed] : bra, undergarments removed  [No contraindicated dressings] : no contraindicated dressings [Ground Wire - VISUAL Verification - Intact/Free of Obstruction] : Ground Wire - VISUAL Verification - Intact/Free of Obstruction  [Ground Continuity - Verified < 1 ohm w/ Wrist Strap Lamin] : Ground Continuity - Verified < 1 ohm w/ Wrist Strap Lamin [Number: ___] : Number: [unfilled] [Diagnosis: ___] : Diagnosis: [unfilled] [____] : Post-Dive: Time - [unfilled] [___] : Post-Dive: Value - [unfilled] mg/dL [Clear all fields] : clear all fields [] : No [FreeTextEntry3] : 90 [FreeTextEntry5] : 1773 [FreeTextEntry7] :  5760 [FreeTextEntry9] : 4562 [de-identified] : 3803 [de-identified] : 108 MIN

## 2021-12-01 ENCOUNTER — APPOINTMENT (OUTPATIENT)
Dept: WOUND CARE | Facility: HOSPITAL | Age: 76
End: 2021-12-01
Payer: MEDICARE

## 2021-12-01 ENCOUNTER — OUTPATIENT (OUTPATIENT)
Dept: OUTPATIENT SERVICES | Facility: HOSPITAL | Age: 76
LOS: 1 days | Discharge: HOME | End: 2021-12-01
Payer: MEDICARE

## 2021-12-01 VITALS
RESPIRATION RATE: 18 BRPM | WEIGHT: 230 LBS | DIASTOLIC BLOOD PRESSURE: 70 MMHG | SYSTOLIC BLOOD PRESSURE: 91 MMHG | OXYGEN SATURATION: 99 % | HEART RATE: 75 BPM | TEMPERATURE: 97.3 F | BODY MASS INDEX: 31.15 KG/M2 | HEIGHT: 72 IN

## 2021-12-01 DIAGNOSIS — Z90.89 ACQUIRED ABSENCE OF OTHER ORGANS: Chronic | ICD-10-CM

## 2021-12-01 DIAGNOSIS — E11.621 TYPE 2 DIABETES MELLITUS WITH FOOT ULCER: ICD-10-CM

## 2021-12-01 PROCEDURE — 99212 OFFICE O/P EST SF 10 MIN: CPT

## 2021-12-01 PROCEDURE — G0463: CPT

## 2021-12-02 ENCOUNTER — OUTPATIENT (OUTPATIENT)
Dept: OUTPATIENT SERVICES | Facility: HOSPITAL | Age: 76
LOS: 1 days | Discharge: HOME | End: 2021-12-02
Payer: MEDICARE

## 2021-12-02 ENCOUNTER — APPOINTMENT (OUTPATIENT)
Dept: HYPERBARIC MEDICINE | Facility: HOSPITAL | Age: 76
End: 2021-12-02
Payer: MEDICARE

## 2021-12-02 VITALS
OXYGEN SATURATION: 98 % | SYSTOLIC BLOOD PRESSURE: 121 MMHG | DIASTOLIC BLOOD PRESSURE: 67 MMHG | RESPIRATION RATE: 20 BRPM | HEART RATE: 78 BPM | TEMPERATURE: 97.2 F

## 2021-12-02 VITALS
OXYGEN SATURATION: 100 % | HEART RATE: 71 BPM | DIASTOLIC BLOOD PRESSURE: 73 MMHG | TEMPERATURE: 97.8 F | SYSTOLIC BLOOD PRESSURE: 118 MMHG | RESPIRATION RATE: 20 BRPM

## 2021-12-02 DIAGNOSIS — Z90.89 ACQUIRED ABSENCE OF OTHER ORGANS: Chronic | ICD-10-CM

## 2021-12-02 DIAGNOSIS — E11.621 TYPE 2 DIABETES MELLITUS WITH FOOT ULCER: ICD-10-CM

## 2021-12-02 DIAGNOSIS — Z98.890 OTHER SPECIFIED POSTPROCEDURAL STATES: ICD-10-CM

## 2021-12-02 DIAGNOSIS — Z79.84 LONG TERM (CURRENT) USE OF ORAL HYPOGLYCEMIC DRUGS: ICD-10-CM

## 2021-12-02 DIAGNOSIS — T86.828 OTHER COMPLICATIONS OF SKIN GRAFT (ALLOGRAFT) (AUTOGRAFT): ICD-10-CM

## 2021-12-02 DIAGNOSIS — L97.524 NON-PRESSURE CHRONIC ULCER OF OTHER PART OF LEFT FOOT WITH NECROSIS OF BONE: ICD-10-CM
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
## 2021-12-02 DIAGNOSIS — E78.2 MIXED HYPERLIPIDEMIA: ICD-10-CM

## 2021-12-02 DIAGNOSIS — N28.9 DISORDER OF KIDNEY AND URETER, UNSPECIFIED: ICD-10-CM

## 2021-12-02 DIAGNOSIS — E11.40 TYPE 2 DIABETES MELLITUS WITH DIABETIC NEUROPATHY, UNSPECIFIED: ICD-10-CM

## 2021-12-02 DIAGNOSIS — E88.81 METABOLIC SYNDROME AND OTHER INSULIN RESISTANCE: ICD-10-CM

## 2021-12-02 DIAGNOSIS — Z79.82 LONG TERM (CURRENT) USE OF ASPIRIN: ICD-10-CM

## 2021-12-02 DIAGNOSIS — Z89.422 ACQUIRED ABSENCE OF OTHER LEFT TOE(S): ICD-10-CM

## 2021-12-02 DIAGNOSIS — Z87.891 PERSONAL HISTORY OF NICOTINE DEPENDENCE: ICD-10-CM

## 2021-12-02 DIAGNOSIS — I10 ESSENTIAL (PRIMARY) HYPERTENSION: ICD-10-CM

## 2021-12-02 DIAGNOSIS — L84 CORNS AND CALLOSITIES: ICD-10-CM

## 2021-12-02 DIAGNOSIS — Z89.412 ACQUIRED ABSENCE OF LEFT GREAT TOE: ICD-10-CM

## 2021-12-02 DIAGNOSIS — Z79.899 OTHER LONG TERM (CURRENT) DRUG THERAPY: ICD-10-CM

## 2021-12-02 PROCEDURE — 99183 HYPERBARIC OXYGEN THERAPY: CPT

## 2021-12-02 PROCEDURE — G0277: CPT

## 2021-12-02 PROCEDURE — 82962 GLUCOSE BLOOD TEST: CPT

## 2021-12-02 NOTE — PROCEDURE
[Outpatient] : Outpatient [Ambulatory] : Patient is ambulatory. [THIS CHAMBER HAS BEEN CLEANED / DISINFECTED] : This chamber has been cleaned / disinfected according to local and hospital policy and procedure prior to this treatment. [____] : Post-Dive: Time - [unfilled] [___] : Post-Dive: Value - [unfilled] mg/dL [Patient demonstrated and verbalized proper technique for using air break mask] : Patient demonstrated and verbalized proper technique for using air break mask [Patient educated on the risks of SMOKING prior to HBOT with understanding] : Patient educated on the risks of SMOKING prior to HBOT with understanding [Patient educated on the risks of CONSUMING ALCOHOL prior to HBOT with understanding] : Patient educated on the risks of CONSUMING ALCOHOL prior to HBOT with understanding [100% Cotton] : 100% cotton [Empty all pockets] : empty all pockets [No hair oils, wigs, hairpieces, pins] : no hair oils, wigs, hairpieces, pins  [Pre tx medications] : pre tx medications  [No make-up, creams] : no make-up, creams  [No jewelry] : no jewelry  [No matches, cigarettes, lighters] : no matches, cigarettes, lighters  [Hearing aid removed] : hearing aid removed [Dentures removed] : dentures removed [Ground bracelet on pt's wrist] : ground bracelet on pt's wrist  [Contacts removed] : contacts removed  [Remove nail polish] : remove nail polish  [No reading material] : no reading material  [Bra, undergarments removed] : bra, undergarments removed  [No contraindicated dressings] : no contraindicated dressings [Ground Wire - VISUAL Verification - Intact/Free of Obstruction] : Ground Wire - VISUAL Verification - Intact/Free of Obstruction  [Ground Continuity - Verified < 1 ohm w/ Wrist Strap Lamin] : Ground Continuity - Verified < 1 ohm w/ Wrist Strap Lamin [Clear all fields] : clear all fields [Number: ___] : Number: [unfilled] [Diagnosis: ___] : Diagnosis: [unfilled] [] : No [FreeTextEntry3] : 90 [FreeTextEntry5] : 0777 [FreeTextEntry7] : 9755 [FreeTextEntry9] : 4702 [de-identified] : 1009 [de-identified] : 108mins

## 2021-12-02 NOTE — PLAN
[FreeTextEntry1] : Continue HBOT and protective dressings left foot   Spent 20 minutes for patient care and medical decision making.\par

## 2021-12-02 NOTE — REVIEW OF SYSTEMS
[Joint Stiffness] : joint stiffness [Fever] : no fever [Chills] : no chills [Eye Pain] : no eye pain [Loss Of Hearing] : no hearing loss [Shortness Of Breath] : no shortness of breath [Wheezing] : no wheezing [Abdominal Pain] : no abdominal pain [Skin Wound] : no skin wound [Anxiety] : no anxiety [Easy Bleeding] : no tendency for easy bleeding [Easy Bruising] : no tendency for easy bruising [FreeTextEntry5] : HLD, HTN [de-identified] : all amp sites left foot closed  [de-identified] : NIDDM with neuropathy  [de-identified] : NIDDM

## 2021-12-02 NOTE — PHYSICAL EXAM
[4 x 4] : 4 x 4  [2+] : left 2+ [Alert] : alert [Oriented to Person] : oriented to person [Oriented to Place] : oriented to place [Calm] : calm [Ankle Swelling (On Exam)] : not present [Varicose Veins Of Lower Extremities] : not present [] : not present [Skin Ulcer] : no ulcer [de-identified] : A&Ox3, NAD [de-identified] : HTN, HLD  [de-identified] : 5/5 strength in all quadrants bilaterally [de-identified] :  left 2nd digit distal amp healed , no soi  [de-identified] : loss of protective sensation bilaterally , NIDDM with neuropathy  [de-identified] : sutures removed by DPM\par small amount of sanguineous drainage noted [FreeTextEntry7] : Left foot 2nd distal amp site - Sutures Removed by DPM [de-identified] : dried sanguineous  [de-identified] : dry dressing [de-identified] : Cleansed with NS\par Toe sock\par *Silver Alginate applied in office* [de-identified] : callus shaved by GUY [de-identified] : Left foot 3rd distal digit - No open wound  [de-identified] : Ecchymosis under Callus  [de-identified] : none [de-identified] : Cleansed with NS\par Kerlix  [FreeTextEntry1] : Left foot 4th digit - Closed  [de-identified] : No [de-identified] : Surgical [de-identified] : No [de-identified] : other [de-identified] : None [de-identified] : None [de-identified] : 100% [de-identified] : No [de-identified] : 3x Weekly [de-identified] : Secondary Dressing [de-identified] : None [de-identified] : other [de-identified] : None [de-identified] : None [de-identified] : No [de-identified] : 3x Weekly [de-identified] : Secondary Dressing

## 2021-12-02 NOTE — ASSESSMENT
[Verbal] : Verbal [Written] : Written [Demo] : Demo [Patient] : Patient [Good - alert, interested, motivated] : Good - alert, interested, motivated [Verbalizes knowledge/Understanding] : Verbalizes knowledge/understanding [Dressing changes] : dressing changes [Foot Care] : foot care [Skin Care] : skin care [Signs and symptoms of infection] : sign and symptoms of infection [Nutrition] : nutrition [How and When to Call] : how and when to call [Hyperbaric Therapy] : hyperbaric therapy [Patient responsibility to plan of care] : patient responsibility to plan of care [Glycemic Control] : glycemic control [Stable] : stable [Home] : Home [Ambulatory] : Ambulatory [Not Applicable - Long Term Care/Home Health Agency] : Long Term Care/Home Health Agency: Not Applicable [] : No [FreeTextEntry2] : Infection prevention\par Promote Skin Integrity\par HBOT\par Encourage Glycemic Control\par  [FreeTextEntry4] : 15/30 HBOT completed \par Doxycycline completed, no additional antibiotics ordered \par F/U 2 weeks for assessment and daily for HBOT

## 2021-12-03 ENCOUNTER — OUTPATIENT (OUTPATIENT)
Dept: OUTPATIENT SERVICES | Facility: HOSPITAL | Age: 76
LOS: 1 days | Discharge: HOME | End: 2021-12-03
Payer: MEDICARE

## 2021-12-03 ENCOUNTER — APPOINTMENT (OUTPATIENT)
Dept: HYPERBARIC MEDICINE | Facility: HOSPITAL | Age: 76
End: 2021-12-03
Payer: MEDICARE

## 2021-12-03 VITALS
OXYGEN SATURATION: 100 % | DIASTOLIC BLOOD PRESSURE: 79 MMHG | RESPIRATION RATE: 20 BRPM | TEMPERATURE: 97.2 F | HEART RATE: 67 BPM | SYSTOLIC BLOOD PRESSURE: 124 MMHG

## 2021-12-03 VITALS
TEMPERATURE: 97.3 F | HEART RATE: 76 BPM | DIASTOLIC BLOOD PRESSURE: 76 MMHG | RESPIRATION RATE: 20 BRPM | SYSTOLIC BLOOD PRESSURE: 138 MMHG | OXYGEN SATURATION: 100 %

## 2021-12-03 DIAGNOSIS — T86.828 OTHER COMPLICATIONS OF SKIN GRAFT (ALLOGRAFT) (AUTOGRAFT): ICD-10-CM

## 2021-12-03 DIAGNOSIS — Z90.89 ACQUIRED ABSENCE OF OTHER ORGANS: Chronic | ICD-10-CM

## 2021-12-03 DIAGNOSIS — L97.524 NON-PRESSURE CHRONIC ULCER OF OTHER PART OF LEFT FOOT WITH NECROSIS OF BONE: ICD-10-CM

## 2021-12-03 DIAGNOSIS — E11.621 TYPE 2 DIABETES MELLITUS WITH FOOT ULCER: ICD-10-CM
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
## 2021-12-03 PROCEDURE — G0277: CPT

## 2021-12-03 PROCEDURE — 82962 GLUCOSE BLOOD TEST: CPT

## 2021-12-03 PROCEDURE — 99183 HYPERBARIC OXYGEN THERAPY: CPT

## 2021-12-06 ENCOUNTER — APPOINTMENT (OUTPATIENT)
Dept: HYPERBARIC MEDICINE | Facility: HOSPITAL | Age: 76
End: 2021-12-06
Payer: MEDICARE

## 2021-12-06 ENCOUNTER — OUTPATIENT (OUTPATIENT)
Dept: OUTPATIENT SERVICES | Facility: HOSPITAL | Age: 76
LOS: 1 days | Discharge: HOME | End: 2021-12-06
Payer: MEDICARE

## 2021-12-06 VITALS
RESPIRATION RATE: 18 BRPM | DIASTOLIC BLOOD PRESSURE: 74 MMHG | TEMPERATURE: 97.2 F | HEART RATE: 70 BPM | SYSTOLIC BLOOD PRESSURE: 129 MMHG | OXYGEN SATURATION: 100 %

## 2021-12-06 VITALS
OXYGEN SATURATION: 97 % | DIASTOLIC BLOOD PRESSURE: 75 MMHG | HEART RATE: 77 BPM | SYSTOLIC BLOOD PRESSURE: 128 MMHG | RESPIRATION RATE: 20 BRPM | TEMPERATURE: 97 F

## 2021-12-06 DIAGNOSIS — E11.621 TYPE 2 DIABETES MELLITUS WITH FOOT ULCER: ICD-10-CM

## 2021-12-06 DIAGNOSIS — Z90.89 ACQUIRED ABSENCE OF OTHER ORGANS: Chronic | ICD-10-CM

## 2021-12-06 DIAGNOSIS — T86.828 OTHER COMPLICATIONS OF SKIN GRAFT (ALLOGRAFT) (AUTOGRAFT): ICD-10-CM

## 2021-12-06 DIAGNOSIS — L97.524 NON-PRESSURE CHRONIC ULCER OF OTHER PART OF LEFT FOOT WITH NECROSIS OF BONE: ICD-10-CM
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
## 2021-12-06 LAB — SARS-COV-2 RNA SPEC QL NAA+PROBE: SIGNIFICANT CHANGE UP

## 2021-12-06 PROCEDURE — U0005: CPT

## 2021-12-06 PROCEDURE — U0003: CPT

## 2021-12-06 PROCEDURE — 82962 GLUCOSE BLOOD TEST: CPT

## 2021-12-06 PROCEDURE — G0277: CPT

## 2021-12-06 PROCEDURE — 99183 HYPERBARIC OXYGEN THERAPY: CPT

## 2021-12-06 NOTE — PROCEDURE
[Outpatient] : Outpatient [Ambulatory] : Patient is ambulatory. [THIS CHAMBER HAS BEEN CLEANED / DISINFECTED] : This chamber has been cleaned / disinfected according to local and hospital policy and procedure prior to this treatment. [Patient demonstrated and verbalized proper technique for using air break mask] : Patient demonstrated and verbalized proper technique for using air break mask [Patient educated on the risks of SMOKING prior to HBOT with understanding] : Patient educated on the risks of SMOKING prior to HBOT with understanding [Patient educated on the risks of CONSUMING ALCOHOL prior to HBOT with understanding] : Patient educated on the risks of CONSUMING ALCOHOL prior to HBOT with understanding [100% Cotton] : 100% cotton [Empty all pockets] : empty all pockets [No hair oils, wigs, hairpieces, pins] : no hair oils, wigs, hairpieces, pins  [Pre tx medications] : pre tx medications  [No make-up, creams] : no make-up, creams  [No jewelry] : no jewelry  [No matches, cigarettes, lighters] : no matches, cigarettes, lighters  [Hearing aid removed] : hearing aid removed [Dentures removed] : dentures removed [Ground bracelet on pt's wrist] : ground bracelet on pt's wrist  [Contacts removed] : contacts removed  [Remove nail polish] : remove nail polish  [No reading material] : no reading material  [Bra, undergarments removed] : bra, undergarments removed  [No contraindicated dressings] : no contraindicated dressings [Ground Wire - VISUAL Verification - Intact/Free of Obstruction] : Ground Wire - VISUAL Verification - Intact/Free of Obstruction  [Ground Continuity - Verified < 1 ohm w/ Wrist Strap Lamin] : Ground Continuity - Verified < 1 ohm w/ Wrist Strap Lamin [____] : Post-Dive: Time - [unfilled] [___] : Post-Dive: Value - [unfilled] mg/dL [Clear all fields] : clear all fields [Number: ___] : Number: [unfilled] [Diagnosis: ___] : Diagnosis: [unfilled] [] : No [FreeTextEntry3] : 90 [FreeTextEntry5] : 8:06 [FreeTextEntry7] : 8:15 [FreeTextEntry9] : 2502 [de-identified] : 0902 [de-identified] : 108 MINUTES

## 2021-12-07 ENCOUNTER — OUTPATIENT (OUTPATIENT)
Dept: OUTPATIENT SERVICES | Facility: HOSPITAL | Age: 76
LOS: 1 days | Discharge: HOME | End: 2021-12-07
Payer: MEDICARE

## 2021-12-07 ENCOUNTER — APPOINTMENT (OUTPATIENT)
Dept: HYPERBARIC MEDICINE | Facility: HOSPITAL | Age: 76
End: 2021-12-07
Payer: MEDICARE

## 2021-12-07 VITALS
TEMPERATURE: 97.4 F | HEART RATE: 74 BPM | RESPIRATION RATE: 20 BRPM | SYSTOLIC BLOOD PRESSURE: 130 MMHG | DIASTOLIC BLOOD PRESSURE: 68 MMHG | OXYGEN SATURATION: 100 %

## 2021-12-07 VITALS
DIASTOLIC BLOOD PRESSURE: 86 MMHG | OXYGEN SATURATION: 100 % | SYSTOLIC BLOOD PRESSURE: 143 MMHG | HEART RATE: 71 BPM | RESPIRATION RATE: 18 BRPM | TEMPERATURE: 97.3 F

## 2021-12-07 DIAGNOSIS — E11.621 TYPE 2 DIABETES MELLITUS WITH FOOT ULCER: ICD-10-CM

## 2021-12-07 DIAGNOSIS — Z90.89 ACQUIRED ABSENCE OF OTHER ORGANS: Chronic | ICD-10-CM

## 2021-12-07 PROCEDURE — 82962 GLUCOSE BLOOD TEST: CPT

## 2021-12-07 PROCEDURE — G0277: CPT

## 2021-12-07 PROCEDURE — 99183 HYPERBARIC OXYGEN THERAPY: CPT

## 2021-12-07 NOTE — PROCEDURE
[Outpatient] : Outpatient [Ambulatory] : Patient is ambulatory. [THIS CHAMBER HAS BEEN CLEANED / DISINFECTED] : This chamber has been cleaned / disinfected according to local and hospital policy and procedure prior to this treatment. [Patient demonstrated and verbalized proper technique for using air break mask] : Patient demonstrated and verbalized proper technique for using air break mask [Patient educated on the risks of SMOKING prior to HBOT with understanding] : Patient educated on the risks of SMOKING prior to HBOT with understanding [Patient educated on the risks of CONSUMING ALCOHOL prior to HBOT with understanding] : Patient educated on the risks of CONSUMING ALCOHOL prior to HBOT with understanding [100% Cotton] : 100% cotton [Empty all pockets] : empty all pockets [No hair oils, wigs, hairpieces, pins] : no hair oils, wigs, hairpieces, pins  [Pre tx medications] : pre tx medications  [No make-up, creams] : no make-up, creams  [No jewelry] : no jewelry  [No matches, cigarettes, lighters] : no matches, cigarettes, lighters  [Hearing aid removed] : hearing aid removed [Dentures removed] : dentures removed [Ground bracelet on pt's wrist] : ground bracelet on pt's wrist  [Contacts removed] : contacts removed  [Remove nail polish] : remove nail polish  [No reading material] : no reading material  [Bra, undergarments removed] : bra, undergarments removed  [No contraindicated dressings] : no contraindicated dressings [Ground Wire - VISUAL Verification - Intact/Free of Obstruction] : Ground Wire - VISUAL Verification - Intact/Free of Obstruction  [Ground Continuity - Verified < 1 ohm w/ Wrist Strap Lamin] : Ground Continuity - Verified < 1 ohm w/ Wrist Strap Lamin [Number: ___] : Number: [unfilled] [Diagnosis: ___] : Diagnosis: [unfilled] [____] : Post-Dive: Time - [unfilled] [___] : Post-Dive: Value - [unfilled] mg/dL [Clear all fields] : clear all fields [] : No [FreeTextEntry3] : 90 [FreeTextEntry5] : 8:09 [FreeTextEntry7] : 8:18 [FreeTextEntry9] : 9:48 [de-identified] : 9:57 [de-identified] : 108 MIN

## 2021-12-07 NOTE — ADDENDUM
[FreeTextEntry1] : PT ARRIVED AMBULATORY FROM [facility]\par ALL VITALS WITHIN PARAMETERS FOR HBOT.\par NO DRAINAGE NOTED ON DRESSING PRIOR TO DESCENT. WOUND CARE TO FOLLOW HBOT.\par PT DESCENT TO RX TX DEPTH IN CHAMBER 2 WAS WITHOUT INCIDENT.\par PT RESTING AT TX DEPTH, CHEST RISE AND FALL OBSERVED.\par PT ASCENT WAS WITHOUT INCIDENT.\par PT RECEIVED WOUND CARE IN AN EXAM ROOM FOLLOWING HBOT.\par ASSESSMENT DATE 12/15/21 AND ARRIVAL TIME DISCUSSED WITH PT.\par \par CHCAMERON RUFFIN 12/03/21

## 2021-12-08 ENCOUNTER — APPOINTMENT (OUTPATIENT)
Dept: HYPERBARIC MEDICINE | Facility: HOSPITAL | Age: 76
End: 2021-12-08
Payer: MEDICARE

## 2021-12-08 ENCOUNTER — OUTPATIENT (OUTPATIENT)
Dept: OUTPATIENT SERVICES | Facility: HOSPITAL | Age: 76
LOS: 1 days | Discharge: HOME | End: 2021-12-08
Payer: MEDICARE

## 2021-12-08 VITALS
TEMPERATURE: 97.1 F | OXYGEN SATURATION: 100 % | DIASTOLIC BLOOD PRESSURE: 78 MMHG | RESPIRATION RATE: 20 BRPM | HEART RATE: 68 BPM | SYSTOLIC BLOOD PRESSURE: 136 MMHG

## 2021-12-08 VITALS
DIASTOLIC BLOOD PRESSURE: 75 MMHG | RESPIRATION RATE: 16 BRPM | OXYGEN SATURATION: 99 % | SYSTOLIC BLOOD PRESSURE: 128 MMHG | HEART RATE: 73 BPM | TEMPERATURE: 97.2 F
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
## 2021-12-08 DIAGNOSIS — T86.828 OTHER COMPLICATIONS OF SKIN GRAFT (ALLOGRAFT) (AUTOGRAFT): ICD-10-CM

## 2021-12-08 DIAGNOSIS — E11.621 TYPE 2 DIABETES MELLITUS WITH FOOT ULCER: ICD-10-CM

## 2021-12-08 DIAGNOSIS — Z90.89 ACQUIRED ABSENCE OF OTHER ORGANS: Chronic | ICD-10-CM

## 2021-12-08 DIAGNOSIS — L97.524 NON-PRESSURE CHRONIC ULCER OF OTHER PART OF LEFT FOOT WITH NECROSIS OF BONE: ICD-10-CM

## 2021-12-08 PROCEDURE — 99183 HYPERBARIC OXYGEN THERAPY: CPT

## 2021-12-08 PROCEDURE — G0277: CPT

## 2021-12-08 PROCEDURE — 82962 GLUCOSE BLOOD TEST: CPT

## 2021-12-08 NOTE — ADDENDUM
[FreeTextEntry1] : PT ARRIVED AMBULATORY A&OX3\par ALL VITALS WITHIN PARAMETERS FOR HBOT.\par NO DRAINAGE ON DRESSING PRIOR TO DESCENT.\par WOUND CARE TO FOLLOW HBOT.\par PT DESCENT TO RX X DEPTH IN CHAMBER 2 WAS WITHOUT INCIDENT.\par PT RESTING AT DEPTH, CHEST RISE AND FALL OBSERVED.\par PT ASCENT WAS WITHOUT INCIDENT. PT TOLERATED HBOT WELL.\par PT RECEIVED WOUND CARE IN AN EXAM ROOM.\par \par \par CHT ISAAC RUFFIN 12/08/21

## 2021-12-08 NOTE — PROCEDURE
[Outpatient] : Outpatient [Ambulatory] : Patient is ambulatory. [THIS CHAMBER HAS BEEN CLEANED / DISINFECTED] : This chamber has been cleaned / disinfected according to local and hospital policy and procedure prior to this treatment. [Patient demonstrated and verbalized proper technique for using air break mask] : Patient demonstrated and verbalized proper technique for using air break mask [Patient educated on the risks of SMOKING prior to HBOT with understanding] : Patient educated on the risks of SMOKING prior to HBOT with understanding [Patient educated on the risks of CONSUMING ALCOHOL prior to HBOT with understanding] : Patient educated on the risks of CONSUMING ALCOHOL prior to HBOT with understanding [100% Cotton] : 100% cotton [Empty all pockets] : empty all pockets [No hair oils, wigs, hairpieces, pins] : no hair oils, wigs, hairpieces, pins  [Pre tx medications] : pre tx medications  [No make-up, creams] : no make-up, creams  [No jewelry] : no jewelry  [No matches, cigarettes, lighters] : no matches, cigarettes, lighters  [Hearing aid removed] : hearing aid removed [Dentures removed] : dentures removed [Ground bracelet on pt's wrist] : ground bracelet on pt's wrist  [Contacts removed] : contacts removed  [Remove nail polish] : remove nail polish  [No reading material] : no reading material  [Bra, undergarments removed] : bra, undergarments removed  [No contraindicated dressings] : no contraindicated dressings [Ground Wire - VISUAL Verification - Intact/Free of Obstruction] : Ground Wire - VISUAL Verification - Intact/Free of Obstruction  [Ground Continuity - Verified < 1 ohm w/ Wrist Strap Lamin] : Ground Continuity - Verified < 1 ohm w/ Wrist Strap Lamin [Number: ___] : Number: [unfilled] [Diagnosis: ___] : Diagnosis: [unfilled] [____] : Post-Dive: Time - [unfilled] [___] : Post-Dive: Value - [unfilled] mg/dL [Clear all fields] : clear all fields [] : No [FreeTextEntry3] : 90 [FreeTextEntry5] : 5191 [FreeTextEntry7] : 5333 [FreeTextEntry9] : 0587 [de-identified] : 6045 [de-identified] : 108 MINUTES

## 2021-12-08 NOTE — ADDENDUM
[FreeTextEntry1] : PT ARRIVED A&OX3 \par ALL VITALS WITHIN PARAMETERS FOR HBOT\par PT DESCENDED TO 2.0 MABEL @ 1.75 PSI/MIN IN CHAMBER #2 WITHOUT INCIDENT\par PT RESTING AT TX DEPTH WITH VISIBLE CHEST RISE AND FALL OBSERVED CHAMBER SIDE \par PT ASCENDED FROM 2.0 MABEL @ 1.75 PSI/MIN WITHOUT INCIDENT\par PT TOLERATED TX WELL

## 2021-12-08 NOTE — PROCEDURE
[Outpatient] : Outpatient [Ambulatory] : Patient is ambulatory. [THIS CHAMBER HAS BEEN CLEANED / DISINFECTED] : This chamber has been cleaned / disinfected according to local and hospital policy and procedure prior to this treatment. [Patient demonstrated and verbalized proper technique for using air break mask] : Patient demonstrated and verbalized proper technique for using air break mask [Patient educated on the risks of SMOKING prior to HBOT with understanding] : Patient educated on the risks of SMOKING prior to HBOT with understanding [Patient educated on the risks of CONSUMING ALCOHOL prior to HBOT with understanding] : Patient educated on the risks of CONSUMING ALCOHOL prior to HBOT with understanding [100% Cotton] : 100% cotton [Empty all pockets] : empty all pockets [No hair oils, wigs, hairpieces, pins] : no hair oils, wigs, hairpieces, pins  [Pre tx medications] : pre tx medications  [No make-up, creams] : no make-up, creams  [No jewelry] : no jewelry  [No matches, cigarettes, lighters] : no matches, cigarettes, lighters  [Hearing aid removed] : hearing aid removed [Dentures removed] : dentures removed [Ground bracelet on pt's wrist] : ground bracelet on pt's wrist  [Contacts removed] : contacts removed  [Remove nail polish] : remove nail polish  [No reading material] : no reading material  [Bra, undergarments removed] : bra, undergarments removed  [No contraindicated dressings] : no contraindicated dressings [Ground Wire - VISUAL Verification - Intact/Free of Obstruction] : Ground Wire - VISUAL Verification - Intact/Free of Obstruction  [Ground Continuity - Verified < 1 ohm w/ Wrist Strap Lamin] : Ground Continuity - Verified < 1 ohm w/ Wrist Strap Lamin [Number: ___] : Number: [unfilled] [Diagnosis: ___] : Diagnosis: [unfilled] [____] : Post-Dive: Time - [unfilled] [___] : Post-Dive: Value - [unfilled] mg/dL [Clear all fields] : clear all fields [] : No [FreeTextEntry3] : 90 [FreeTextEntry5] : 8:04 [FreeTextEntry7] : 8:13 [FreeTextEntry9] : 9:43 [de-identified] : 9;52 [de-identified] : 108 min

## 2021-12-09 ENCOUNTER — APPOINTMENT (OUTPATIENT)
Dept: HYPERBARIC MEDICINE | Facility: HOSPITAL | Age: 76
End: 2021-12-09
Payer: MEDICARE

## 2021-12-09 ENCOUNTER — OUTPATIENT (OUTPATIENT)
Dept: OUTPATIENT SERVICES | Facility: HOSPITAL | Age: 76
LOS: 1 days | Discharge: HOME | End: 2021-12-09
Payer: MEDICARE

## 2021-12-09 VITALS
DIASTOLIC BLOOD PRESSURE: 61 MMHG | OXYGEN SATURATION: 98 % | RESPIRATION RATE: 20 BRPM | HEART RATE: 71 BPM | TEMPERATURE: 97.4 F | SYSTOLIC BLOOD PRESSURE: 104 MMHG

## 2021-12-09 VITALS
DIASTOLIC BLOOD PRESSURE: 67 MMHG | SYSTOLIC BLOOD PRESSURE: 128 MMHG | HEART RATE: 66 BPM | RESPIRATION RATE: 20 BRPM | TEMPERATURE: 97.1 F | OXYGEN SATURATION: 100 %

## 2021-12-09 DIAGNOSIS — T86.828 OTHER COMPLICATIONS OF SKIN GRAFT (ALLOGRAFT) (AUTOGRAFT): ICD-10-CM
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
## 2021-12-09 DIAGNOSIS — E11.621 TYPE 2 DIABETES MELLITUS WITH FOOT ULCER: ICD-10-CM

## 2021-12-09 DIAGNOSIS — Z90.89 ACQUIRED ABSENCE OF OTHER ORGANS: Chronic | ICD-10-CM

## 2021-12-09 DIAGNOSIS — L97.524 NON-PRESSURE CHRONIC ULCER OF OTHER PART OF LEFT FOOT WITH NECROSIS OF BONE: ICD-10-CM

## 2021-12-09 PROCEDURE — 99183 HYPERBARIC OXYGEN THERAPY: CPT

## 2021-12-09 PROCEDURE — 82962 GLUCOSE BLOOD TEST: CPT

## 2021-12-09 PROCEDURE — G0277: CPT

## 2021-12-09 NOTE — PROCEDURE
[Outpatient] : Outpatient [Ambulatory] : Patient is ambulatory. [THIS CHAMBER HAS BEEN CLEANED / DISINFECTED] : This chamber has been cleaned / disinfected according to local and hospital policy and procedure prior to this treatment. [Patient demonstrated and verbalized proper technique for using air break mask] : Patient demonstrated and verbalized proper technique for using air break mask [Patient educated on the risks of SMOKING prior to HBOT with understanding] : Patient educated on the risks of SMOKING prior to HBOT with understanding [Patient educated on the risks of CONSUMING ALCOHOL prior to HBOT with understanding] : Patient educated on the risks of CONSUMING ALCOHOL prior to HBOT with understanding [100% Cotton] : 100% cotton [Empty all pockets] : empty all pockets [No hair oils, wigs, hairpieces, pins] : no hair oils, wigs, hairpieces, pins  [Pre tx medications] : pre tx medications  [No make-up, creams] : no make-up, creams  [No jewelry] : no jewelry  [No matches, cigarettes, lighters] : no matches, cigarettes, lighters  [Hearing aid removed] : hearing aid removed [Dentures removed] : dentures removed [Ground bracelet on pt's wrist] : ground bracelet on pt's wrist  [Contacts removed] : contacts removed  [Remove nail polish] : remove nail polish  [No reading material] : no reading material  [Bra, undergarments removed] : bra, undergarments removed  [No contraindicated dressings] : no contraindicated dressings [Ground Wire - VISUAL Verification - Intact/Free of Obstruction] : Ground Wire - VISUAL Verification - Intact/Free of Obstruction  [Ground Continuity - Verified < 1 ohm w/ Wrist Strap Lamin] : Ground Continuity - Verified < 1 ohm w/ Wrist Strap Lamin [Number: ___] : Number: [unfilled] [Diagnosis: ___] : Diagnosis: [unfilled] [____] : Post-Dive: Time - [unfilled] [___] : Post-Dive: Value - [unfilled] mg/dL [Clear all fields] : clear all fields [] : No [FreeTextEntry3] : 90 [FreeTextEntry5] : 4265 [FreeTextEntry7] : 2228 [FreeTextEntry9] : 1507 [de-identified] : 0994 [de-identified] : 108mins

## 2021-12-10 ENCOUNTER — OUTPATIENT (OUTPATIENT)
Dept: OUTPATIENT SERVICES | Facility: HOSPITAL | Age: 76
LOS: 1 days | Discharge: HOME | End: 2021-12-10
Payer: MEDICARE

## 2021-12-10 ENCOUNTER — APPOINTMENT (OUTPATIENT)
Dept: HYPERBARIC MEDICINE | Facility: HOSPITAL | Age: 76
End: 2021-12-10
Payer: MEDICARE

## 2021-12-10 VITALS
TEMPERATURE: 97.2 F | HEART RATE: 71 BPM | DIASTOLIC BLOOD PRESSURE: 72 MMHG | OXYGEN SATURATION: 98 % | RESPIRATION RATE: 20 BRPM | SYSTOLIC BLOOD PRESSURE: 119 MMHG

## 2021-12-10 VITALS
OXYGEN SATURATION: 100 % | HEART RATE: 67 BPM | TEMPERATURE: 97.2 F | SYSTOLIC BLOOD PRESSURE: 118 MMHG | DIASTOLIC BLOOD PRESSURE: 65 MMHG | RESPIRATION RATE: 20 BRPM

## 2021-12-10 DIAGNOSIS — E11.621 TYPE 2 DIABETES MELLITUS WITH FOOT ULCER: ICD-10-CM

## 2021-12-10 DIAGNOSIS — Z90.89 ACQUIRED ABSENCE OF OTHER ORGANS: Chronic | ICD-10-CM

## 2021-12-10 PROCEDURE — G0277: CPT

## 2021-12-10 PROCEDURE — 99183 HYPERBARIC OXYGEN THERAPY: CPT

## 2021-12-10 PROCEDURE — 82962 GLUCOSE BLOOD TEST: CPT

## 2021-12-10 NOTE — PROCEDURE
[Outpatient] : Outpatient [Ambulatory] : Patient is ambulatory. [THIS CHAMBER HAS BEEN CLEANED / DISINFECTED] : This chamber has been cleaned / disinfected according to local and hospital policy and procedure prior to this treatment. [Patient demonstrated and verbalized proper technique for using air break mask] : Patient demonstrated and verbalized proper technique for using air break mask [Patient educated on the risks of SMOKING prior to HBOT with understanding] : Patient educated on the risks of SMOKING prior to HBOT with understanding [Patient educated on the risks of CONSUMING ALCOHOL prior to HBOT with understanding] : Patient educated on the risks of CONSUMING ALCOHOL prior to HBOT with understanding [100% Cotton] : 100% cotton [Empty all pockets] : empty all pockets [No hair oils, wigs, hairpieces, pins] : no hair oils, wigs, hairpieces, pins  [Pre tx medications] : pre tx medications  [No make-up, creams] : no make-up, creams  [No jewelry] : no jewelry  [No matches, cigarettes, lighters] : no matches, cigarettes, lighters  [Hearing aid removed] : hearing aid removed [Dentures removed] : dentures removed [Ground bracelet on pt's wrist] : ground bracelet on pt's wrist  [Contacts removed] : contacts removed  [Remove nail polish] : remove nail polish  [No reading material] : no reading material  [Bra, undergarments removed] : bra, undergarments removed  [No contraindicated dressings] : no contraindicated dressings [Ground Wire - VISUAL Verification - Intact/Free of Obstruction] : Ground Wire - VISUAL Verification - Intact/Free of Obstruction  [Ground Continuity - Verified < 1 ohm w/ Wrist Strap Lamin] : Ground Continuity - Verified < 1 ohm w/ Wrist Strap Lamin [Number: ___] : Number: [unfilled] [Diagnosis: ___] : Diagnosis: [unfilled] [____] : Post-Dive: Time - [unfilled] [___] : Post-Dive: Value - [unfilled] mg/dL [Clear all fields] : clear all fields [] : No [FreeTextEntry3] : 90 [FreeTextEntry5] : 8:05 [FreeTextEntry7] : 8:14 [FreeTextEntry9] : 9:44 [de-identified] : 9:53 [de-identified] : 108 MIN

## 2021-12-10 NOTE — ADDENDUM
[FreeTextEntry1] : PT ARRIVED AMBULATORY A&OX3\par ALL VITALS WITHIN PARAMETERS FOR HBOT\par NO DRAINAGE NOTED ON DRESSING.\par WOUND CARE TO FOLLOW HBOT.\par PT DESCENT TO RX TX DEPTH IN CHAMBER 2 WAS WITHOUT INCIDENT.\par PT RESTING AT DEPTH, CHEST RISE AND FALL OBSERVED.\par PT ASCENT WAS WITHOUT INCIDENT. PT TOLERATED HBOT WELL\par \par WOUND CARE PROVIDED IN AN EXAM ROOM.\par \par SUDHA BEVERLY 12/10/21

## 2021-12-11 DIAGNOSIS — T86.828 OTHER COMPLICATIONS OF SKIN GRAFT (ALLOGRAFT) (AUTOGRAFT): ICD-10-CM
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
## 2021-12-11 DIAGNOSIS — E11.621 TYPE 2 DIABETES MELLITUS WITH FOOT ULCER: ICD-10-CM

## 2021-12-11 DIAGNOSIS — L97.524 NON-PRESSURE CHRONIC ULCER OF OTHER PART OF LEFT FOOT WITH NECROSIS OF BONE: ICD-10-CM

## 2021-12-13 ENCOUNTER — OUTPATIENT (OUTPATIENT)
Dept: OUTPATIENT SERVICES | Facility: HOSPITAL | Age: 76
LOS: 1 days | Discharge: HOME | End: 2021-12-13
Payer: MEDICARE

## 2021-12-13 ENCOUNTER — APPOINTMENT (OUTPATIENT)
Dept: HYPERBARIC MEDICINE | Facility: HOSPITAL | Age: 76
End: 2021-12-13
Payer: MEDICARE

## 2021-12-13 VITALS
HEART RATE: 72 BPM | TEMPERATURE: 97.1 F | OXYGEN SATURATION: 99 % | DIASTOLIC BLOOD PRESSURE: 69 MMHG | RESPIRATION RATE: 20 BRPM | SYSTOLIC BLOOD PRESSURE: 127 MMHG

## 2021-12-13 VITALS
RESPIRATION RATE: 16 BRPM | OXYGEN SATURATION: 100 % | SYSTOLIC BLOOD PRESSURE: 125 MMHG | HEART RATE: 63 BPM | DIASTOLIC BLOOD PRESSURE: 67 MMHG | TEMPERATURE: 96.9 F

## 2021-12-13 DIAGNOSIS — E11.621 TYPE 2 DIABETES MELLITUS WITH FOOT ULCER: ICD-10-CM

## 2021-12-13 DIAGNOSIS — Z90.89 ACQUIRED ABSENCE OF OTHER ORGANS: Chronic | ICD-10-CM

## 2021-12-13 LAB — SARS-COV-2 RNA SPEC QL NAA+PROBE: SIGNIFICANT CHANGE UP

## 2021-12-13 PROCEDURE — 99183 HYPERBARIC OXYGEN THERAPY: CPT

## 2021-12-13 PROCEDURE — G0277: CPT

## 2021-12-13 PROCEDURE — 82962 GLUCOSE BLOOD TEST: CPT

## 2021-12-13 PROCEDURE — 87635 SARS-COV-2 COVID-19 AMP PRB: CPT

## 2021-12-13 NOTE — PROCEDURE
[Outpatient] : Outpatient [Ambulatory] : Patient is ambulatory. [THIS CHAMBER HAS BEEN CLEANED / DISINFECTED] : This chamber has been cleaned / disinfected according to local and hospital policy and procedure prior to this treatment. [Patient demonstrated and verbalized proper technique for using air break mask] : Patient demonstrated and verbalized proper technique for using air break mask [Patient educated on the risks of SMOKING prior to HBOT with understanding] : Patient educated on the risks of SMOKING prior to HBOT with understanding [Patient educated on the risks of CONSUMING ALCOHOL prior to HBOT with understanding] : Patient educated on the risks of CONSUMING ALCOHOL prior to HBOT with understanding [100% Cotton] : 100% cotton [Empty all pockets] : empty all pockets [No hair oils, wigs, hairpieces, pins] : no hair oils, wigs, hairpieces, pins  [Pre tx medications] : pre tx medications  [No make-up, creams] : no make-up, creams  [No jewelry] : no jewelry  [No matches, cigarettes, lighters] : no matches, cigarettes, lighters  [Hearing aid removed] : hearing aid removed [Dentures removed] : dentures removed [Ground bracelet on pt's wrist] : ground bracelet on pt's wrist  [Contacts removed] : contacts removed  [Remove nail polish] : remove nail polish  [No reading material] : no reading material  [Bra, undergarments removed] : bra, undergarments removed  [No contraindicated dressings] : no contraindicated dressings [Ground Wire - VISUAL Verification - Intact/Free of Obstruction] : Ground Wire - VISUAL Verification - Intact/Free of Obstruction  [Ground Continuity - Verified < 1 ohm w/ Wrist Strap Lamin] : Ground Continuity - Verified < 1 ohm w/ Wrist Strap Lamin [Number: ___] : Number: [unfilled] [Diagnosis: ___] : Diagnosis: [unfilled] [____] : Post-Dive: Time - [unfilled] [___] : Post-Dive: Value - [unfilled] mg/dL [Clear all fields] : clear all fields [] : No [FreeTextEntry3] : 90 [FreeTextEntry5] : 8:08 [FreeTextEntry7] : 8:17 [FreeTextEntry9] : 9:47 [de-identified] : 108 MIN [de-identified] : 9:56

## 2021-12-13 NOTE — ADDENDUM
[FreeTextEntry1] : PT ARRIVED AMBULATORY A&OX3.\par ALL VITALS WITHIN PARAMETERS FOR HBOT.\par NO DRAINAGE NOTED ON DRESSING PRIOR TO DESCENT.\par WOUND CARE TO FOLLOW HBOT.\par COVID SWAB OBTAINED PRE TX\par PT DESCENT TO RX TX DEPTH IN CHAMBER 2 WAS WITHOUT INCIDENT.\par PT RESTING AT DEPTH, CHEST RISE AND FALL OBSERVED.\par TRANSFER OF CARE TO .\par TRANSFER OF CARE RETURNED TO [facility] DURING ASCENT.\par PT ASCENT WAS WITHOUT INCIDENT.\par PT TOLERATED HBOT WELL.\par PT WAS REMINDED OF ASSESSMENT ON 12/15/21 AND ARRIVAL TIME ON THAT DATE.\par \par \par T ISAAC RUFFIN 12/13/21

## 2021-12-14 ENCOUNTER — OUTPATIENT (OUTPATIENT)
Dept: OUTPATIENT SERVICES | Facility: HOSPITAL | Age: 76
LOS: 1 days | Discharge: HOME | End: 2021-12-14
Payer: MEDICARE

## 2021-12-14 ENCOUNTER — APPOINTMENT (OUTPATIENT)
Dept: HYPERBARIC MEDICINE | Facility: HOSPITAL | Age: 76
End: 2021-12-14
Payer: MEDICARE

## 2021-12-14 VITALS
OXYGEN SATURATION: 99 % | DIASTOLIC BLOOD PRESSURE: 70 MMHG | TEMPERATURE: 97.1 F | RESPIRATION RATE: 18 BRPM | SYSTOLIC BLOOD PRESSURE: 123 MMHG | HEART RATE: 63 BPM

## 2021-12-14 VITALS
HEART RATE: 71 BPM | OXYGEN SATURATION: 99 % | SYSTOLIC BLOOD PRESSURE: 128 MMHG | RESPIRATION RATE: 18 BRPM | TEMPERATURE: 97.4 F | DIASTOLIC BLOOD PRESSURE: 73 MMHG

## 2021-12-14 DIAGNOSIS — E11.621 TYPE 2 DIABETES MELLITUS WITH FOOT ULCER: ICD-10-CM

## 2021-12-14 DIAGNOSIS — T86.828 OTHER COMPLICATIONS OF SKIN GRAFT (ALLOGRAFT) (AUTOGRAFT): ICD-10-CM

## 2021-12-14 DIAGNOSIS — L97.524 NON-PRESSURE CHRONIC ULCER OF OTHER PART OF LEFT FOOT WITH NECROSIS OF BONE: ICD-10-CM

## 2021-12-14 DIAGNOSIS — L97.424 NON-PRESSURE CHRONIC ULCER OF LEFT HEEL AND MIDFOOT WITH NECROSIS OF BONE: ICD-10-CM
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
## 2021-12-14 DIAGNOSIS — Z20.822 CONTACT WITH AND (SUSPECTED) EXPOSURE TO COVID-19: ICD-10-CM

## 2021-12-14 DIAGNOSIS — Z90.89 ACQUIRED ABSENCE OF OTHER ORGANS: Chronic | ICD-10-CM

## 2021-12-14 PROCEDURE — 82962 GLUCOSE BLOOD TEST: CPT

## 2021-12-14 PROCEDURE — G0277: CPT

## 2021-12-14 PROCEDURE — 99183 HYPERBARIC OXYGEN THERAPY: CPT

## 2021-12-14 NOTE — PROCEDURE
[Outpatient] : Outpatient [Ambulatory] : Patient is ambulatory. [THIS CHAMBER HAS BEEN CLEANED / DISINFECTED] : This chamber has been cleaned / disinfected according to local and hospital policy and procedure prior to this treatment. [Patient demonstrated and verbalized proper technique for using air break mask] : Patient demonstrated and verbalized proper technique for using air break mask [Patient educated on the risks of SMOKING prior to HBOT with understanding] : Patient educated on the risks of SMOKING prior to HBOT with understanding [Patient educated on the risks of CONSUMING ALCOHOL prior to HBOT with understanding] : Patient educated on the risks of CONSUMING ALCOHOL prior to HBOT with understanding [100% Cotton] : 100% cotton [Empty all pockets] : empty all pockets [No hair oils, wigs, hairpieces, pins] : no hair oils, wigs, hairpieces, pins  [Pre tx medications] : pre tx medications  [No make-up, creams] : no make-up, creams  [No jewelry] : no jewelry  [No matches, cigarettes, lighters] : no matches, cigarettes, lighters  [Hearing aid removed] : hearing aid removed [Dentures removed] : dentures removed [Ground bracelet on pt's wrist] : ground bracelet on pt's wrist  [Contacts removed] : contacts removed  [Remove nail polish] : remove nail polish  [No reading material] : no reading material  [Bra, undergarments removed] : bra, undergarments removed  [No contraindicated dressings] : no contraindicated dressings [Ground Wire - VISUAL Verification - Intact/Free of Obstruction] : Ground Wire - VISUAL Verification - Intact/Free of Obstruction  [Ground Continuity - Verified < 1 ohm w/ Wrist Strap Lamin] : Ground Continuity - Verified < 1 ohm w/ Wrist Strap Lamin [Number: ___] : Number: [unfilled] [Diagnosis: ___] : Diagnosis: [unfilled] [____] : Post-Dive: Time - [unfilled] [___] : Post-Dive: Value - [unfilled] mg/dL [Clear all fields] : clear all fields [] : No [FreeTextEntry5] : 8:04 [FreeTextEntry7] : 8:13 [FreeTextEntry9] : 2116 [de-identified] : 5563 [de-identified] : 108mins

## 2021-12-14 NOTE — ADDENDUM
[FreeTextEntry1] : Pt descended to 2.0 MABEL @ 1.75 PSI/MIN without incident in chamber #2.\par Pt resting comfortably @ depth, with equal chest rise observed throughout tx.\par Pt ascended from 2.0 MABEL @ 1.75 PSI/MIN without incident in chamber #2.\par Pt tolerated treatment well.\par \par WC done post tx.

## 2021-12-15 ENCOUNTER — OUTPATIENT (OUTPATIENT)
Dept: OUTPATIENT SERVICES | Facility: HOSPITAL | Age: 76
LOS: 1 days | Discharge: HOME | End: 2021-12-15
Payer: MEDICARE

## 2021-12-15 ENCOUNTER — APPOINTMENT (OUTPATIENT)
Dept: WOUND CARE | Facility: HOSPITAL | Age: 76
End: 2021-12-15

## 2021-12-15 VITALS
TEMPERATURE: 97.5 F | DIASTOLIC BLOOD PRESSURE: 62 MMHG | RESPIRATION RATE: 18 BRPM | HEART RATE: 69 BPM | WEIGHT: 230 LBS | SYSTOLIC BLOOD PRESSURE: 119 MMHG | HEIGHT: 72 IN | BODY MASS INDEX: 31.15 KG/M2 | OXYGEN SATURATION: 97 %

## 2021-12-15 DIAGNOSIS — Z90.89 ACQUIRED ABSENCE OF OTHER ORGANS: Chronic | ICD-10-CM

## 2021-12-15 DIAGNOSIS — E11.621 TYPE 2 DIABETES MELLITUS WITH FOOT ULCER: ICD-10-CM

## 2021-12-15 PROCEDURE — G0463: CPT

## 2021-12-15 RX ORDER — METFORMIN ER 750 MG 750 MG/1
750 TABLET ORAL DAILY
Refills: 0 | Status: ACTIVE | COMMUNITY

## 2021-12-15 NOTE — ASSESSMENT
[Verbal] : Verbal [Written] : Written [Demo] : Demo [Patient] : Patient [Good - alert, interested, motivated] : Good - alert, interested, motivated [Verbalizes knowledge/Understanding] : Verbalizes knowledge/understanding [Dressing changes] : dressing changes [Foot Care] : foot care [Skin Care] : skin care [Signs and symptoms of infection] : sign and symptoms of infection [Nutrition] : nutrition [How and When to Call] : how and when to call [Pain Management] : pain management [Hyperbaric Therapy] : hyperbaric therapy [Off-loading] : off-loading [Patient responsibility to plan of care] : patient responsibility to plan of care [Glycemic Control] : glycemic control [Stable] : stable [Home] : Home [Ambulatory] : Ambulatory [Not Applicable - Long Term Care/Home Health Agency] : Long Term Care/Home Health Agency: Not Applicable [] : No [FreeTextEntry2] : Infection prevention\par Localized wound care \par Goal remaining pain free regarding wounds\par Hyperbaric oxygen therapy  [FreeTextEntry4] : 24/30 HBOT completed \par Continue HBO as prescribed. \par Assessment in 2 weeks \par \par * CAMERAS UNAVAILABLE DUE TO CONNECTION ISSUE

## 2021-12-15 NOTE — HISTORY OF PRESENT ILLNESS
[FreeTextEntry1] : s/p distal amp to hallux and 2nd toe left foot closed , callus distal left 3rd toe which needs monitoring

## 2021-12-15 NOTE — REVIEW OF SYSTEMS
[Fever] : no fever [Chills] : no chills [Eye Pain] : no eye pain [Loss Of Hearing] : no hearing loss [Shortness Of Breath] : no shortness of breath [Wheezing] : no wheezing [Abdominal Pain] : no abdominal pain [Joint Stiffness] : joint stiffness [Skin Wound] : no skin wound [Anxiety] : no anxiety [Easy Bleeding] : no tendency for easy bleeding [Easy Bruising] : no tendency for easy bruising [FreeTextEntry5] : HLD, HTN [de-identified] : all amp sites left foot closed  [de-identified] : NIDDM with neuropathy  [de-identified] : NIDDM

## 2021-12-15 NOTE — PROCEDURE
[Outpatient] : Outpatient [Ambulatory] : Patient is ambulatory. [THIS CHAMBER HAS BEEN CLEANED / DISINFECTED] : This chamber has been cleaned / disinfected according to local and hospital policy and procedure prior to this treatment. [Patient demonstrated and verbalized proper technique for using air break mask] : Patient demonstrated and verbalized proper technique for using air break mask [Patient educated on the risks of SMOKING prior to HBOT with understanding] : Patient educated on the risks of SMOKING prior to HBOT with understanding [Patient educated on the risks of CONSUMING ALCOHOL prior to HBOT with understanding] : Patient educated on the risks of CONSUMING ALCOHOL prior to HBOT with understanding [100% Cotton] : 100% cotton [Empty all pockets] : empty all pockets [No hair oils, wigs, hairpieces, pins] : no hair oils, wigs, hairpieces, pins  [Pre tx medications] : pre tx medications  [No make-up, creams] : no make-up, creams  [No jewelry] : no jewelry  [No matches, cigarettes, lighters] : no matches, cigarettes, lighters  [Hearing aid removed] : hearing aid removed [Dentures removed] : dentures removed [Ground bracelet on pt's wrist] : ground bracelet on pt's wrist  [Contacts removed] : contacts removed  [Remove nail polish] : remove nail polish  [No reading material] : no reading material  [Bra, undergarments removed] : bra, undergarments removed  [No contraindicated dressings] : no contraindicated dressings [Ground Wire - VISUAL Verification - Intact/Free of Obstruction] : Ground Wire - VISUAL Verification - Intact/Free of Obstruction  [Ground Continuity - Verified < 1 ohm w/ Wrist Strap Lamin] : Ground Continuity - Verified < 1 ohm w/ Wrist Strap Lamin [Number: ___] : Number: [unfilled] [Diagnosis: ___] : Diagnosis: [unfilled] [____] : Post-Dive: Time - [unfilled] [___] : Post-Dive: Value - [unfilled] mg/dL [Clear all fields] : clear all fields [] : No [FreeTextEntry3] : 90 [FreeTextEntry5] : 0527 [FreeTextEntry7] : 8780 [FreeTextEntry9] : 4084 [de-identified] : 0976 [de-identified] : 108 MIN

## 2021-12-15 NOTE — PLAN
[FreeTextEntry1] : Finish HBOT , protective dressing , observation  Spent 20 minutes for patient care and medical decision making.\par

## 2021-12-15 NOTE — PHYSICAL EXAM
[4 x 4] : 4 x 4  [2+] : left 2+ [Ankle Swelling (On Exam)] : not present [Varicose Veins Of Lower Extremities] : not present [] : not present [Skin Ulcer] : no ulcer [Alert] : alert [Oriented to Person] : oriented to person [Oriented to Place] : oriented to place [Calm] : calm [de-identified] : A&Ox3, NAD [de-identified] : HTN, HLD  [de-identified] : 5/5 strength in all quadrants bilaterally [de-identified] : loss of protective sensation bilaterally , NIDDM with neuropathy  [de-identified] :  left 2nd digit distal amp healed , no soi  [FreeTextEntry1] : Left foot 2nd distal amp site - Closed ( Previously wound # 2 )  [de-identified] : Dry dressing  [de-identified] : Cleansed with NS\par Paper tape  [FreeTextEntry7] : Left foot distal 3rd digit  [FreeTextEntry8] : 0.5 [FreeTextEntry9] : 0.5 [de-identified] : 0.1 [de-identified] : Serous/sanguinous [de-identified] : Callus  [de-identified] : Silver alginate [de-identified] : Cleansed with NS\par Paper tape  [de-identified] : 3x Weekly [de-identified] : Secondary Dressing [de-identified] : Small [de-identified] : other [de-identified] : None [de-identified] : None [de-identified] : 100% [de-identified] : No [de-identified] : 3x Weekly [de-identified] : Primary Dressing

## 2021-12-16 ENCOUNTER — OUTPATIENT (OUTPATIENT)
Dept: OUTPATIENT SERVICES | Facility: HOSPITAL | Age: 76
LOS: 1 days | Discharge: HOME | End: 2021-12-16
Payer: MEDICARE

## 2021-12-16 ENCOUNTER — APPOINTMENT (OUTPATIENT)
Dept: HYPERBARIC MEDICINE | Facility: HOSPITAL | Age: 76
End: 2021-12-16
Payer: MEDICARE

## 2021-12-16 VITALS
RESPIRATION RATE: 18 BRPM | SYSTOLIC BLOOD PRESSURE: 118 MMHG | HEART RATE: 76 BPM | TEMPERATURE: 97.1 F | DIASTOLIC BLOOD PRESSURE: 76 MMHG | OXYGEN SATURATION: 100 %

## 2021-12-16 VITALS
SYSTOLIC BLOOD PRESSURE: 128 MMHG | TEMPERATURE: 97.5 F | HEART RATE: 72 BPM | DIASTOLIC BLOOD PRESSURE: 74 MMHG | RESPIRATION RATE: 20 BRPM | OXYGEN SATURATION: 99 %

## 2021-12-16 DIAGNOSIS — T86.828 OTHER COMPLICATIONS OF SKIN GRAFT (ALLOGRAFT) (AUTOGRAFT): ICD-10-CM
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
## 2021-12-16 DIAGNOSIS — L84 CORNS AND CALLOSITIES: ICD-10-CM

## 2021-12-16 DIAGNOSIS — I10 ESSENTIAL (PRIMARY) HYPERTENSION: ICD-10-CM

## 2021-12-16 DIAGNOSIS — E88.81 METABOLIC SYNDROME AND OTHER INSULIN RESISTANCE: ICD-10-CM

## 2021-12-16 DIAGNOSIS — E78.2 MIXED HYPERLIPIDEMIA: ICD-10-CM

## 2021-12-16 DIAGNOSIS — Z87.891 PERSONAL HISTORY OF NICOTINE DEPENDENCE: ICD-10-CM

## 2021-12-16 DIAGNOSIS — Z79.84 LONG TERM (CURRENT) USE OF ORAL HYPOGLYCEMIC DRUGS: ICD-10-CM

## 2021-12-16 DIAGNOSIS — Z98.890 OTHER SPECIFIED POSTPROCEDURAL STATES: ICD-10-CM

## 2021-12-16 DIAGNOSIS — Z79.899 OTHER LONG TERM (CURRENT) DRUG THERAPY: ICD-10-CM

## 2021-12-16 DIAGNOSIS — L97.524 NON-PRESSURE CHRONIC ULCER OF OTHER PART OF LEFT FOOT WITH NECROSIS OF BONE: ICD-10-CM

## 2021-12-16 DIAGNOSIS — E11.40 TYPE 2 DIABETES MELLITUS WITH DIABETIC NEUROPATHY, UNSPECIFIED: ICD-10-CM

## 2021-12-16 DIAGNOSIS — E11.621 TYPE 2 DIABETES MELLITUS WITH FOOT ULCER: ICD-10-CM

## 2021-12-16 DIAGNOSIS — Z89.412 ACQUIRED ABSENCE OF LEFT GREAT TOE: ICD-10-CM

## 2021-12-16 DIAGNOSIS — N28.9 DISORDER OF KIDNEY AND URETER, UNSPECIFIED: ICD-10-CM

## 2021-12-16 DIAGNOSIS — Z89.422 ACQUIRED ABSENCE OF OTHER LEFT TOE(S): ICD-10-CM

## 2021-12-16 DIAGNOSIS — Z90.89 ACQUIRED ABSENCE OF OTHER ORGANS: Chronic | ICD-10-CM

## 2021-12-16 DIAGNOSIS — Z79.82 LONG TERM (CURRENT) USE OF ASPIRIN: ICD-10-CM

## 2021-12-16 PROCEDURE — 82962 GLUCOSE BLOOD TEST: CPT

## 2021-12-16 PROCEDURE — G0277: CPT

## 2021-12-16 PROCEDURE — 99183 HYPERBARIC OXYGEN THERAPY: CPT

## 2021-12-17 ENCOUNTER — APPOINTMENT (OUTPATIENT)
Dept: WOUND CARE | Facility: HOSPITAL | Age: 76
End: 2021-12-17

## 2021-12-17 ENCOUNTER — OUTPATIENT (OUTPATIENT)
Dept: OUTPATIENT SERVICES | Facility: HOSPITAL | Age: 76
LOS: 1 days | Discharge: HOME | End: 2021-12-17
Payer: MEDICARE

## 2021-12-17 VITALS
HEART RATE: 69 BPM | DIASTOLIC BLOOD PRESSURE: 62 MMHG | OXYGEN SATURATION: 97 % | RESPIRATION RATE: 16 BRPM | TEMPERATURE: 97.3 F | SYSTOLIC BLOOD PRESSURE: 115 MMHG

## 2021-12-17 VITALS
SYSTOLIC BLOOD PRESSURE: 124 MMHG | DIASTOLIC BLOOD PRESSURE: 67 MMHG | OXYGEN SATURATION: 99 % | HEART RATE: 60 BPM | TEMPERATURE: 97.2 F | RESPIRATION RATE: 20 BRPM

## 2021-12-17 DIAGNOSIS — E11.621 TYPE 2 DIABETES MELLITUS WITH FOOT ULCER: ICD-10-CM

## 2021-12-17 DIAGNOSIS — Z90.89 ACQUIRED ABSENCE OF OTHER ORGANS: Chronic | ICD-10-CM

## 2021-12-17 PROCEDURE — G0277: CPT

## 2021-12-17 PROCEDURE — 82962 GLUCOSE BLOOD TEST: CPT

## 2021-12-17 NOTE — ADDENDUM
[FreeTextEntry1] : PT ARRIVED AMBULATORY A&OX3.\par ALL VITALS WITHIN PARAMETERS FOR HBOT.\par NO DRAINAGE NOTED ON DRESSING.  PER PT, DPM REQUEST TO SEE PT FOLLOWING HBOT\par PT DESCENT TO RX TX DEPTH IN CHAMBER 2 WAS WITHOUT INCIDENT.\par PT RESTING AT DEPTH, CHEST RISE AND FALL OBSERVED.\par WOUND CARE TO FOLLOW HBOT.\par PT ASCENT WAS WITHOUT INCIDENT\par PT TOLERATED TX WELL.

## 2021-12-17 NOTE — ADDENDUM
[FreeTextEntry1] : PT ARRIVED AMBULATORY A&OX3.\par ALL VITALS WITHIN PARAMETERS FOR HBOT.\par PT REPORTS CHANGING HIS OWN DRESSING AFTER SHOWERING.\par PT DESCENT TO RX TX DEPTH IN CHAMBER 2 WAS WITHOUT INCIDENT.\par PT RESTING AT DEPTH, CHEST RISE AND FALL OBSERVED.\par PT ASCENT WAS WITHOUT INCIDENT. PT TOLERATED HBOT WELL.\par PT REQUEST AND WAS PROVIDED AN 8:00 CHAMBER TIME FOR HBOT ON 12/18/21. PT WAS ADVISED TO ARRIVE AT 7:30 ON THAT DATE.\par \par \par CHCAMERON RUFFIN 12/17/21

## 2021-12-17 NOTE — PROCEDURE
[Outpatient] : Outpatient [Ambulatory] : Patient is ambulatory. [THIS CHAMBER HAS BEEN CLEANED / DISINFECTED] : This chamber has been cleaned / disinfected according to local and hospital policy and procedure prior to this treatment. [Patient demonstrated and verbalized proper technique for using air break mask] : Patient demonstrated and verbalized proper technique for using air break mask [Patient educated on the risks of SMOKING prior to HBOT with understanding] : Patient educated on the risks of SMOKING prior to HBOT with understanding [Patient educated on the risks of CONSUMING ALCOHOL prior to HBOT with understanding] : Patient educated on the risks of CONSUMING ALCOHOL prior to HBOT with understanding [100% Cotton] : 100% cotton [Empty all pockets] : empty all pockets [No hair oils, wigs, hairpieces, pins] : no hair oils, wigs, hairpieces, pins  [Pre tx medications] : pre tx medications  [No make-up, creams] : no make-up, creams  [No jewelry] : no jewelry  [No matches, cigarettes, lighters] : no matches, cigarettes, lighters  [Hearing aid removed] : hearing aid removed [Dentures removed] : dentures removed [Ground bracelet on pt's wrist] : ground bracelet on pt's wrist  [Contacts removed] : contacts removed  [Remove nail polish] : remove nail polish  [No reading material] : no reading material  [Bra, undergarments removed] : bra, undergarments removed  [No contraindicated dressings] : no contraindicated dressings [Ground Wire - VISUAL Verification - Intact/Free of Obstruction] : Ground Wire - VISUAL Verification - Intact/Free of Obstruction  [Ground Continuity - Verified < 1 ohm w/ Wrist Strap Lamin] : Ground Continuity - Verified < 1 ohm w/ Wrist Strap Lamin [Number: ___] : Number: [unfilled] [Diagnosis: ___] : Diagnosis: [unfilled] [____] : Post-Dive: Time - [unfilled] [___] : Post-Dive: Value - [unfilled] mg/dL [Clear all fields] : clear all fields [] : No [FreeTextEntry3] : 90 [FreeTextEntry5] : 8:08 [FreeTextEntry7] : 8:17 [FreeTextEntry9] : 9:47 [de-identified] : 9:56 [de-identified] : 108 MINUTES

## 2021-12-18 ENCOUNTER — APPOINTMENT (OUTPATIENT)
Dept: HYPERBARIC MEDICINE | Facility: HOSPITAL | Age: 76
End: 2021-12-18
Payer: MEDICARE

## 2021-12-18 ENCOUNTER — OUTPATIENT (OUTPATIENT)
Dept: OUTPATIENT SERVICES | Facility: HOSPITAL | Age: 76
LOS: 1 days | Discharge: HOME | End: 2021-12-18
Payer: MEDICARE

## 2021-12-18 VITALS
DIASTOLIC BLOOD PRESSURE: 66 MMHG | SYSTOLIC BLOOD PRESSURE: 108 MMHG | HEART RATE: 88 BPM | TEMPERATURE: 98.1 F | RESPIRATION RATE: 16 BRPM | OXYGEN SATURATION: 98 %

## 2021-12-18 VITALS
OXYGEN SATURATION: 99 % | RESPIRATION RATE: 18 BRPM | HEART RATE: 78 BPM | DIASTOLIC BLOOD PRESSURE: 68 MMHG | TEMPERATURE: 98.5 F | SYSTOLIC BLOOD PRESSURE: 126 MMHG

## 2021-12-18 DIAGNOSIS — T86.828 OTHER COMPLICATIONS OF SKIN GRAFT (ALLOGRAFT) (AUTOGRAFT): ICD-10-CM

## 2021-12-18 DIAGNOSIS — L97.524 NON-PRESSURE CHRONIC ULCER OF OTHER PART OF LEFT FOOT WITH NECROSIS OF BONE: ICD-10-CM
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
## 2021-12-18 DIAGNOSIS — E11.621 TYPE 2 DIABETES MELLITUS WITH FOOT ULCER: ICD-10-CM

## 2021-12-18 DIAGNOSIS — Z90.89 ACQUIRED ABSENCE OF OTHER ORGANS: Chronic | ICD-10-CM

## 2021-12-18 PROCEDURE — 99183 HYPERBARIC OXYGEN THERAPY: CPT

## 2021-12-18 PROCEDURE — G0277: CPT

## 2021-12-18 PROCEDURE — 82962 GLUCOSE BLOOD TEST: CPT

## 2021-12-18 NOTE — ADDENDUM
[FreeTextEntry1] : Pt descended to 2.4 MABEL @ 2.2 PSI/min without incident in chamber #1\par Pt resting @ depth with chest rise and fall observed throughout tx. \par Pt tolerated air breaks well. \par Pt ascended from 2.4 MABEL @ 2.2 PSI/min without incident. \par Pt tolerated tx well.\par \par

## 2021-12-18 NOTE — ASSESSMENT
[No change from previous assessment] : No change from previous assessment [Patient descended without problem for 9 minutes] : Patient descended without problem for 9 minutes [No dizziness or thirst] :  No dizziness or thirst [Vital signs stable] : Vital signs stable [No ear problems] : No ear problems [Tolerating dive well] : Tolerating dive well [No Chest Pain, shortness of breath] : No Chest Pain, shortness of breath [Respiratory Rate Stable] : Respiratory Rate Stable [No chest pain, shortness of breath, or ear pain] :  No chest pain, shortness of breath, or ear pain  [Tolerated Ascent well] : Tolerated Ascent well [Vital Signs stable] : Vital Signs stable [A physician was present throughout the entire HBOT] : A physician was present throughout the entire HBOT [No] : No [Clinically Stable] : Clinically stable [Continue Treatment Plan] : Continue treatment plan [0] : 0 out of 10 [Patient prepared for dive] : Patient prepared for dive

## 2021-12-18 NOTE — PROCEDURE
[Outpatient] : Outpatient [Ambulatory] : Patient is ambulatory. [THIS CHAMBER HAS BEEN CLEANED / DISINFECTED] : This chamber has been cleaned / disinfected according to local and hospital policy and procedure prior to this treatment. [____] : Post-Dive: Time - [unfilled] [___] : Post-Dive: Value - [unfilled] mg/dL [Patient demonstrated and verbalized proper technique for using air break mask] : Patient demonstrated and verbalized proper technique for using air break mask [Patient educated on the risks of SMOKING prior to HBOT with understanding] : Patient educated on the risks of SMOKING prior to HBOT with understanding [Patient educated on the risks of CONSUMING ALCOHOL prior to HBOT with understanding] : Patient educated on the risks of CONSUMING ALCOHOL prior to HBOT with understanding [100% Cotton] : 100% cotton [Empty all pockets] : empty all pockets [No hair oils, wigs, hairpieces, pins] : no hair oils, wigs, hairpieces, pins  [Pre tx medications] : pre tx medications  [No make-up, creams] : no make-up, creams  [No jewelry] : no jewelry  [No matches, cigarettes, lighters] : no matches, cigarettes, lighters  [Hearing aid removed] : hearing aid removed [Dentures removed] : dentures removed [Ground bracelet on pt's wrist] : ground bracelet on pt's wrist  [Contacts removed] : contacts removed  [Remove nail polish] : remove nail polish  [No reading material] : no reading material  [Bra, undergarments removed] : bra, undergarments removed  [No contraindicated dressings] : no contraindicated dressings [Ground Wire - VISUAL Verification - Intact/Free of Obstruction] : Ground Wire - VISUAL Verification - Intact/Free of Obstruction  [Ground Continuity - Verified < 1 ohm w/ Wrist Strap Lamin] : Ground Continuity - Verified < 1 ohm w/ Wrist Strap Lamin [Diagnosis: ___] : Diagnosis: [unfilled] [Number: ___] : Number: [unfilled] [Clear all fields] : clear all fields [] : No [FreeTextEntry3] : 90 [FreeTextEntry5] : 6281 [FreeTextEntry7] : 4100 [FreeTextEntry9] : 4978 [de-identified] : 0917 [de-identified] : 108 MIN

## 2021-12-20 ENCOUNTER — APPOINTMENT (OUTPATIENT)
Dept: HYPERBARIC MEDICINE | Facility: HOSPITAL | Age: 76
End: 2021-12-20
Payer: MEDICARE

## 2021-12-20 ENCOUNTER — OUTPATIENT (OUTPATIENT)
Dept: OUTPATIENT SERVICES | Facility: HOSPITAL | Age: 76
LOS: 1 days | Discharge: HOME | End: 2021-12-20
Payer: MEDICARE

## 2021-12-20 VITALS
DIASTOLIC BLOOD PRESSURE: 66 MMHG | SYSTOLIC BLOOD PRESSURE: 129 MMHG | TEMPERATURE: 97 F | HEART RATE: 63 BPM | RESPIRATION RATE: 20 BRPM | OXYGEN SATURATION: 20 %

## 2021-12-20 VITALS
RESPIRATION RATE: 20 BRPM | HEART RATE: 72 BPM | TEMPERATURE: 97.1 F | OXYGEN SATURATION: 99 % | SYSTOLIC BLOOD PRESSURE: 133 MMHG | DIASTOLIC BLOOD PRESSURE: 73 MMHG

## 2021-12-20 DIAGNOSIS — L97.524 NON-PRESSURE CHRONIC ULCER OF OTHER PART OF LEFT FOOT WITH NECROSIS OF BONE: ICD-10-CM

## 2021-12-20 DIAGNOSIS — E11.621 TYPE 2 DIABETES MELLITUS WITH FOOT ULCER: ICD-10-CM

## 2021-12-20 DIAGNOSIS — T86.828 OTHER COMPLICATIONS OF SKIN GRAFT (ALLOGRAFT) (AUTOGRAFT): ICD-10-CM

## 2021-12-20 DIAGNOSIS — Z90.89 ACQUIRED ABSENCE OF OTHER ORGANS: Chronic | ICD-10-CM
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
## 2021-12-20 LAB — SARS-COV-2 RNA SPEC QL NAA+PROBE: SIGNIFICANT CHANGE UP

## 2021-12-20 PROCEDURE — U0003: CPT

## 2021-12-20 PROCEDURE — G0277: CPT

## 2021-12-20 PROCEDURE — 82962 GLUCOSE BLOOD TEST: CPT

## 2021-12-20 PROCEDURE — 99183 HYPERBARIC OXYGEN THERAPY: CPT

## 2021-12-20 PROCEDURE — U0005: CPT

## 2021-12-20 NOTE — PROCEDURE
[Outpatient] : Outpatient [Ambulatory] : Patient is ambulatory. [THIS CHAMBER HAS BEEN CLEANED / DISINFECTED] : This chamber has been cleaned / disinfected according to local and hospital policy and procedure prior to this treatment. [100% Cotton] : 100% cotton [Empty all pockets] : empty all pockets [No hair oils, wigs, hairpieces, pins] : no hair oils, wigs, hairpieces, pins  [Pre tx medications] : pre tx medications  [No make-up, creams] : no make-up, creams  [No jewelry] : no jewelry  [No matches, cigarettes, lighters] : no matches, cigarettes, lighters  [Hearing aid removed] : hearing aid removed [Dentures removed] : dentures removed [Ground bracelet on pt's wrist] : ground bracelet on pt's wrist  [Contacts removed] : contacts removed  [Remove nail polish] : remove nail polish  [No reading material] : no reading material  [Bra, undergarments removed] : bra, undergarments removed  [No contraindicated dressings] : no contraindicated dressings [Ground Wire - VISUAL Verification - Intact/Free of Obstruction] : Ground Wire - VISUAL Verification - Intact/Free of Obstruction  [Ground Continuity - Verified < 1 ohm w/ Wrist Strap Lamin] : Ground Continuity - Verified < 1 ohm w/ Wrist Strap Lamin [Number: ___] : Number: [unfilled] [Diagnosis: ___] : Diagnosis: [unfilled] [Patient demonstrated and verbalized proper technique for using air break mask] : Patient demonstrated and verbalized proper technique for using air break mask [Patient educated on the risks of SMOKING prior to HBOT with understanding] : Patient educated on the risks of SMOKING prior to HBOT with understanding [Patient educated on the risks of CONSUMING ALCOHOL prior to HBOT with understanding] : Patient educated on the risks of CONSUMING ALCOHOL prior to HBOT with understanding [____] : Post-Dive: Time - [unfilled] [___] : Post-Dive: Value - [unfilled] mg/dL [Clear all fields] : clear all fields [] : No [FreeTextEntry3] : 90 [FreeTextEntry5] : 8:16 [FreeTextEntry7] : 8:25 [FreeTextEntry9] : 9:55 [de-identified] : 10:04 [de-identified] : 108 min

## 2021-12-20 NOTE — ADDENDUM
[FreeTextEntry1] : PT ARRIVED AMBULATORY A&OX3.\par ALL VITALS WITHIN PARAMETERS FOR HBOT.\par PT DESCENT TO RX TX DEPTH IN CHAMBER 2 WAS WITHOUT INCIDENT.\par PT RESTING AT DEPTH, CHEST RISE AND FALL OBSERVED.\par COVID SWAB TO BE OBTAINED POST HBOT\par OBSERVATION TRANSFERRED TO .\par CARE RETURNED TO [facility] FOR POST VITALS AND COVID SWAB\par PT ASCENT WAS WITHOUT INCIDENT.\par PT TOLERATED HBOT WELL.\par \par T ISAAC RUFFIN 12/20/21

## 2021-12-21 ENCOUNTER — OUTPATIENT (OUTPATIENT)
Dept: OUTPATIENT SERVICES | Facility: HOSPITAL | Age: 76
LOS: 1 days | Discharge: HOME | End: 2021-12-21
Payer: MEDICARE

## 2021-12-21 ENCOUNTER — APPOINTMENT (OUTPATIENT)
Dept: HYPERBARIC MEDICINE | Facility: HOSPITAL | Age: 76
End: 2021-12-21
Payer: MEDICARE

## 2021-12-21 ENCOUNTER — NON-APPOINTMENT (OUTPATIENT)
Age: 76
End: 2021-12-21

## 2021-12-21 VITALS
SYSTOLIC BLOOD PRESSURE: 134 MMHG | TEMPERATURE: 97.4 F | DIASTOLIC BLOOD PRESSURE: 74 MMHG | RESPIRATION RATE: 18 BRPM | HEART RATE: 62 BPM | OXYGEN SATURATION: 100 %

## 2021-12-21 VITALS
HEART RATE: 72 BPM | RESPIRATION RATE: 18 BRPM | TEMPERATURE: 97.3 F | DIASTOLIC BLOOD PRESSURE: 64 MMHG | SYSTOLIC BLOOD PRESSURE: 119 MMHG | OXYGEN SATURATION: 97 %

## 2021-12-21 DIAGNOSIS — E11.621 TYPE 2 DIABETES MELLITUS WITH FOOT ULCER: ICD-10-CM

## 2021-12-21 DIAGNOSIS — T86.828 OTHER COMPLICATIONS OF SKIN GRAFT (ALLOGRAFT) (AUTOGRAFT): ICD-10-CM

## 2021-12-21 DIAGNOSIS — Z90.89 ACQUIRED ABSENCE OF OTHER ORGANS: Chronic | ICD-10-CM
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
## 2021-12-21 DIAGNOSIS — L97.524 NON-PRESSURE CHRONIC ULCER OF OTHER PART OF LEFT FOOT WITH NECROSIS OF BONE: ICD-10-CM

## 2021-12-21 DIAGNOSIS — Z20.822 CONTACT WITH AND (SUSPECTED) EXPOSURE TO COVID-19: ICD-10-CM

## 2021-12-21 PROCEDURE — 99183 HYPERBARIC OXYGEN THERAPY: CPT

## 2021-12-21 PROCEDURE — G0277: CPT

## 2021-12-21 PROCEDURE — 82962 GLUCOSE BLOOD TEST: CPT

## 2021-12-21 NOTE — PROCEDURE
[Outpatient] : Outpatient [Ambulatory] : Patient is ambulatory. [THIS CHAMBER HAS BEEN CLEANED / DISINFECTED] : This chamber has been cleaned / disinfected according to local and hospital policy and procedure prior to this treatment. [Patient demonstrated and verbalized proper technique for using air break mask] : Patient demonstrated and verbalized proper technique for using air break mask [Patient educated on the risks of SMOKING prior to HBOT with understanding] : Patient educated on the risks of SMOKING prior to HBOT with understanding [Patient educated on the risks of CONSUMING ALCOHOL prior to HBOT with understanding] : Patient educated on the risks of CONSUMING ALCOHOL prior to HBOT with understanding [100% Cotton] : 100% cotton [Empty all pockets] : empty all pockets [No hair oils, wigs, hairpieces, pins] : no hair oils, wigs, hairpieces, pins  [Pre tx medications] : pre tx medications  [No make-up, creams] : no make-up, creams  [No jewelry] : no jewelry  [No matches, cigarettes, lighters] : no matches, cigarettes, lighters  [Hearing aid removed] : hearing aid removed [Dentures removed] : dentures removed [Ground bracelet on pt's wrist] : ground bracelet on pt's wrist  [Contacts removed] : contacts removed  [Remove nail polish] : remove nail polish  [No reading material] : no reading material  [Bra, undergarments removed] : bra, undergarments removed  [No contraindicated dressings] : no contraindicated dressings [Ground Wire - VISUAL Verification - Intact/Free of Obstruction] : Ground Wire - VISUAL Verification - Intact/Free of Obstruction  [Ground Continuity - Verified < 1 ohm w/ Wrist Strap Lamin] : Ground Continuity - Verified < 1 ohm w/ Wrist Strap Lamin [Number: ___] : Number: [unfilled] [Diagnosis: ___] : Diagnosis: [unfilled] [____] : Post-Dive: Time - [unfilled] [___] : Post-Dive: Value - [unfilled] mg/dL [Clear all fields] : clear all fields [] : No [FreeTextEntry3] : 90 [FreeTextEntry7] : 8:16 [FreeTextEntry9] : 9:46 [de-identified] : 9:55 [de-identified] : 108 MIN [FreeTextEntry5] : 4403

## 2021-12-21 NOTE — PROCEDURE
[Outpatient] : Outpatient [Ambulatory] : Patient is ambulatory. [THIS CHAMBER HAS BEEN CLEANED / DISINFECTED] : This chamber has been cleaned / disinfected according to local and hospital policy and procedure prior to this treatment. [Patient demonstrated and verbalized proper technique for using air break mask] : Patient demonstrated and verbalized proper technique for using air break mask [Patient educated on the risks of SMOKING prior to HBOT with understanding] : Patient educated on the risks of SMOKING prior to HBOT with understanding [Patient educated on the risks of CONSUMING ALCOHOL prior to HBOT with understanding] : Patient educated on the risks of CONSUMING ALCOHOL prior to HBOT with understanding [100% Cotton] : 100% cotton [Empty all pockets] : empty all pockets [No hair oils, wigs, hairpieces, pins] : no hair oils, wigs, hairpieces, pins  [Pre tx medications] : pre tx medications  [No make-up, creams] : no make-up, creams  [No jewelry] : no jewelry  [No matches, cigarettes, lighters] : no matches, cigarettes, lighters  [Hearing aid removed] : hearing aid removed [Dentures removed] : dentures removed [Ground bracelet on pt's wrist] : ground bracelet on pt's wrist  [Contacts removed] : contacts removed  [Remove nail polish] : remove nail polish  [No reading material] : no reading material  [Bra, undergarments removed] : bra, undergarments removed  [No contraindicated dressings] : no contraindicated dressings [Ground Wire - VISUAL Verification - Intact/Free of Obstruction] : Ground Wire - VISUAL Verification - Intact/Free of Obstruction  [Ground Continuity - Verified < 1 ohm w/ Wrist Strap Lamin] : Ground Continuity - Verified < 1 ohm w/ Wrist Strap Lamin [Number: ___] : Number: [unfilled] [Diagnosis: ___] : Diagnosis: [unfilled] [____] : Post-Dive: Time - [unfilled] [___] : Post-Dive: Value - [unfilled] mg/dL [Clear all fields] : clear all fields [] : No [FreeTextEntry3] : 90 [FreeTextEntry7] : 8:16 [FreeTextEntry9] : 9:46 [de-identified] : 9:55 [de-identified] : 108 MIN [FreeTextEntry5] : 8999

## 2021-12-21 NOTE — PROCEDURE
[Outpatient] : Outpatient [Ambulatory] : Patient is ambulatory. [THIS CHAMBER HAS BEEN CLEANED / DISINFECTED] : This chamber has been cleaned / disinfected according to local and hospital policy and procedure prior to this treatment. [Patient demonstrated and verbalized proper technique for using air break mask] : Patient demonstrated and verbalized proper technique for using air break mask [Patient educated on the risks of SMOKING prior to HBOT with understanding] : Patient educated on the risks of SMOKING prior to HBOT with understanding [Patient educated on the risks of CONSUMING ALCOHOL prior to HBOT with understanding] : Patient educated on the risks of CONSUMING ALCOHOL prior to HBOT with understanding [100% Cotton] : 100% cotton [Empty all pockets] : empty all pockets [No hair oils, wigs, hairpieces, pins] : no hair oils, wigs, hairpieces, pins  [Pre tx medications] : pre tx medications  [No make-up, creams] : no make-up, creams  [No jewelry] : no jewelry  [No matches, cigarettes, lighters] : no matches, cigarettes, lighters  [Hearing aid removed] : hearing aid removed [Dentures removed] : dentures removed [Ground bracelet on pt's wrist] : ground bracelet on pt's wrist  [Contacts removed] : contacts removed  [Remove nail polish] : remove nail polish  [No reading material] : no reading material  [Bra, undergarments removed] : bra, undergarments removed  [No contraindicated dressings] : no contraindicated dressings [Ground Wire - VISUAL Verification - Intact/Free of Obstruction] : Ground Wire - VISUAL Verification - Intact/Free of Obstruction  [Ground Continuity - Verified < 1 ohm w/ Wrist Strap Lamin] : Ground Continuity - Verified < 1 ohm w/ Wrist Strap Lamin [Number: ___] : Number: [unfilled] [Diagnosis: ___] : Diagnosis: [unfilled] [____] : Post-Dive: Time - [unfilled] [___] : Post-Dive: Value - [unfilled] mg/dL [Clear all fields] : clear all fields [] : No [FreeTextEntry3] : 90 [FreeTextEntry5] : 0842 [FreeTextEntry7] : 8744 [FreeTextEntry9] : 7950 [de-identified] : 0949 [de-identified] : 108 MINUTES

## 2021-12-22 ENCOUNTER — APPOINTMENT (OUTPATIENT)
Dept: HYPERBARIC MEDICINE | Facility: HOSPITAL | Age: 76
End: 2021-12-22
Payer: MEDICARE

## 2021-12-22 ENCOUNTER — OUTPATIENT (OUTPATIENT)
Dept: OUTPATIENT SERVICES | Facility: HOSPITAL | Age: 76
LOS: 1 days | Discharge: HOME | End: 2021-12-22
Payer: MEDICARE

## 2021-12-22 VITALS
TEMPERATURE: 97.2 F | SYSTOLIC BLOOD PRESSURE: 150 MMHG | OXYGEN SATURATION: 98 % | RESPIRATION RATE: 20 BRPM | HEART RATE: 73 BPM | DIASTOLIC BLOOD PRESSURE: 90 MMHG

## 2021-12-22 DIAGNOSIS — T86.828 OTHER COMPLICATIONS OF SKIN GRAFT (ALLOGRAFT) (AUTOGRAFT): ICD-10-CM

## 2021-12-22 DIAGNOSIS — E11.621 TYPE 2 DIABETES MELLITUS WITH FOOT ULCER: ICD-10-CM

## 2021-12-22 DIAGNOSIS — L97.524 NON-PRESSURE CHRONIC ULCER OF OTHER PART OF LEFT FOOT WITH NECROSIS OF BONE: ICD-10-CM
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
## 2021-12-22 DIAGNOSIS — Z90.89 ACQUIRED ABSENCE OF OTHER ORGANS: Chronic | ICD-10-CM

## 2021-12-22 PROCEDURE — 99183 HYPERBARIC OXYGEN THERAPY: CPT

## 2021-12-22 PROCEDURE — G0277: CPT

## 2021-12-22 PROCEDURE — 82962 GLUCOSE BLOOD TEST: CPT

## 2021-12-22 NOTE — PROCEDURE
[Outpatient] : Outpatient [Ambulatory] : Patient is ambulatory. [THIS CHAMBER HAS BEEN CLEANED / DISINFECTED] : This chamber has been cleaned / disinfected according to local and hospital policy and procedure prior to this treatment. [____] : Post-Dive: Time - [unfilled] [___] : Post-Dive: Value - [unfilled] mg/dL [Patient demonstrated and verbalized proper technique for using air break mask] : Patient demonstrated and verbalized proper technique for using air break mask [Patient educated on the risks of SMOKING prior to HBOT with understanding] : Patient educated on the risks of SMOKING prior to HBOT with understanding [Patient educated on the risks of CONSUMING ALCOHOL prior to HBOT with understanding] : Patient educated on the risks of CONSUMING ALCOHOL prior to HBOT with understanding [100% Cotton] : 100% cotton [Empty all pockets] : empty all pockets [No hair oils, wigs, hairpieces, pins] : no hair oils, wigs, hairpieces, pins  [Pre tx medications] : pre tx medications  [No make-up, creams] : no make-up, creams  [No jewelry] : no jewelry  [No matches, cigarettes, lighters] : no matches, cigarettes, lighters  [Hearing aid removed] : hearing aid removed [Dentures removed] : dentures removed [Ground bracelet on pt's wrist] : ground bracelet on pt's wrist  [Contacts removed] : contacts removed  [Remove nail polish] : remove nail polish  [No reading material] : no reading material  [Bra, undergarments removed] : bra, undergarments removed  [No contraindicated dressings] : no contraindicated dressings [Ground Wire - VISUAL Verification - Intact/Free of Obstruction] : Ground Wire - VISUAL Verification - Intact/Free of Obstruction  [Ground Continuity - Verified < 1 ohm w/ Wrist Strap Lamin] : Ground Continuity - Verified < 1 ohm w/ Wrist Strap Lamin [Clear all fields] : clear all fields [Number: ___] : Number: [unfilled] [Diagnosis: ___] : Diagnosis: [unfilled] [] : No [FreeTextEntry3] : 90 [FreeTextEntry5] : 3388 [FreeTextEntry7] : 4719 [FreeTextEntry9] : 0795 [de-identified] : 0977 [de-identified] : 108mins

## 2021-12-23 ENCOUNTER — APPOINTMENT (OUTPATIENT)
Dept: HYPERBARIC MEDICINE | Facility: HOSPITAL | Age: 76
End: 2021-12-23

## 2021-12-26 DIAGNOSIS — L97.524 NON-PRESSURE CHRONIC ULCER OF OTHER PART OF LEFT FOOT WITH NECROSIS OF BONE: ICD-10-CM
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
## 2021-12-26 DIAGNOSIS — T86.828 OTHER COMPLICATIONS OF SKIN GRAFT (ALLOGRAFT) (AUTOGRAFT): ICD-10-CM

## 2021-12-26 DIAGNOSIS — E11.621 TYPE 2 DIABETES MELLITUS WITH FOOT ULCER: ICD-10-CM

## 2021-12-29 ENCOUNTER — APPOINTMENT (OUTPATIENT)
Dept: WOUND CARE | Facility: HOSPITAL | Age: 76
End: 2021-12-29
Payer: MEDICARE

## 2021-12-29 ENCOUNTER — OUTPATIENT (OUTPATIENT)
Dept: OUTPATIENT SERVICES | Facility: HOSPITAL | Age: 76
LOS: 1 days | Discharge: HOME | End: 2021-12-29
Payer: MEDICARE

## 2021-12-29 VITALS
OXYGEN SATURATION: 96 % | HEART RATE: 67 BPM | SYSTOLIC BLOOD PRESSURE: 124 MMHG | TEMPERATURE: 97.9 F | BODY MASS INDEX: 31.15 KG/M2 | RESPIRATION RATE: 20 BRPM | DIASTOLIC BLOOD PRESSURE: 69 MMHG | WEIGHT: 230 LBS | HEIGHT: 72 IN

## 2021-12-29 DIAGNOSIS — T86.828 OTHER COMPLICATIONS OF SKIN GRAFT (ALLOGRAFT) (AUTOGRAFT): ICD-10-CM

## 2021-12-29 DIAGNOSIS — E11.621 TYPE 2 DIABETES MELLITUS WITH FOOT ULCER: ICD-10-CM

## 2021-12-29 DIAGNOSIS — Z90.89 ACQUIRED ABSENCE OF OTHER ORGANS: Chronic | ICD-10-CM

## 2021-12-29 PROCEDURE — 99213 OFFICE O/P EST LOW 20 MIN: CPT

## 2021-12-29 PROCEDURE — G0463: CPT

## 2021-12-29 NOTE — PLAN
[FreeTextEntry1] : 3rd toe left foot has a thick callus which is pre ulcerative , discussed distal amp with patient and he is in agreement with the  plan , continue to monitor , if wound develops distal amp will be needed Spent 20 minutes for patient care and medical decision making.\par

## 2021-12-29 NOTE — REVIEW OF SYSTEMS
[Fever] : no fever [Chills] : no chills [Eye Pain] : no eye pain [Loss Of Hearing] : no hearing loss [Shortness Of Breath] : no shortness of breath [Wheezing] : no wheezing [Abdominal Pain] : no abdominal pain [Joint Stiffness] : joint stiffness [Skin Wound] : no skin wound [Anxiety] : no anxiety [Easy Bleeding] : no tendency for easy bleeding [Easy Bruising] : no tendency for easy bruising [FreeTextEntry5] : HLD, HTN [de-identified] : all amp sites left foot closed , 3rd toe has pre ulcerative callus formation  [de-identified] : NIDDM with neuropathy  [de-identified] : NIDDM

## 2021-12-29 NOTE — HISTORY OF PRESENT ILLNESS
[FreeTextEntry1] : left hallux and 2nd toe healed , 3rd toe has thickened callus which is a pre ulcerative area and patient may need dstal amp , given his history of osteomyelitis

## 2021-12-29 NOTE — PHYSICAL EXAM
[2+] : left 2+ [Ankle Swelling (On Exam)] : not present [Varicose Veins Of Lower Extremities] : not present [] : not present [Skin Ulcer] : no ulcer [Alert] : alert [Oriented to Person] : oriented to person [Oriented to Place] : oriented to place [Calm] : calm [de-identified] : A&Ox3, NAD [de-identified] : HTN, HLD  [FreeTextEntry1] : digital hair present bilaterally [de-identified] : 5/5 strength in all quadrants bilaterally [de-identified] : left hallux and 2nd toe healed , the 3rd toe has a pre ulcerative callus  [de-identified] : loss of protective sensation bilaterally , NIDDM with neuropathy  [FreeTextEntry7] : Left foot 3rd digit callus  [de-identified] : Callus  [de-identified] : toe sock  [de-identified] : Cleansed with NS\par cloth tape \par \par *  Callus shaved by DPM  [de-identified] : None [de-identified] : other [de-identified] : None [de-identified] : None [de-identified] : No [de-identified] : 3x Weekly [de-identified] : Secondary Dressing

## 2021-12-29 NOTE — ASSESSMENT
[Verbal] : Verbal [Written] : Written [Demo] : Demo [Patient] : Patient [Good - alert, interested, motivated] : Good - alert, interested, motivated [Verbalizes knowledge/Understanding] : Verbalizes knowledge/understanding [Dressing changes] : dressing changes [Foot Care] : foot care [Skin Care] : skin care [Signs and symptoms of infection] : sign and symptoms of infection [Nutrition] : nutrition [How and When to Call] : how and when to call [Pain Management] : pain management [Off-loading] : off-loading [Patient responsibility to plan of care] : patient responsibility to plan of care [Glycemic Control] : glycemic control [Stable] : stable [Home] : Home [Ambulatory] : Ambulatory [Not Applicable - Long Term Care/Home Health Agency] : Long Term Care/Home Health Agency: Not Applicable [] : No [FreeTextEntry2] : Infection prevention\par Localized wound care \par Goal remaining pain free regarding wounds\par Offloading \par Low glycemic diet  [FreeTextEntry4] : Follow up in 1 week \par \par Rapid assessment preformed today due to infection control measures related to covid - 19 protocol.\par

## 2022-01-01 DIAGNOSIS — Z87.891 PERSONAL HISTORY OF NICOTINE DEPENDENCE: ICD-10-CM

## 2022-01-01 DIAGNOSIS — E11.40 TYPE 2 DIABETES MELLITUS WITH DIABETIC NEUROPATHY, UNSPECIFIED: ICD-10-CM

## 2022-01-01 DIAGNOSIS — T86.828 OTHER COMPLICATIONS OF SKIN GRAFT (ALLOGRAFT) (AUTOGRAFT): ICD-10-CM

## 2022-01-01 DIAGNOSIS — N28.9 DISORDER OF KIDNEY AND URETER, UNSPECIFIED: ICD-10-CM

## 2022-01-01 DIAGNOSIS — Z89.412 ACQUIRED ABSENCE OF LEFT GREAT TOE: ICD-10-CM

## 2022-01-01 DIAGNOSIS — E11.621 TYPE 2 DIABETES MELLITUS WITH FOOT ULCER: ICD-10-CM
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
## 2022-01-01 DIAGNOSIS — Z89.422 ACQUIRED ABSENCE OF OTHER LEFT TOE(S): ICD-10-CM

## 2022-01-01 DIAGNOSIS — E78.2 MIXED HYPERLIPIDEMIA: ICD-10-CM

## 2022-01-01 DIAGNOSIS — Z79.82 LONG TERM (CURRENT) USE OF ASPIRIN: ICD-10-CM

## 2022-01-01 DIAGNOSIS — I10 ESSENTIAL (PRIMARY) HYPERTENSION: ICD-10-CM

## 2022-01-01 DIAGNOSIS — L84 CORNS AND CALLOSITIES: ICD-10-CM

## 2022-01-01 DIAGNOSIS — L97.524 NON-PRESSURE CHRONIC ULCER OF OTHER PART OF LEFT FOOT WITH NECROSIS OF BONE: ICD-10-CM

## 2022-01-01 DIAGNOSIS — Z79.84 LONG TERM (CURRENT) USE OF ORAL HYPOGLYCEMIC DRUGS: ICD-10-CM

## 2022-01-01 DIAGNOSIS — Z79.899 OTHER LONG TERM (CURRENT) DRUG THERAPY: ICD-10-CM

## 2022-01-01 DIAGNOSIS — Z98.890 OTHER SPECIFIED POSTPROCEDURAL STATES: ICD-10-CM

## 2022-01-01 DIAGNOSIS — E88.81 METABOLIC SYNDROME AND OTHER INSULIN RESISTANCE: ICD-10-CM

## 2022-01-05 ENCOUNTER — OUTPATIENT (OUTPATIENT)
Dept: OUTPATIENT SERVICES | Facility: HOSPITAL | Age: 77
LOS: 1 days | Discharge: HOME | End: 2022-01-05
Payer: MEDICARE

## 2022-01-05 ENCOUNTER — APPOINTMENT (OUTPATIENT)
Dept: WOUND CARE | Facility: HOSPITAL | Age: 77
End: 2022-01-05
Payer: MEDICARE

## 2022-01-05 DIAGNOSIS — T86.828 OTHER COMPLICATIONS OF SKIN GRAFT (ALLOGRAFT) (AUTOGRAFT): ICD-10-CM

## 2022-01-05 DIAGNOSIS — Z90.89 ACQUIRED ABSENCE OF OTHER ORGANS: Chronic | ICD-10-CM

## 2022-01-05 DIAGNOSIS — I96 OTHER COMPLICATIONS OF SKIN GRAFT (ALLOGRAFT) (AUTOGRAFT): ICD-10-CM

## 2022-01-05 DIAGNOSIS — E11.621 TYPE 2 DIABETES MELLITUS WITH FOOT ULCER: ICD-10-CM

## 2022-01-05 PROCEDURE — 99213 OFFICE O/P EST LOW 20 MIN: CPT

## 2022-01-05 PROCEDURE — G0463: CPT

## 2022-01-05 NOTE — ASSESSMENT
[Verbal] : Verbal [Written] : Written [Demo] : Demo [Patient] : Patient [Good - alert, interested, motivated] : Good - alert, interested, motivated [Verbalizes knowledge/Understanding] : Verbalizes knowledge/understanding [Dressing changes] : dressing changes [Foot Care] : foot care [Skin Care] : skin care [Signs and symptoms of infection] : sign and symptoms of infection [Nutrition] : nutrition [How and When to Call] : how and when to call [Pain Management] : pain management [Off-loading] : off-loading [Patient responsibility to plan of care] : patient responsibility to plan of care [Glycemic Control] : glycemic control [Stable] : stable [Home] : Home [Ambulatory] : Ambulatory [Not Applicable - Long Term Care/Home Health Agency] : Long Term Care/Home Health Agency: Not Applicable [] : Yes [FreeTextEntry2] : Infection prevention\par Localized wound care \par Goal remaining pain free regarding wounds\par Offloading \par Low glycemic diet  [FreeTextEntry4] : Submitted for distal amputation next assessment as per DPM\par DPM escribed Cipro for patient to take prior to procedure\par Follow up in 1 week for procedure

## 2022-01-05 NOTE — PLAN
[FreeTextEntry1] : Knowing patient past history he has electively decided to proceed with distal amp of the 3rd toe before the condition gets worse , all risks , benefits and potential complications have been explained , and he agrees with the plan . Spent 20 minutes for patient care and medical decision making.\par

## 2022-01-05 NOTE — PHYSICAL EXAM
[2+] : left 2+ [Ankle Swelling (On Exam)] : not present [Varicose Veins Of Lower Extremities] : not present [] : not present [Skin Ulcer] : no ulcer [Alert] : alert [Oriented to Person] : oriented to person [Oriented to Place] : oriented to place [Calm] : calm [de-identified] : A&Ox3, NAD [de-identified] : HTN, HLD  [FreeTextEntry1] : digital hair present bilaterally [de-identified] : 5/5 strength in all quadrants bilaterally [de-identified] : left hallux and 2nd toe healed , the 3rd toe has a pre ulcerative callus  [de-identified] : loss of protective sensation bilaterally , NIDDM with neuropathy  [FreeTextEntry7] : Left foot 3rd digit callus  [de-identified] : Callus  [de-identified] : toe sock  [de-identified] : None [de-identified] : Cleansed with Normal Saline\par cloth tape \par \par *  Callus shaved by DPM  [de-identified] : other [de-identified] : None [de-identified] : None [de-identified] : No [de-identified] : 3x Weekly [de-identified] : Secondary Dressing

## 2022-01-05 NOTE — REVIEW OF SYSTEMS
<<----- Click to add NO pertinent Family History [Fever] : no fever [Chills] : no chills [Eye Pain] : no eye pain [Loss Of Hearing] : no hearing loss [Shortness Of Breath] : no shortness of breath [Wheezing] : no wheezing [Abdominal Pain] : no abdominal pain [Joint Stiffness] : joint stiffness [Skin Wound] : no skin wound [Anxiety] : no anxiety [Easy Bleeding] : no tendency for easy bleeding [Easy Bruising] : no tendency for easy bruising [FreeTextEntry5] : HLD, HTN [de-identified] : all amp sites left foot closed , 3rd toe has pre ulcerative callus formation  [de-identified] : NIDDM with neuropathy  [de-identified] : NIDDM

## 2022-01-06 DIAGNOSIS — Z87.891 PERSONAL HISTORY OF NICOTINE DEPENDENCE: ICD-10-CM
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
## 2022-01-06 DIAGNOSIS — Z79.82 LONG TERM (CURRENT) USE OF ASPIRIN: ICD-10-CM

## 2022-01-06 DIAGNOSIS — Z79.899 OTHER LONG TERM (CURRENT) DRUG THERAPY: ICD-10-CM

## 2022-01-06 DIAGNOSIS — N28.9 DISORDER OF KIDNEY AND URETER, UNSPECIFIED: ICD-10-CM

## 2022-01-06 DIAGNOSIS — E11.621 TYPE 2 DIABETES MELLITUS WITH FOOT ULCER: ICD-10-CM

## 2022-01-06 DIAGNOSIS — L84 CORNS AND CALLOSITIES: ICD-10-CM

## 2022-01-06 DIAGNOSIS — I10 ESSENTIAL (PRIMARY) HYPERTENSION: ICD-10-CM

## 2022-01-06 DIAGNOSIS — T86.828 OTHER COMPLICATIONS OF SKIN GRAFT (ALLOGRAFT) (AUTOGRAFT): ICD-10-CM

## 2022-01-06 DIAGNOSIS — Z79.84 LONG TERM (CURRENT) USE OF ORAL HYPOGLYCEMIC DRUGS: ICD-10-CM

## 2022-01-06 DIAGNOSIS — E88.81 METABOLIC SYNDROME AND OTHER INSULIN RESISTANCE: ICD-10-CM

## 2022-01-06 DIAGNOSIS — Z89.412 ACQUIRED ABSENCE OF LEFT GREAT TOE: ICD-10-CM

## 2022-01-06 DIAGNOSIS — E11.40 TYPE 2 DIABETES MELLITUS WITH DIABETIC NEUROPATHY, UNSPECIFIED: ICD-10-CM

## 2022-01-06 DIAGNOSIS — Z89.422 ACQUIRED ABSENCE OF OTHER LEFT TOE(S): ICD-10-CM

## 2022-01-06 DIAGNOSIS — E78.2 MIXED HYPERLIPIDEMIA: ICD-10-CM

## 2022-01-06 DIAGNOSIS — Z98.890 OTHER SPECIFIED POSTPROCEDURAL STATES: ICD-10-CM

## 2022-01-06 DIAGNOSIS — L97.524 NON-PRESSURE CHRONIC ULCER OF OTHER PART OF LEFT FOOT WITH NECROSIS OF BONE: ICD-10-CM

## 2022-01-19 ENCOUNTER — RESULT REVIEW (OUTPATIENT)
Age: 77
End: 2022-01-19

## 2022-01-19 ENCOUNTER — APPOINTMENT (OUTPATIENT)
Dept: WOUND CARE | Facility: HOSPITAL | Age: 77
End: 2022-01-19
Payer: MEDICARE

## 2022-01-19 ENCOUNTER — OUTPATIENT (OUTPATIENT)
Dept: OUTPATIENT SERVICES | Facility: HOSPITAL | Age: 77
LOS: 1 days | Discharge: HOME | End: 2022-01-19
Payer: MEDICARE

## 2022-01-19 VITALS
BODY MASS INDEX: 31.15 KG/M2 | HEART RATE: 76 BPM | OXYGEN SATURATION: 96 % | HEIGHT: 72 IN | SYSTOLIC BLOOD PRESSURE: 128 MMHG | DIASTOLIC BLOOD PRESSURE: 62 MMHG | RESPIRATION RATE: 22 BRPM | WEIGHT: 230 LBS | TEMPERATURE: 97.3 F

## 2022-01-19 DIAGNOSIS — Z90.89 ACQUIRED ABSENCE OF OTHER ORGANS: Chronic | ICD-10-CM

## 2022-01-19 DIAGNOSIS — E11.621 TYPE 2 DIABETES MELLITUS WITH FOOT ULCER: ICD-10-CM

## 2022-01-19 PROCEDURE — 88304 TISSUE EXAM BY PATHOLOGIST: CPT | Mod: 26

## 2022-01-19 PROCEDURE — 88311 DECALCIFY TISSUE: CPT | Mod: 26

## 2022-01-19 PROCEDURE — 88311 DECALCIFY TISSUE: CPT

## 2022-01-19 PROCEDURE — 28825 PARTIAL AMPUTATION OF TOE: CPT | Mod: T2

## 2022-01-19 PROCEDURE — 88304 TISSUE EXAM BY PATHOLOGIST: CPT

## 2022-01-19 RX ORDER — DOXYCYCLINE HYCLATE 100 MG/1
100 CAPSULE ORAL
Qty: 30 | Refills: 2 | Status: COMPLETED | COMMUNITY
Start: 2022-01-19 | End: 2022-03-05

## 2022-01-21 ENCOUNTER — APPOINTMENT (OUTPATIENT)
Dept: WOUND CARE | Facility: HOSPITAL | Age: 77
End: 2022-01-21
Payer: MEDICARE

## 2022-01-21 ENCOUNTER — OUTPATIENT (OUTPATIENT)
Dept: OUTPATIENT SERVICES | Facility: HOSPITAL | Age: 77
LOS: 1 days | Discharge: HOME | End: 2022-01-21
Payer: MEDICARE

## 2022-01-21 VITALS
RESPIRATION RATE: 20 BRPM | HEIGHT: 72 IN | SYSTOLIC BLOOD PRESSURE: 131 MMHG | BODY MASS INDEX: 31.15 KG/M2 | DIASTOLIC BLOOD PRESSURE: 72 MMHG | WEIGHT: 230 LBS | HEART RATE: 71 BPM | OXYGEN SATURATION: 100 % | TEMPERATURE: 97.8 F

## 2022-01-21 DIAGNOSIS — Z90.89 ACQUIRED ABSENCE OF OTHER ORGANS: Chronic | ICD-10-CM

## 2022-01-21 DIAGNOSIS — E11.621 TYPE 2 DIABETES MELLITUS WITH FOOT ULCER: ICD-10-CM

## 2022-01-21 PROCEDURE — G0463: CPT

## 2022-01-21 PROCEDURE — ZZZZZ: CPT

## 2022-01-21 NOTE — PHYSICAL EXAM
[4 x 4] : 4 x 4  [Abdominal Pad] : Abdominal Pad [2+] : left 2+ [Ankle Swelling (On Exam)] : not present [Varicose Veins Of Lower Extremities] : not present [] : not present [Skin Ulcer] : no ulcer [Alert] : alert [Oriented to Person] : oriented to person [Oriented to Place] : oriented to place [Calm] : calm [de-identified] : A&Ox3, NAD [de-identified] : HTN, HLD  [de-identified] : 5/5 strength in all quadrants bilaterally [de-identified] : left hallux and 2nd toe healed , the 3rd toe has a pre ulcerative callus  [de-identified] : loss of protective sensation bilaterally , NIDDM with neuropathy  [FreeTextEntry1] : Left foot 3rd distal digit (Now open)  [FreeTextEntry2] : 1.6 [FreeTextEntry3] : 2 [FreeTextEntry4] : to bone  [de-identified] : Serous/sanguinous [de-identified] : Serous/sanguinous [de-identified] : For support  [de-identified] : Silver alginate [de-identified] : Cleansed with NS\par Kerlix \par * Bandage applied by DPM  [TWNoteComboBox4] : Small [de-identified] : Macerated [de-identified] : Moderate [de-identified] : None [de-identified] : >75% [de-identified] : No [de-identified] : Ace wraps [de-identified] : 3x Weekly [de-identified] : Primary Dressing

## 2022-01-21 NOTE — ASSESSMENT
[Verbal] : Verbal [Written] : Written [Demo] : Demo [Patient] : Patient [Good - alert, interested, motivated] : Good - alert, interested, motivated [Verbalizes knowledge/Understanding] : Verbalizes knowledge/understanding [Dressing changes] : dressing changes [Foot Care] : foot care [Skin Care] : skin care [Signs and symptoms of infection] : sign and symptoms of infection [Surgery] : surgery [Nutrition] : nutrition [How and When to Call] : how and when to call [Labs and Tests] : labs and tests [Pain Management] : pain management [Off-loading] : off-loading [Patient responsibility to plan of care] : patient responsibility to plan of care [Glycemic Control] : glycemic control [Stable] : stable [Home] : Home [Ambulatory] : Ambulatory [Not Applicable - Long Term Care/Home Health Agency] : Long Term Care/Home Health Agency: Not Applicable [] : No [FreeTextEntry2] : Infection prevention\par Localized wound care \par Goal remaining pain free regarding wounds\par Offloading \par Promote optimal nutrition  [FreeTextEntry3] : Surgical procedure preformed \par  [FreeTextEntry4] : Pathology samples sent to lab \par Patient currently on PO Cipro\par Doxycycline E scribed \par Follow up 1/21/22 for post op dressing change

## 2022-01-21 NOTE — PHYSICAL EXAM
[4 x 4] : 4 x 4  [Abdominal Pad] : Abdominal Pad [2+] : left 2+ [Ankle Swelling (On Exam)] : not present [Varicose Veins Of Lower Extremities] : not present [] : not present [Skin Ulcer] : no ulcer [Alert] : alert [Oriented to Person] : oriented to person [Oriented to Place] : oriented to place [Calm] : calm [de-identified] : A&Ox3, NAD [de-identified] : HTN, HLD  [de-identified] : 5/5 strength in all quadrants bilaterally [de-identified] : left hallux and 2nd toe healed , the 3rd toe has a pre ulcerative callus  [de-identified] : loss of protective sensation bilaterally , NIDDM with neuropathy  [FreeTextEntry1] : Left foot 3rd distal digit (Now open)  [FreeTextEntry2] : 1.6 [FreeTextEntry3] : 2 [FreeTextEntry4] : to bone  [de-identified] : Serous/sanguinous [de-identified] : Serous/sanguinous [de-identified] : For support  [de-identified] : Silver alginate [de-identified] : Cleansed with NS\par Kerlix \par * Bandage applied by DPM  [TWNoteComboBox4] : Small [de-identified] : Macerated [de-identified] : Moderate [de-identified] : None [de-identified] : >75% [de-identified] : No [de-identified] : Ace wraps [de-identified] : 3x Weekly [de-identified] : Primary Dressing

## 2022-01-21 NOTE — REVIEW OF SYSTEMS
[FreeTextEntry1] : 0920hr [Fever] : no fever [Chills] : no chills [Eye Pain] : no eye pain [Loss Of Hearing] : no hearing loss [Shortness Of Breath] : no shortness of breath [Wheezing] : no wheezing [Abdominal Pain] : no abdominal pain [Joint Stiffness] : joint stiffness [Skin Wound] : no skin wound [Anxiety] : no anxiety [Easy Bleeding] : no tendency for easy bleeding [Easy Bruising] : no tendency for easy bruising [FreeTextEntry5] : HLD, HTN [de-identified] : all amp sites left foot closed , 3rd toe has pre ulcerative callus formation  [de-identified] : NIDDM with neuropathy  [de-identified] : NIDDM

## 2022-01-21 NOTE — REVIEW OF SYSTEMS
[FreeTextEntry1] : 0920hr [Fever] : no fever [Chills] : no chills [Eye Pain] : no eye pain [Loss Of Hearing] : no hearing loss [Shortness Of Breath] : no shortness of breath [Wheezing] : no wheezing [Abdominal Pain] : no abdominal pain [Joint Stiffness] : joint stiffness [Skin Wound] : no skin wound [Anxiety] : no anxiety [Easy Bleeding] : no tendency for easy bleeding [Easy Bruising] : no tendency for easy bruising [FreeTextEntry5] : HLD, HTN [de-identified] : all amp sites left foot closed , 3rd toe has pre ulcerative callus formation  [de-identified] : NIDDM with neuropathy  [de-identified] : NIDDM

## 2022-01-21 NOTE — PROCEDURE
[___ ml of 2% Xylocaine] : [unfilled] ml of 2% Xylocaine [Left foot, 3rd digit] : third digit of the left foot [Coapted with _____ nylon sutures] :  Once this was accomplished, it was then coapted with [unfilled] nylon sutures in a simple interrupted fashion. [# of sutures used: _____] : Approximately [unfilled] sutures were used in total  [Total incision length___cm] : and the total incision length was approximately [unfilled] cm.   The area was then cleaned with sterile saline.  Both the dorsal and the plantar flap were found to be viable and the patient was dressed in a dry sterile dressing.   [] : Yes [FreeTextEntry9] : 0950hr  [de-identified] : DFU 3 distal 3rd toe left , probes to bone  [de-identified] : Brittney [FreeTextEntry6] : DFU 3 distal left 3rd toe  [FreeTextEntry7] : same [de-identified] : 10 cc marcaine [de-identified] : 10 cc  [de-identified] : distal left 3rd toe ,, phalanx , nail , skin

## 2022-01-21 NOTE — REASON FOR VISIT
[3rd digit] : third digit [Left] : left foot including the distal phalanx and a portion of the middle phalanx [FreeTextEntry4] : Osteomyelitis of the 3rd toe left foot , with DFU 3 probing to bone

## 2022-01-21 NOTE — PROCEDURE
[___ ml of 2% Xylocaine] : [unfilled] ml of 2% Xylocaine [Left foot, 3rd digit] : third digit of the left foot [Coapted with _____ nylon sutures] :  Once this was accomplished, it was then coapted with [unfilled] nylon sutures in a simple interrupted fashion. [# of sutures used: _____] : Approximately [unfilled] sutures were used in total  [Total incision length___cm] : and the total incision length was approximately [unfilled] cm.   The area was then cleaned with sterile saline.  Both the dorsal and the plantar flap were found to be viable and the patient was dressed in a dry sterile dressing.   [] : Yes [FreeTextEntry9] : 0950hr  [de-identified] : DFU 3 distal 3rd toe left , probes to bone  [de-identified] : Brittney [FreeTextEntry6] : DFU 3 distal left 3rd toe  [FreeTextEntry7] : same [de-identified] : 10 cc marcaine [de-identified] : 10 cc  [de-identified] : distal left 3rd toe ,, phalanx , nail , skin

## 2022-01-24 DIAGNOSIS — E11.621 TYPE 2 DIABETES MELLITUS WITH FOOT ULCER: ICD-10-CM

## 2022-01-24 DIAGNOSIS — M86.172 OTHER ACUTE OSTEOMYELITIS, LEFT ANKLE AND FOOT: ICD-10-CM

## 2022-01-24 DIAGNOSIS — Z87.891 PERSONAL HISTORY OF NICOTINE DEPENDENCE: ICD-10-CM

## 2022-01-24 DIAGNOSIS — Z79.899 OTHER LONG TERM (CURRENT) DRUG THERAPY: ICD-10-CM

## 2022-01-24 DIAGNOSIS — E78.2 MIXED HYPERLIPIDEMIA: ICD-10-CM

## 2022-01-24 DIAGNOSIS — Z79.84 LONG TERM (CURRENT) USE OF ORAL HYPOGLYCEMIC DRUGS: ICD-10-CM

## 2022-01-24 DIAGNOSIS — Z79.82 LONG TERM (CURRENT) USE OF ASPIRIN: ICD-10-CM

## 2022-01-24 DIAGNOSIS — E88.81 METABOLIC SYNDROME AND OTHER INSULIN RESISTANCE: ICD-10-CM

## 2022-01-24 DIAGNOSIS — L84 CORNS AND CALLOSITIES: ICD-10-CM

## 2022-01-24 DIAGNOSIS — Z89.422 ACQUIRED ABSENCE OF OTHER LEFT TOE(S): ICD-10-CM

## 2022-01-24 DIAGNOSIS — N28.9 DISORDER OF KIDNEY AND URETER, UNSPECIFIED: ICD-10-CM

## 2022-01-24 DIAGNOSIS — E11.69 TYPE 2 DIABETES MELLITUS WITH OTHER SPECIFIED COMPLICATION: ICD-10-CM

## 2022-01-24 DIAGNOSIS — Z98.890 OTHER SPECIFIED POSTPROCEDURAL STATES: ICD-10-CM

## 2022-01-24 DIAGNOSIS — I10 ESSENTIAL (PRIMARY) HYPERTENSION: ICD-10-CM

## 2022-01-24 DIAGNOSIS — L97.524 NON-PRESSURE CHRONIC ULCER OF OTHER PART OF LEFT FOOT WITH NECROSIS OF BONE: ICD-10-CM

## 2022-01-24 DIAGNOSIS — L03.116 CELLULITIS OF LEFT LOWER LIMB: ICD-10-CM

## 2022-01-24 DIAGNOSIS — E11.40 TYPE 2 DIABETES MELLITUS WITH DIABETIC NEUROPATHY, UNSPECIFIED: ICD-10-CM

## 2022-01-24 LAB — SURGICAL PATHOLOGY STUDY: SIGNIFICANT CHANGE UP

## 2022-01-25 ENCOUNTER — APPOINTMENT (OUTPATIENT)
Dept: WOUND CARE | Facility: HOSPITAL | Age: 77
End: 2022-01-25
Payer: MEDICARE

## 2022-01-25 ENCOUNTER — OUTPATIENT (OUTPATIENT)
Dept: OUTPATIENT SERVICES | Facility: HOSPITAL | Age: 77
LOS: 1 days | Discharge: HOME | End: 2022-01-25
Payer: MEDICARE

## 2022-01-25 VITALS
SYSTOLIC BLOOD PRESSURE: 123 MMHG | HEIGHT: 72 IN | WEIGHT: 230 LBS | BODY MASS INDEX: 31.15 KG/M2 | DIASTOLIC BLOOD PRESSURE: 70 MMHG | OXYGEN SATURATION: 100 % | HEART RATE: 86 BPM | TEMPERATURE: 97.8 F | RESPIRATION RATE: 22 BRPM

## 2022-01-25 DIAGNOSIS — E88.81 METABOLIC SYNDROME AND OTHER INSULIN RESISTANCE: ICD-10-CM

## 2022-01-25 DIAGNOSIS — E11.621 TYPE 2 DIABETES MELLITUS WITH FOOT ULCER: ICD-10-CM

## 2022-01-25 DIAGNOSIS — Z90.89 ACQUIRED ABSENCE OF OTHER ORGANS: Chronic | ICD-10-CM

## 2022-01-25 DIAGNOSIS — Z89.422 ACQUIRED ABSENCE OF OTHER LEFT TOE(S): ICD-10-CM

## 2022-01-25 DIAGNOSIS — L97.524 NON-PRESSURE CHRONIC ULCER OF OTHER PART OF LEFT FOOT WITH NECROSIS OF BONE: ICD-10-CM

## 2022-01-25 DIAGNOSIS — N28.9 DISORDER OF KIDNEY AND URETER, UNSPECIFIED: ICD-10-CM

## 2022-01-25 DIAGNOSIS — Z87.891 PERSONAL HISTORY OF NICOTINE DEPENDENCE: ICD-10-CM

## 2022-01-25 DIAGNOSIS — Z89.412 ACQUIRED ABSENCE OF LEFT GREAT TOE: ICD-10-CM

## 2022-01-25 DIAGNOSIS — Z48.02 ENCOUNTER FOR REMOVAL OF SUTURES: ICD-10-CM

## 2022-01-25 DIAGNOSIS — Z79.82 LONG TERM (CURRENT) USE OF ASPIRIN: ICD-10-CM

## 2022-01-25 DIAGNOSIS — Z79.84 LONG TERM (CURRENT) USE OF ORAL HYPOGLYCEMIC DRUGS: ICD-10-CM

## 2022-01-25 DIAGNOSIS — Z79.899 OTHER LONG TERM (CURRENT) DRUG THERAPY: ICD-10-CM

## 2022-01-25 DIAGNOSIS — E11.40 TYPE 2 DIABETES MELLITUS WITH DIABETIC NEUROPATHY, UNSPECIFIED: ICD-10-CM

## 2022-01-25 DIAGNOSIS — Z98.890 OTHER SPECIFIED POSTPROCEDURAL STATES: ICD-10-CM

## 2022-01-25 DIAGNOSIS — I10 ESSENTIAL (PRIMARY) HYPERTENSION: ICD-10-CM

## 2022-01-25 DIAGNOSIS — E78.2 MIXED HYPERLIPIDEMIA: ICD-10-CM

## 2022-01-25 PROCEDURE — G0463: CPT

## 2022-01-25 PROCEDURE — 99212 OFFICE O/P EST SF 10 MIN: CPT

## 2022-01-25 NOTE — PHYSICAL EXAM
[4 x 4] : 4 x 4  [Abdominal Pad] : Abdominal Pad [2+] : left 2+ [Ankle Swelling (On Exam)] : not present [Varicose Veins Of Lower Extremities] : not present [] : not present [Skin Ulcer] : no ulcer [Alert] : alert [Oriented to Person] : oriented to person [Oriented to Place] : oriented to place [Calm] : calm [de-identified] : A&Ox3, NAD [de-identified] : HTN, HLD  [de-identified] : 5/5 strength in all quadrants bilaterally [de-identified] : left 3rd toe partial amputation site with sutures intact, no dehiscence, no proximal streaking, no fluctuance, no purulence [de-identified] : loss of protective sensation bilaterally , NIDDM with neuropathy  [de-identified] : distal sutures removed by DPM  [FreeTextEntry1] : Foot 3rd Distal Digit Amp Site- sutures in place  [de-identified] : serosanguineous [de-identified] : moderately  [de-identified] : Silver alginate [de-identified] : Cleansed with Normal saline\par  [TWNoteComboBox1] : Left [TWNoteComboBox4] : Small [TWNoteComboBox5] : No [TWNoteComboBox6] : Surgical [de-identified] : No [de-identified] : Macerated [de-identified] : None [de-identified] : None [de-identified] : 100% [de-identified] : No [de-identified] : Ace wraps [de-identified] : 3x Weekly [de-identified] : Primary Dressing

## 2022-01-25 NOTE — REVIEW OF SYSTEMS
[Fever] : no fever [Chills] : no chills [Eye Pain] : no eye pain [Loss Of Hearing] : no hearing loss [Shortness Of Breath] : no shortness of breath [Wheezing] : no wheezing [Abdominal Pain] : no abdominal pain [Joint Stiffness] : joint stiffness [Skin Wound] : no skin wound [Anxiety] : no anxiety [Easy Bleeding] : no tendency for easy bleeding [Easy Bruising] : no tendency for easy bruising [FreeTextEntry5] : HLD, HTN [de-identified] : all amp sites left foot closed , 3rd toe has pre ulcerative callus formation  [de-identified] : NIDDM with neuropathy  [de-identified] : NIDDM

## 2022-01-25 NOTE — HISTORY OF PRESENT ILLNESS
[FreeTextEntry1] : Pt seen s/p left 3rd toe distal amputation due to distal preulcerative changes, currently no SOI

## 2022-01-25 NOTE — VITALS
[Pain related to present condition?] : The patient's  pain is not related to present condition. [] : No [de-identified] : 0/10

## 2022-01-25 NOTE — ASSESSMENT
[Verbal] : Verbal [Written] : Written [Demo] : Demo [Patient] : Patient [Good - alert, interested, motivated] : Good - alert, interested, motivated [Verbalizes knowledge/Understanding] : Verbalizes knowledge/understanding [Dressing changes] : dressing changes [Foot Care] : foot care [Skin Care] : skin care [Signs and symptoms of infection] : sign and symptoms of infection [Surgery] : surgery [Nutrition] : nutrition [How and When to Call] : how and when to call [Labs and Tests] : labs and tests [Pain Management] : pain management [Off-loading] : off-loading [Patient responsibility to plan of care] : patient responsibility to plan of care [Glycemic Control] : glycemic control [Stable] : stable [Home] : Home [Ambulatory] : Ambulatory [Not Applicable - Long Term Care/Home Health Agency] : Long Term Care/Home Health Agency: Not Applicable [] : No [FreeTextEntry2] : Infection prevention\par Localized wound care \par Goal remaining pain free regarding wounds\par Offloading \par Promote optimal nutrition \par F/U 1/27/22 for a dressing change and in 1 week for an assessment [FreeTextEntry3] : \par  [FreeTextEntry4] : Patient is currently still in progress with oral antibiotic regimen.\par F/U 1/27/22 for a dressing change and in 1 week for an assessment

## 2022-01-27 ENCOUNTER — OUTPATIENT (OUTPATIENT)
Dept: OUTPATIENT SERVICES | Facility: HOSPITAL | Age: 77
LOS: 1 days | Discharge: HOME | End: 2022-01-27
Payer: MEDICARE

## 2022-01-27 ENCOUNTER — APPOINTMENT (OUTPATIENT)
Dept: WOUND CARE | Facility: HOSPITAL | Age: 77
End: 2022-01-27
Payer: MEDICARE

## 2022-01-27 VITALS
OXYGEN SATURATION: 97 % | RESPIRATION RATE: 20 BRPM | HEIGHT: 72 IN | DIASTOLIC BLOOD PRESSURE: 68 MMHG | WEIGHT: 230 LBS | TEMPERATURE: 97.4 F | SYSTOLIC BLOOD PRESSURE: 140 MMHG | HEART RATE: 65 BPM | BODY MASS INDEX: 31.15 KG/M2

## 2022-01-27 DIAGNOSIS — Z90.89 ACQUIRED ABSENCE OF OTHER ORGANS: Chronic | ICD-10-CM

## 2022-01-27 DIAGNOSIS — E11.621 TYPE 2 DIABETES MELLITUS WITH FOOT ULCER: ICD-10-CM

## 2022-01-27 PROCEDURE — ZZZZZ: CPT

## 2022-01-27 PROCEDURE — G0463: CPT

## 2022-02-01 NOTE — H&P ADULT - NSHPSOURCEINFOTX_GEN_ALL_CORE
LOV 12/10/2021 with Whit PRABHAKARV     Call to patient's mother, Steph. (SIA on file)   She said patient is having the same issues as before, they have not gotten better.   Is asking if the next step is to do a Colonoscopy?    Never started the Omeprazole as patient doesn't have heartburn and  looked up the possible side effects and were concerned with that. Informed Steph as previously discussed, Omeprazole was prescribed due to patient reporting that most morning he will wake up with a stomach ache and uneasy feeling lasting 45 minutes. Mom asked how the Omeprazole would help with that?    Notes patient is taking Probiotic but not every day, as patient forgets.     Ok for call back 2/2/2022.    COVID vaccinated, Pfizer 5/2021

## 2022-02-02 DIAGNOSIS — Z89.422 ACQUIRED ABSENCE OF OTHER LEFT TOE(S): ICD-10-CM

## 2022-02-02 DIAGNOSIS — E11.621 TYPE 2 DIABETES MELLITUS WITH FOOT ULCER: ICD-10-CM

## 2022-02-02 DIAGNOSIS — L97.524 NON-PRESSURE CHRONIC ULCER OF OTHER PART OF LEFT FOOT WITH NECROSIS OF BONE: ICD-10-CM

## 2022-02-04 ENCOUNTER — APPOINTMENT (OUTPATIENT)
Dept: WOUND CARE | Facility: HOSPITAL | Age: 77
End: 2022-02-04
Payer: MEDICARE

## 2022-02-04 ENCOUNTER — OUTPATIENT (OUTPATIENT)
Dept: OUTPATIENT SERVICES | Facility: HOSPITAL | Age: 77
LOS: 1 days | Discharge: HOME | End: 2022-02-04
Payer: MEDICARE

## 2022-02-04 VITALS
BODY MASS INDEX: 31.15 KG/M2 | WEIGHT: 230 LBS | DIASTOLIC BLOOD PRESSURE: 79 MMHG | HEIGHT: 72 IN | OXYGEN SATURATION: 95 % | RESPIRATION RATE: 22 BRPM | TEMPERATURE: 97.7 F | SYSTOLIC BLOOD PRESSURE: 134 MMHG | HEART RATE: 69 BPM

## 2022-02-04 DIAGNOSIS — E11.621 TYPE 2 DIABETES MELLITUS WITH FOOT ULCER: ICD-10-CM

## 2022-02-04 DIAGNOSIS — Z90.89 ACQUIRED ABSENCE OF OTHER ORGANS: Chronic | ICD-10-CM

## 2022-02-04 DIAGNOSIS — T87.81 DEHISCENCE OF AMPUTATION STUMP: ICD-10-CM

## 2022-02-04 PROCEDURE — 12020 TX SUPFC WND DEHSN SMPL CLSR: CPT

## 2022-02-07 PROBLEM — T87.81 DEHISCENCE OF AMPUTATION STUMP: Status: ACTIVE | Noted: 2022-02-07

## 2022-02-07 NOTE — PROCEDURE
[FreeTextEntry9] : 4196 [de-identified] : left 3rd toe partial amputation site with dehiscence, no proximal streaking, no fluctuance, no purulence [de-identified] : larry [de-identified] : aaron [FreeTextEntry6] : left 3rd toe partial amputation site with dehiscence, no proximal streaking, no fluctuance, no purulence [FreeTextEntry7] : left 3rd toe partial amputation site with dehiscence, no proximal streaking, no fluctuance, no purulence [de-identified] : 3mL

## 2022-02-07 NOTE — REVIEW OF SYSTEMS
[FreeTextEntry1] : 4361 [Fever] : no fever [Chills] : no chills [Eye Pain] : no eye pain [Loss Of Hearing] : no hearing loss [Shortness Of Breath] : no shortness of breath [Wheezing] : no wheezing [Abdominal Pain] : no abdominal pain [Skin Wound] : no skin wound [Anxiety] : no anxiety [Easy Bleeding] : no tendency for easy bleeding [Easy Bruising] : no tendency for easy bruising [FreeTextEntry5] : HLD, HTN [de-identified] : left 3rd toe partial amputation site with dehiscence, no proximal streaking, no fluctuance, no purulence [de-identified] : NIDDM with neuropathy  [de-identified] : NIDDM

## 2022-02-07 NOTE — ASSESSMENT
[FreeTextEntry2] : Infection prevention\par Localized wound care \par Goal remaining pain free regarding wounds\par Offloading \par Promote optimal nutrition \par  c/o fever, cough and SOB since yesterday. pt speaking in full sentences. No distress noted [FreeTextEntry3] : \par  [FreeTextEntry4] : Pt is still taking oral abt therapy. Pt denies side effects related to antibiotics. \par Authorization submitted for same day delayed primary closure of the left foot 3rd digit (s/p distal amp) *emergent*\par F/u Tuesday, 2/8/22 for dressing change. Assessment in 1 week.\par

## 2022-02-07 NOTE — PHYSICAL EXAM
[Ankle Swelling (On Exam)] : not present [Varicose Veins Of Lower Extremities] : not present [] : not present [Skin Ulcer] : no ulcer [de-identified] : A&Ox3, NAD [de-identified] : HTN, HLD  [de-identified] : 5/5 strength in all quadrants bilaterally [de-identified] : left 3rd toe partial amputation site with dehiscence, no proximal streaking, no fluctuance, no purulence [de-identified] : loss of protective sensation bilaterally , NIDDM with neuropathy  [de-identified] : Same day authorization submitted for delayed primary closure *emergent* [FreeTextEntry1] : Foot 3rd Distal Digit Amp Site [FreeTextEntry2] : 2.8 [FreeTextEntry3] : 0.9 [FreeTextEntry4] : 0.2 [de-identified] : sanguineous  [de-identified] : mild [de-identified] : delayed primary closure [de-identified] : Alginate Ag [de-identified] : NSC\par DD\par  [TWNoteComboBox1] : Left [TWNoteComboBox4] : Small [TWNoteComboBox5] : No [TWNoteComboBox6] : Surgical [de-identified] : No [de-identified] : Macerated [de-identified] : None [de-identified] : None [de-identified] : 100% [de-identified] : No [de-identified] : Other [de-identified] : 3x Weekly [de-identified] : Primary Dressing

## 2022-02-08 ENCOUNTER — APPOINTMENT (OUTPATIENT)
Dept: WOUND CARE | Facility: HOSPITAL | Age: 77
End: 2022-02-08
Payer: MEDICARE

## 2022-02-08 ENCOUNTER — OUTPATIENT (OUTPATIENT)
Dept: OUTPATIENT SERVICES | Facility: HOSPITAL | Age: 77
LOS: 1 days | Discharge: HOME | End: 2022-02-08
Payer: MEDICARE

## 2022-02-08 VITALS
OXYGEN SATURATION: 97 % | RESPIRATION RATE: 20 BRPM | DIASTOLIC BLOOD PRESSURE: 67 MMHG | BODY MASS INDEX: 31.15 KG/M2 | WEIGHT: 230 LBS | HEART RATE: 60 BPM | HEIGHT: 72 IN | SYSTOLIC BLOOD PRESSURE: 137 MMHG | TEMPERATURE: 97.2 F

## 2022-02-08 DIAGNOSIS — L97.524 NON-PRESSURE CHRONIC ULCER OF OTHER PART OF LEFT FOOT WITH NECROSIS OF BONE: ICD-10-CM

## 2022-02-08 DIAGNOSIS — Z89.422 ACQUIRED ABSENCE OF OTHER LEFT TOE(S): ICD-10-CM

## 2022-02-08 DIAGNOSIS — E11.621 TYPE 2 DIABETES MELLITUS WITH FOOT ULCER: ICD-10-CM

## 2022-02-08 DIAGNOSIS — Z90.89 ACQUIRED ABSENCE OF OTHER ORGANS: Chronic | ICD-10-CM

## 2022-02-08 PROCEDURE — ZZZZZ: CPT

## 2022-02-08 PROCEDURE — G0463: CPT

## 2022-02-09 DIAGNOSIS — Z87.891 PERSONAL HISTORY OF NICOTINE DEPENDENCE: ICD-10-CM

## 2022-02-09 DIAGNOSIS — Z79.899 OTHER LONG TERM (CURRENT) DRUG THERAPY: ICD-10-CM

## 2022-02-09 DIAGNOSIS — L97.524 NON-PRESSURE CHRONIC ULCER OF OTHER PART OF LEFT FOOT WITH NECROSIS OF BONE: ICD-10-CM

## 2022-02-09 DIAGNOSIS — N28.9 DISORDER OF KIDNEY AND URETER, UNSPECIFIED: ICD-10-CM

## 2022-02-09 DIAGNOSIS — Z89.422 ACQUIRED ABSENCE OF OTHER LEFT TOE(S): ICD-10-CM

## 2022-02-09 DIAGNOSIS — Z79.82 LONG TERM (CURRENT) USE OF ASPIRIN: ICD-10-CM

## 2022-02-09 DIAGNOSIS — Z89.412 ACQUIRED ABSENCE OF LEFT GREAT TOE: ICD-10-CM
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
## 2022-02-09 DIAGNOSIS — E88.81 METABOLIC SYNDROME AND OTHER INSULIN RESISTANCE: ICD-10-CM

## 2022-02-09 DIAGNOSIS — Z79.84 LONG TERM (CURRENT) USE OF ORAL HYPOGLYCEMIC DRUGS: ICD-10-CM

## 2022-02-09 DIAGNOSIS — E78.2 MIXED HYPERLIPIDEMIA: ICD-10-CM

## 2022-02-09 DIAGNOSIS — T87.81 DEHISCENCE OF AMPUTATION STUMP: ICD-10-CM

## 2022-02-09 DIAGNOSIS — E11.621 TYPE 2 DIABETES MELLITUS WITH FOOT ULCER: ICD-10-CM

## 2022-02-09 DIAGNOSIS — I10 ESSENTIAL (PRIMARY) HYPERTENSION: ICD-10-CM

## 2022-02-09 DIAGNOSIS — E11.40 TYPE 2 DIABETES MELLITUS WITH DIABETIC NEUROPATHY, UNSPECIFIED: ICD-10-CM

## 2022-02-09 DIAGNOSIS — Z98.890 OTHER SPECIFIED POSTPROCEDURAL STATES: ICD-10-CM

## 2022-02-11 ENCOUNTER — APPOINTMENT (OUTPATIENT)
Dept: WOUND CARE | Facility: HOSPITAL | Age: 77
End: 2022-02-11
Payer: MEDICARE

## 2022-02-11 ENCOUNTER — OUTPATIENT (OUTPATIENT)
Dept: OUTPATIENT SERVICES | Facility: HOSPITAL | Age: 77
LOS: 1 days | Discharge: HOME | End: 2022-02-11
Payer: MEDICARE

## 2022-02-11 VITALS
SYSTOLIC BLOOD PRESSURE: 120 MMHG | TEMPERATURE: 97.4 F | DIASTOLIC BLOOD PRESSURE: 65 MMHG | RESPIRATION RATE: 18 BRPM | BODY MASS INDEX: 29.8 KG/M2 | HEART RATE: 61 BPM | HEIGHT: 72 IN | OXYGEN SATURATION: 97 % | WEIGHT: 220 LBS

## 2022-02-11 DIAGNOSIS — Z90.89 ACQUIRED ABSENCE OF OTHER ORGANS: Chronic | ICD-10-CM

## 2022-02-11 DIAGNOSIS — E11.621 TYPE 2 DIABETES MELLITUS WITH FOOT ULCER: ICD-10-CM

## 2022-02-11 PROCEDURE — G0463: CPT

## 2022-02-11 PROCEDURE — 99024 POSTOP FOLLOW-UP VISIT: CPT

## 2022-02-11 NOTE — PHYSICAL EXAM
[2+] : left 2+ [Alert] : alert [Oriented to Person] : oriented to person [Oriented to Place] : oriented to place [Calm] : calm [4 x 4] : 4 x 4

## 2022-02-11 NOTE — ASSESSMENT
[Verbalizes knowledge/Understanding] : Verbalizes knowledge/understanding [Surgery] : surgery [Labs and Tests] : labs and tests [Pain Management] : pain management [Glycemic Control] : glycemic control [Verbal] : Verbal [Written] : Written [Demo] : Demo [Patient] : Patient [Good - alert, interested, motivated] : Good - alert, interested, motivated [Demonstrates independently] : demonstrates independently [Dressing changes] : dressing changes [Foot Care] : foot care [Skin Care] : skin care [Signs and symptoms of infection] : sign and symptoms of infection [Nutrition] : nutrition [How and When to Call] : how and when to call [Off-loading] : off-loading [Patient responsibility to plan of care] : patient responsibility to plan of care [] : Yes [Stable] : stable [Home] : Home [Ambulatory] : Ambulatory [Not Applicable - Long Term Care/Home Health Agency] : Long Term Care/Home Health Agency: Not Applicable

## 2022-02-12 NOTE — HISTORY OF PRESENT ILLNESS
[FreeTextEntry1] : Pt seen s/p left 3rd toe distal amputation due to distal preulcerative changes, currently no SOI and healing well.

## 2022-02-12 NOTE — PHYSICAL EXAM
[2+] : left 2+ [Alert] : alert [Oriented to Person] : oriented to person [Oriented to Place] : oriented to place [Calm] : calm [Ankle Swelling (On Exam)] : not present [Varicose Veins Of Lower Extremities] : not present [] : not present [Skin Ulcer] : no ulcer [de-identified] : A&Ox3, NAD [de-identified] : HTN, HLD  [de-identified] : 5/5 strength in all quadrants bilaterally [de-identified] : left 3rd toe partial amputation site with sutures intact, no dehiscence, no proximal streaking, no fluctuance, no purulence [de-identified] : loss of protective sensation bilaterally , NIDDM with neuropathy  [FreeTextEntry1] : Foot 3rd Distal Digit Amp Site [FreeTextEntry2] : 2.8 [FreeTextEntry3] : 0.9 [FreeTextEntry4] : 0.2 [de-identified] : sanguineous  [de-identified] : mild [de-identified] : Alginate Ag [de-identified] : NSC\par DD\par  [TWNoteComboBox1] : Left [TWNoteComboBox4] : Small [TWNoteComboBox5] : No [TWNoteComboBox6] : Surgical [de-identified] : No [de-identified] : Macerated [de-identified] : None [de-identified] : None [de-identified] : 100% [de-identified] : No [de-identified] : False [de-identified] : 3x Weekly [de-identified] : Primary Dressing

## 2022-02-12 NOTE — REVIEW OF SYSTEMS
[Joint Stiffness] : joint stiffness [Fever] : no fever [Chills] : no chills [Eye Pain] : no eye pain [Loss Of Hearing] : no hearing loss [Shortness Of Breath] : no shortness of breath [Wheezing] : no wheezing [Abdominal Pain] : no abdominal pain [Skin Wound] : no skin wound [Anxiety] : no anxiety [Easy Bleeding] : no tendency for easy bleeding [Easy Bruising] : no tendency for easy bruising [FreeTextEntry5] : HLD, HTN [de-identified] : all amp sites left foot closed , 3rd toe has pre ulcerative callus formation  [de-identified] : NIDDM with neuropathy  [de-identified] : NIDDM

## 2022-02-12 NOTE — ASSESSMENT
[] : No [FreeTextEntry2] : Infection prevention \par Wound care (dressing changes)\par Maintain optimal skin integrity to high pressure areas\par Compression therapy [FreeTextEntry4] : F/u Wednesday, 2/16/22 for assessment and possible removal of stitches per DPM

## 2022-02-16 ENCOUNTER — APPOINTMENT (OUTPATIENT)
Dept: WOUND CARE | Facility: HOSPITAL | Age: 77
End: 2022-02-16
Payer: MEDICARE

## 2022-02-16 ENCOUNTER — OUTPATIENT (OUTPATIENT)
Dept: OUTPATIENT SERVICES | Facility: HOSPITAL | Age: 77
LOS: 1 days | Discharge: HOME | End: 2022-02-16
Payer: MEDICARE

## 2022-02-16 VITALS
HEIGHT: 72 IN | OXYGEN SATURATION: 97 % | SYSTOLIC BLOOD PRESSURE: 156 MMHG | WEIGHT: 220 LBS | BODY MASS INDEX: 29.8 KG/M2 | DIASTOLIC BLOOD PRESSURE: 74 MMHG | HEART RATE: 76 BPM | RESPIRATION RATE: 22 BRPM | TEMPERATURE: 96.8 F

## 2022-02-16 DIAGNOSIS — N28.9 DISORDER OF KIDNEY AND URETER, UNSPECIFIED: ICD-10-CM

## 2022-02-16 DIAGNOSIS — I10 ESSENTIAL (PRIMARY) HYPERTENSION: ICD-10-CM

## 2022-02-16 DIAGNOSIS — E88.81 METABOLIC SYNDROME AND OTHER INSULIN RESISTANCE: ICD-10-CM

## 2022-02-16 DIAGNOSIS — Z79.84 LONG TERM (CURRENT) USE OF ORAL HYPOGLYCEMIC DRUGS: ICD-10-CM

## 2022-02-16 DIAGNOSIS — E11.40 TYPE 2 DIABETES MELLITUS WITH DIABETIC NEUROPATHY, UNSPECIFIED: ICD-10-CM

## 2022-02-16 DIAGNOSIS — Z89.412 ACQUIRED ABSENCE OF LEFT GREAT TOE: ICD-10-CM

## 2022-02-16 DIAGNOSIS — L97.411 NON-PRESSURE CHRONIC ULCER OF RIGHT HEEL AND MIDFOOT LIMITED TO BREAKDOWN OF SKIN: ICD-10-CM

## 2022-02-16 DIAGNOSIS — Z90.89 ACQUIRED ABSENCE OF OTHER ORGANS: Chronic | ICD-10-CM

## 2022-02-16 DIAGNOSIS — E11.621 TYPE 2 DIABETES MELLITUS WITH FOOT ULCER: ICD-10-CM

## 2022-02-16 DIAGNOSIS — E78.2 MIXED HYPERLIPIDEMIA: ICD-10-CM

## 2022-02-16 DIAGNOSIS — Z79.899 OTHER LONG TERM (CURRENT) DRUG THERAPY: ICD-10-CM

## 2022-02-16 DIAGNOSIS — L97.524 NON-PRESSURE CHRONIC ULCER OF OTHER PART OF LEFT FOOT WITH NECROSIS OF BONE: ICD-10-CM

## 2022-02-16 DIAGNOSIS — Z98.890 OTHER SPECIFIED POSTPROCEDURAL STATES: ICD-10-CM

## 2022-02-16 DIAGNOSIS — Z87.891 PERSONAL HISTORY OF NICOTINE DEPENDENCE: ICD-10-CM

## 2022-02-16 DIAGNOSIS — Z89.422 ACQUIRED ABSENCE OF OTHER LEFT TOE(S): ICD-10-CM

## 2022-02-16 DIAGNOSIS — Z79.82 LONG TERM (CURRENT) USE OF ASPIRIN: ICD-10-CM

## 2022-02-16 PROCEDURE — G0463: CPT

## 2022-02-16 PROCEDURE — 99213 OFFICE O/P EST LOW 20 MIN: CPT

## 2022-02-16 NOTE — PLAN
[FreeTextEntry1] : 3rd toe steri stripped , PTR 1 week , no soi , slight clean , dehiscence of the suture line medially Spent 20 minutes for patient care and medical decision making.\par

## 2022-02-16 NOTE — REVIEW OF SYSTEMS
[Joint Stiffness] : joint stiffness [Fever] : no fever [Chills] : no chills [Eye Pain] : no eye pain [Loss Of Hearing] : no hearing loss [Shortness Of Breath] : no shortness of breath [Wheezing] : no wheezing [Abdominal Pain] : no abdominal pain [Skin Wound] : no skin wound [Anxiety] : no anxiety [Easy Bleeding] : no tendency for easy bleeding [Easy Bruising] : no tendency for easy bruising [FreeTextEntry5] : HLD, HTN [de-identified] : 3rd toe left slight dehiscence , sutures removed and steri strips used , no soi  [de-identified] : NIDDM with neuropathy  [de-identified] : NIDDM

## 2022-02-16 NOTE — PHYSICAL EXAM
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 toe partial amputation site with sutures intact,  dehiscence, no proximal streaking, no fluctuance, no purulence [de-identified] : loss of protective sensation bilaterally , NIDDM with neuropathy  [de-identified] : DPM removed sutures at visit \par Steri Strips placed\par DPM cauterized wound at visit with silver nitrate stick [FreeTextEntry1] : Foot 3rd Distal Digit Amp Site [FreeTextEntry2] : 1.0 [FreeTextEntry3] : 0.4 [FreeTextEntry4] : 0.1 [de-identified] : sanguineous  [de-identified] : Dry Dressing [de-identified] : NSC\par DD\par  [TWNoteComboBox1] : Left [TWNoteComboBox4] : Small [TWNoteComboBox5] : No [TWNoteComboBox6] : Surgical [de-identified] : No [de-identified] : Normal [de-identified] : None [de-identified] : None [de-identified] : 100% [de-identified] : No [de-identified] : Weekly [de-identified] : Secondary Dressing

## 2022-02-16 NOTE — ASSESSMENT
[Verbal] : Verbal [Written] : Written [Demo] : Demo [Patient] : Patient [Good - alert, interested, motivated] : Good - alert, interested, motivated [Demonstrates independently] : demonstrates independently [Dressing changes] : dressing changes [Foot Care] : foot care [Skin Care] : skin care [Signs and symptoms of infection] : sign and symptoms of infection [Nutrition] : nutrition [How and When to Call] : how and when to call [Off-loading] : off-loading [Patient responsibility to plan of care] : patient responsibility to plan of care [Stable] : stable [Home] : Home [Ambulatory] : Ambulatory [Not Applicable - Long Term Care/Home Health Agency] : Long Term Care/Home Health Agency: Not Applicable [] : No [FreeTextEntry2] : Infection prevention \par Wound care (dressing changes)\par Maintain optimal skin integrity to high pressure areas\par  [FreeTextEntry4] : F/u in 1 week

## 2022-02-22 ENCOUNTER — NON-APPOINTMENT (OUTPATIENT)
Age: 77
End: 2022-02-22

## 2022-02-22 NOTE — PATIENT PROFILE ADULT - FALL HARM RISK
- n/A

## 2022-02-23 ENCOUNTER — APPOINTMENT (OUTPATIENT)
Dept: WOUND CARE | Facility: HOSPITAL | Age: 77
End: 2022-02-23
Payer: MEDICARE

## 2022-02-23 ENCOUNTER — OUTPATIENT (OUTPATIENT)
Dept: OUTPATIENT SERVICES | Facility: HOSPITAL | Age: 77
LOS: 1 days | Discharge: HOME | End: 2022-02-23
Payer: MEDICARE

## 2022-02-23 VITALS
HEIGHT: 72 IN | DIASTOLIC BLOOD PRESSURE: 64 MMHG | BODY MASS INDEX: 29.8 KG/M2 | HEART RATE: 75 BPM | SYSTOLIC BLOOD PRESSURE: 103 MMHG | OXYGEN SATURATION: 98 % | WEIGHT: 220 LBS | TEMPERATURE: 97.2 F | RESPIRATION RATE: 22 BRPM

## 2022-02-23 DIAGNOSIS — Z87.891 PERSONAL HISTORY OF NICOTINE DEPENDENCE: ICD-10-CM

## 2022-02-23 DIAGNOSIS — E11.40 TYPE 2 DIABETES MELLITUS WITH DIABETIC NEUROPATHY, UNSPECIFIED: ICD-10-CM

## 2022-02-23 DIAGNOSIS — E11.621 TYPE 2 DIABETES MELLITUS WITH FOOT ULCER: ICD-10-CM

## 2022-02-23 DIAGNOSIS — E78.2 MIXED HYPERLIPIDEMIA: ICD-10-CM

## 2022-02-23 DIAGNOSIS — Z79.899 OTHER LONG TERM (CURRENT) DRUG THERAPY: ICD-10-CM

## 2022-02-23 DIAGNOSIS — Z79.84 LONG TERM (CURRENT) USE OF ORAL HYPOGLYCEMIC DRUGS: ICD-10-CM

## 2022-02-23 DIAGNOSIS — E88.81 METABOLIC SYNDROME AND OTHER INSULIN RESISTANCE: ICD-10-CM

## 2022-02-23 DIAGNOSIS — N28.9 DISORDER OF KIDNEY AND URETER, UNSPECIFIED: ICD-10-CM

## 2022-02-23 DIAGNOSIS — L97.524 NON-PRESSURE CHRONIC ULCER OF OTHER PART OF LEFT FOOT WITH NECROSIS OF BONE: ICD-10-CM

## 2022-02-23 DIAGNOSIS — Z79.82 LONG TERM (CURRENT) USE OF ASPIRIN: ICD-10-CM

## 2022-02-23 DIAGNOSIS — I10 ESSENTIAL (PRIMARY) HYPERTENSION: ICD-10-CM

## 2022-02-23 DIAGNOSIS — Z89.412 ACQUIRED ABSENCE OF LEFT GREAT TOE: ICD-10-CM

## 2022-02-23 DIAGNOSIS — Z89.422 ACQUIRED ABSENCE OF OTHER LEFT TOE(S): ICD-10-CM

## 2022-02-23 DIAGNOSIS — Z90.89 ACQUIRED ABSENCE OF OTHER ORGANS: Chronic | ICD-10-CM

## 2022-02-23 DIAGNOSIS — Z98.890 OTHER SPECIFIED POSTPROCEDURAL STATES: ICD-10-CM

## 2022-02-23 PROCEDURE — 99212 OFFICE O/P EST SF 10 MIN: CPT

## 2022-02-23 PROCEDURE — G0463: CPT

## 2022-02-23 NOTE — VITALS
[Pain related to present condition?] : The patient's  pain is not related to present condition. [] : No [de-identified] : 0/10

## 2022-02-23 NOTE — ASSESSMENT
[Verbal] : Verbal [Written] : Written [Demo] : Demo [Patient] : Patient [Good - alert, interested, motivated] : Good - alert, interested, motivated [Demonstrates independently] : demonstrates independently [Dressing changes] : dressing changes [Foot Care] : foot care [Skin Care] : skin care [Signs and symptoms of infection] : sign and symptoms of infection [Nutrition] : nutrition [How and When to Call] : how and when to call [Off-loading] : off-loading [Patient responsibility to plan of care] : patient responsibility to plan of care [Stable] : stable [Home] : Home [Ambulatory] : Ambulatory [Not Applicable - Long Term Care/Home Health Agency] : Long Term Care/Home Health Agency: Not Applicable [] : No [FreeTextEntry2] : Infection prevention \par Wound care (dressing changes)\par Maintain optimal skin integrity to high pressure areas\par F/U 2 weeks [FreeTextEntry4] : DPM recommended the patient continue oral antibiotic regimen for another week\par DPM escribed Mupirocin, patient is aware of need to  prescription\par F/U 2 weeks

## 2022-02-23 NOTE — PHYSICAL EXAM
[2 x 2] : 2 x 2  [2+] : left 2+ [Ankle Swelling (On Exam)] : not present [Varicose Veins Of Lower Extremities] : not present [] : not present [Skin Ulcer] : no ulcer [Alert] : alert [Oriented to Person] : oriented to person [Oriented to Place] : oriented to place [Calm] : calm [de-identified] : A&Ox3, NAD [de-identified] : HTN, HLD  [de-identified] : 5/5 strength in all quadrants bilaterally [de-identified] : left 3rd toe partial amputation ,  closed , no proximal streaking, no fluctuance, no purulence [de-identified] : loss of protective sensation bilaterally , NIDDM with neuropathy  [FreeTextEntry1] : Foot 3rd Distal Digit Amp Site [FreeTextEntry2] : 0.7 [FreeTextEntry3] : 0.4 [FreeTextEntry4] : 0.1 [de-identified] : sanguineous  [de-identified] : Mupirocin [de-identified] : Cleansed with Normal saline\par \par  [TWNoteComboBox1] : Left [TWNoteComboBox4] : Small [TWNoteComboBox5] : No [TWNoteComboBox6] : Surgical [de-identified] : No [de-identified] : Normal [de-identified] : None [de-identified] : None [de-identified] : 100% [de-identified] : No [de-identified] : Daily [de-identified] : Secondary Dressing

## 2022-02-23 NOTE — REVIEW OF SYSTEMS
[Fever] : no fever [Chills] : no chills [Eye Pain] : no eye pain [Loss Of Hearing] : no hearing loss [Shortness Of Breath] : no shortness of breath [Wheezing] : no wheezing [Abdominal Pain] : no abdominal pain [Joint Stiffness] : joint stiffness [Skin Wound] : no skin wound [Anxiety] : no anxiety [Easy Bleeding] : no tendency for easy bleeding [Easy Bruising] : no tendency for easy bruising [FreeTextEntry5] : HLD, HTN [de-identified] : 3rd toe left s/p distal amp  , no soi , healing well , decreased swelling   [de-identified] : NIDDM with neuropathy  [de-identified] : NIDDM

## 2022-02-23 NOTE — PLAN
[FreeTextEntry1] : continue protective dressing , patient able to shower and return to shoe gear, PTR 2 weeks  Spent 20 minutes for patient care and medical decision making.\par

## 2022-03-08 NOTE — PLAN
PACU
[FreeTextEntry1] : Patient examined and evaluated at this time.\par Continue local wound care and offloading.\par Spent 20 minutes for patient care and medical decision making.\par Patient to follow up in 1 week.\par

## 2022-03-09 ENCOUNTER — OUTPATIENT (OUTPATIENT)
Dept: OUTPATIENT SERVICES | Facility: HOSPITAL | Age: 77
LOS: 1 days | Discharge: HOME | End: 2022-03-09
Payer: MEDICARE

## 2022-03-09 ENCOUNTER — APPOINTMENT (OUTPATIENT)
Dept: WOUND CARE | Facility: HOSPITAL | Age: 77
End: 2022-03-09
Payer: MEDICARE

## 2022-03-09 VITALS
WEIGHT: 220 LBS | SYSTOLIC BLOOD PRESSURE: 103 MMHG | TEMPERATURE: 97.4 F | HEIGHT: 72 IN | HEART RATE: 83 BPM | RESPIRATION RATE: 20 BRPM | OXYGEN SATURATION: 100 % | BODY MASS INDEX: 29.8 KG/M2 | DIASTOLIC BLOOD PRESSURE: 57 MMHG

## 2022-03-09 DIAGNOSIS — E11.621 TYPE 2 DIABETES MELLITUS WITH FOOT ULCER: ICD-10-CM

## 2022-03-09 DIAGNOSIS — L97.422 NON-PRESSURE CHRONIC ULCER OF LEFT HEEL AND MIDFOOT WITH FAT LAYER EXPOSED: ICD-10-CM

## 2022-03-09 DIAGNOSIS — Z90.89 ACQUIRED ABSENCE OF OTHER ORGANS: Chronic | ICD-10-CM

## 2022-03-09 DIAGNOSIS — L97.524 NON-PRESSURE CHRONIC ULCER OF OTHER PART OF LEFT FOOT WITH NECROSIS OF BONE: ICD-10-CM

## 2022-03-09 PROCEDURE — G0463: CPT

## 2022-03-09 PROCEDURE — 99213 OFFICE O/P EST LOW 20 MIN: CPT

## 2022-03-09 NOTE — REVIEW OF SYSTEMS
[Fever] : no fever [Chills] : no chills [Eye Pain] : no eye pain [Loss Of Hearing] : no hearing loss [Shortness Of Breath] : no shortness of breath [Wheezing] : no wheezing [Abdominal Pain] : no abdominal pain [Joint Stiffness] : joint stiffness [Skin Wound] : no skin wound [Anxiety] : no anxiety [Easy Bleeding] : no tendency for easy bleeding [Easy Bruising] : no tendency for easy bruising [FreeTextEntry5] : HLD, HTN [de-identified] : 3rd toe left s/p distal amp  , no soi , healing well , decreased swelling   [de-identified] : NIDDM with neuropathy  [de-identified] : NIDDM

## 2022-03-09 NOTE — PHYSICAL EXAM
[2+] : left 2+ [Ankle Swelling (On Exam)] : not present [Varicose Veins Of Lower Extremities] : not present [] : not present [Skin Ulcer] : no ulcer [Alert] : alert [Oriented to Person] : oriented to person [Oriented to Place] : oriented to place [Calm] : calm [de-identified] : A&Ox3, NAD [de-identified] : HTN, HLD  [de-identified] : 5/5 strength in all quadrants bilaterally [de-identified] : loss of protective sensation bilaterally , NIDDM with neuropathy  [de-identified] : left 3rd toe partial amputation ,  closed , no proximal streaking, no fluctuance, no purulence [de-identified] : No Treatment  [FreeTextEntry1] : Foot 3rd Distal Digit Amp Site- Resolved [de-identified] : Cleansed with Normal saline\par \par  [TWNoteComboBox1] : Left [TWNoteComboBox4] : None [TWNoteComboBox6] : Surgical [TWNoteComboBox5] : No [de-identified] : No [de-identified] : Normal [de-identified] : None [de-identified] : None [de-identified] : 100% [de-identified] : No [de-identified] : Daily [de-identified] : Secondary Dressing

## 2022-03-09 NOTE — ASSESSMENT
[Verbal] : Verbal [Written] : Written [Demo] : Demo [Patient] : Patient [Good - alert, interested, motivated] : Good - alert, interested, motivated [Demonstrates independently] : demonstrates independently [Foot Care] : foot care [Skin Care] : skin care [Signs and symptoms of infection] : sign and symptoms of infection [How and When to Call] : how and when to call [Patient responsibility to plan of care] : patient responsibility to plan of care [] : Yes [Stable] : stable [Home] : Home [Ambulatory] : Ambulatory [Not Applicable - Long Term Care/Home Health Agency] : Long Term Care/Home Health Agency: Not Applicable [Nutrition] : nutrition [Discharge Planning] : discharge planning [FreeTextEntry2] : Maintain Skin Integrity \par Hydration [FreeTextEntry4] : Pt completed Antibiotic Regimen 3/2/22.\par Pt Discharged from [facility]

## 2022-03-09 NOTE — PLAN
[FreeTextEntry1] : Patient happy with the results , all healed , patient discharged with full instructions  Spent 20 minutes for patient care and medical decision making.\par

## 2022-03-09 NOTE — VITALS
[] : No [de-identified] : No pain 0/10  [FreeTextEntry5] : Pts taking a new med for his Cholesterol. Unaware of name or dosage. Pt to bring to next wound care visit

## 2022-03-10 DIAGNOSIS — Z89.412 ACQUIRED ABSENCE OF LEFT GREAT TOE: ICD-10-CM

## 2022-03-10 DIAGNOSIS — Z89.422 ACQUIRED ABSENCE OF OTHER LEFT TOE(S): ICD-10-CM

## 2022-03-10 DIAGNOSIS — E11.40 TYPE 2 DIABETES MELLITUS WITH DIABETIC NEUROPATHY, UNSPECIFIED: ICD-10-CM

## 2022-03-10 DIAGNOSIS — E88.81 METABOLIC SYNDROME AND OTHER INSULIN RESISTANCE: ICD-10-CM

## 2022-03-10 DIAGNOSIS — Z87.891 PERSONAL HISTORY OF NICOTINE DEPENDENCE: ICD-10-CM

## 2022-03-10 DIAGNOSIS — I10 ESSENTIAL (PRIMARY) HYPERTENSION: ICD-10-CM

## 2022-03-10 DIAGNOSIS — Z79.84 LONG TERM (CURRENT) USE OF ORAL HYPOGLYCEMIC DRUGS: ICD-10-CM

## 2022-03-10 DIAGNOSIS — E11.621 TYPE 2 DIABETES MELLITUS WITH FOOT ULCER: ICD-10-CM

## 2022-03-10 DIAGNOSIS — L97.524 NON-PRESSURE CHRONIC ULCER OF OTHER PART OF LEFT FOOT WITH NECROSIS OF BONE: ICD-10-CM

## 2022-03-10 DIAGNOSIS — Z98.890 OTHER SPECIFIED POSTPROCEDURAL STATES: ICD-10-CM

## 2022-03-10 DIAGNOSIS — E78.2 MIXED HYPERLIPIDEMIA: ICD-10-CM

## 2022-03-10 DIAGNOSIS — Z79.899 OTHER LONG TERM (CURRENT) DRUG THERAPY: ICD-10-CM

## 2022-03-10 DIAGNOSIS — Z79.82 LONG TERM (CURRENT) USE OF ASPIRIN: ICD-10-CM

## 2022-03-10 DIAGNOSIS — N28.9 DISORDER OF KIDNEY AND URETER, UNSPECIFIED: ICD-10-CM

## 2023-02-16 ENCOUNTER — EMERGENCY (EMERGENCY)
Facility: HOSPITAL | Age: 78
LOS: 1 days | Discharge: HOME | End: 2023-02-16
Attending: EMERGENCY MEDICINE
Payer: MEDICARE

## 2023-02-16 VITALS
HEART RATE: 85 BPM | TEMPERATURE: 99 F | SYSTOLIC BLOOD PRESSURE: 106 MMHG | DIASTOLIC BLOOD PRESSURE: 65 MMHG | RESPIRATION RATE: 18 BRPM | OXYGEN SATURATION: 100 %

## 2023-02-16 VITALS
DIASTOLIC BLOOD PRESSURE: 63 MMHG | TEMPERATURE: 98 F | OXYGEN SATURATION: 96 % | RESPIRATION RATE: 20 BRPM | SYSTOLIC BLOOD PRESSURE: 114 MMHG | WEIGHT: 121.03 LBS | HEIGHT: 72 IN | HEART RATE: 96 BPM

## 2023-02-16 DIAGNOSIS — Z90.89 ACQUIRED ABSENCE OF OTHER ORGANS: Chronic | ICD-10-CM

## 2023-02-16 LAB
ALBUMIN SERPL ELPH-MCNC: 4.2 G/DL — SIGNIFICANT CHANGE UP (ref 3.3–5)
ALP SERPL-CCNC: 56 U/L — SIGNIFICANT CHANGE UP (ref 40–120)
ALT FLD-CCNC: 14 U/L — SIGNIFICANT CHANGE UP (ref 10–45)
ANION GAP SERPL CALC-SCNC: 14 MMOL/L — SIGNIFICANT CHANGE UP (ref 5–17)
APPEARANCE UR: ABNORMAL
APTT BLD: 32.1 SEC — SIGNIFICANT CHANGE UP (ref 27.5–35.5)
AST SERPL-CCNC: 15 U/L — SIGNIFICANT CHANGE UP (ref 10–40)
BACTERIA # UR AUTO: NEGATIVE — SIGNIFICANT CHANGE UP
BASE EXCESS BLDV CALC-SCNC: 1.4 MMOL/L — SIGNIFICANT CHANGE UP (ref -2–3)
BASOPHILS # BLD AUTO: 1.32 K/UL — HIGH (ref 0–0.2)
BASOPHILS NFR BLD AUTO: 2.6 % — HIGH (ref 0–2)
BILIRUB SERPL-MCNC: 1 MG/DL — SIGNIFICANT CHANGE UP (ref 0.2–1.2)
BILIRUB UR-MCNC: NEGATIVE — SIGNIFICANT CHANGE UP
BUN SERPL-MCNC: 38 MG/DL — HIGH (ref 7–23)
CA-I SERPL-SCNC: 1.27 MMOL/L — SIGNIFICANT CHANGE UP (ref 1.15–1.33)
CALCIUM SERPL-MCNC: 10 MG/DL — SIGNIFICANT CHANGE UP (ref 8.4–10.5)
CHLORIDE BLDV-SCNC: 100 MMOL/L — SIGNIFICANT CHANGE UP (ref 96–108)
CHLORIDE SERPL-SCNC: 100 MMOL/L — SIGNIFICANT CHANGE UP (ref 96–108)
CO2 BLDV-SCNC: 28 MMOL/L — HIGH (ref 22–26)
CO2 SERPL-SCNC: 23 MMOL/L — SIGNIFICANT CHANGE UP (ref 22–31)
COLOR SPEC: ABNORMAL
CREAT SERPL-MCNC: 2.07 MG/DL — HIGH (ref 0.5–1.3)
DIFF PNL FLD: ABNORMAL
EGFR: 32 ML/MIN/1.73M2 — LOW
EOSINOPHIL # BLD AUTO: 0 K/UL — SIGNIFICANT CHANGE UP (ref 0–0.5)
EOSINOPHIL NFR BLD AUTO: 0 % — SIGNIFICANT CHANGE UP (ref 0–6)
EPI CELLS # UR: 0 /HPF — SIGNIFICANT CHANGE UP
GAS PNL BLDV: 132 MMOL/L — LOW (ref 136–145)
GAS PNL BLDV: SIGNIFICANT CHANGE UP
GAS PNL BLDV: SIGNIFICANT CHANGE UP
GLUCOSE BLDV-MCNC: 261 MG/DL — HIGH (ref 70–99)
GLUCOSE SERPL-MCNC: 256 MG/DL — HIGH (ref 70–99)
GLUCOSE UR QL: ABNORMAL
HCO3 BLDV-SCNC: 27 MMOL/L — SIGNIFICANT CHANGE UP (ref 22–29)
HCT VFR BLD CALC: 47.2 % — SIGNIFICANT CHANGE UP (ref 39–50)
HCT VFR BLDA CALC: 48 % — SIGNIFICANT CHANGE UP (ref 39–51)
HGB BLD CALC-MCNC: 15.9 G/DL — SIGNIFICANT CHANGE UP (ref 12.6–17.4)
HGB BLD-MCNC: 15.5 G/DL — SIGNIFICANT CHANGE UP (ref 13–17)
HYALINE CASTS # UR AUTO: 4 /LPF — HIGH (ref 0–2)
INR BLD: 1.2 RATIO — HIGH (ref 0.88–1.16)
KETONES UR-MCNC: SIGNIFICANT CHANGE UP
LACTATE BLDV-MCNC: 2.5 MMOL/L — HIGH (ref 0.5–2)
LEUKOCYTE ESTERASE UR-ACNC: NEGATIVE — SIGNIFICANT CHANGE UP
LYMPHOCYTES # BLD AUTO: 29.73 K/UL — HIGH (ref 1–3.3)
LYMPHOCYTES # BLD AUTO: 58.6 % — HIGH (ref 13–44)
MAGNESIUM SERPL-MCNC: 1.9 MG/DL — SIGNIFICANT CHANGE UP (ref 1.6–2.6)
MANUAL SMEAR VERIFICATION: SIGNIFICANT CHANGE UP
MCHC RBC-ENTMCNC: 29 PG — SIGNIFICANT CHANGE UP (ref 27–34)
MCHC RBC-ENTMCNC: 32.8 GM/DL — SIGNIFICANT CHANGE UP (ref 32–36)
MCV RBC AUTO: 88.2 FL — SIGNIFICANT CHANGE UP (ref 80–100)
MONOCYTES # BLD AUTO: 2.18 K/UL — HIGH (ref 0–0.9)
MONOCYTES NFR BLD AUTO: 4.3 % — SIGNIFICANT CHANGE UP (ref 2–14)
NEUTROPHILS # BLD AUTO: 17.51 K/UL — HIGH (ref 1.8–7.4)
NEUTROPHILS NFR BLD AUTO: 34.5 % — LOW (ref 43–77)
NITRITE UR-MCNC: NEGATIVE — SIGNIFICANT CHANGE UP
PCO2 BLDV: 43 MMHG — SIGNIFICANT CHANGE UP (ref 42–55)
PH BLDV: 7.4 — SIGNIFICANT CHANGE UP (ref 7.32–7.43)
PH UR: 5.5 — SIGNIFICANT CHANGE UP (ref 5–8)
PHOSPHATE SERPL-MCNC: 4 MG/DL — SIGNIFICANT CHANGE UP (ref 2.5–4.5)
PLAT MORPH BLD: NORMAL — SIGNIFICANT CHANGE UP
PLATELET # BLD AUTO: 185 K/UL — SIGNIFICANT CHANGE UP (ref 150–400)
PO2 BLDV: 16 MMHG — LOW (ref 25–45)
POTASSIUM BLDV-SCNC: 4.5 MMOL/L — SIGNIFICANT CHANGE UP (ref 3.5–5.1)
POTASSIUM SERPL-MCNC: 4.6 MMOL/L — SIGNIFICANT CHANGE UP (ref 3.5–5.3)
POTASSIUM SERPL-SCNC: 4.6 MMOL/L — SIGNIFICANT CHANGE UP (ref 3.5–5.3)
PROT SERPL-MCNC: 7.8 G/DL — SIGNIFICANT CHANGE UP (ref 6–8.3)
PROT UR-MCNC: ABNORMAL
PROTHROM AB SERPL-ACNC: 13.8 SEC — HIGH (ref 10.5–13.4)
RBC # BLD: 5.35 M/UL — SIGNIFICANT CHANGE UP (ref 4.2–5.8)
RBC # FLD: 15 % — HIGH (ref 10.3–14.5)
RBC BLD AUTO: SIGNIFICANT CHANGE UP
RBC CASTS # UR COMP ASSIST: 49 /HPF — HIGH (ref 0–4)
SAO2 % BLDV: 19.1 % — LOW (ref 67–88)
SODIUM SERPL-SCNC: 137 MMOL/L — SIGNIFICANT CHANGE UP (ref 135–145)
SP GR SPEC: 1.02 — SIGNIFICANT CHANGE UP (ref 1.01–1.02)
TROPONIN T, HIGH SENSITIVITY RESULT: 61 NG/L — HIGH (ref 0–51)
UROBILINOGEN FLD QL: NEGATIVE — SIGNIFICANT CHANGE UP
WBC # BLD: 50.74 K/UL — CRITICAL HIGH (ref 3.8–10.5)
WBC # FLD AUTO: 50.74 K/UL — CRITICAL HIGH (ref 3.8–10.5)
WBC UR QL: 22 /HPF — HIGH (ref 0–5)

## 2023-02-16 PROCEDURE — 82565 ASSAY OF CREATININE: CPT

## 2023-02-16 PROCEDURE — 84100 ASSAY OF PHOSPHORUS: CPT

## 2023-02-16 PROCEDURE — 74176 CT ABD & PELVIS W/O CONTRAST: CPT | Mod: 26,MA

## 2023-02-16 PROCEDURE — 82330 ASSAY OF CALCIUM: CPT

## 2023-02-16 PROCEDURE — 82435 ASSAY OF BLOOD CHLORIDE: CPT

## 2023-02-16 PROCEDURE — 99284 EMERGENCY DEPT VISIT MOD MDM: CPT | Mod: FS

## 2023-02-16 PROCEDURE — 71045 X-RAY EXAM CHEST 1 VIEW: CPT | Mod: 26

## 2023-02-16 PROCEDURE — 83735 ASSAY OF MAGNESIUM: CPT

## 2023-02-16 PROCEDURE — 85014 HEMATOCRIT: CPT

## 2023-02-16 PROCEDURE — 99285 EMERGENCY DEPT VISIT HI MDM: CPT | Mod: 25

## 2023-02-16 PROCEDURE — 81001 URINALYSIS AUTO W/SCOPE: CPT

## 2023-02-16 PROCEDURE — 80053 COMPREHEN METABOLIC PANEL: CPT

## 2023-02-16 PROCEDURE — 85025 COMPLETE CBC W/AUTO DIFF WBC: CPT

## 2023-02-16 PROCEDURE — 84484 ASSAY OF TROPONIN QUANT: CPT

## 2023-02-16 PROCEDURE — 82803 BLOOD GASES ANY COMBINATION: CPT

## 2023-02-16 PROCEDURE — 83605 ASSAY OF LACTIC ACID: CPT

## 2023-02-16 PROCEDURE — 85018 HEMOGLOBIN: CPT

## 2023-02-16 PROCEDURE — 71045 X-RAY EXAM CHEST 1 VIEW: CPT

## 2023-02-16 PROCEDURE — 87086 URINE CULTURE/COLONY COUNT: CPT

## 2023-02-16 PROCEDURE — 74176 CT ABD & PELVIS W/O CONTRAST: CPT | Mod: MA

## 2023-02-16 PROCEDURE — 85730 THROMBOPLASTIN TIME PARTIAL: CPT

## 2023-02-16 PROCEDURE — 85610 PROTHROMBIN TIME: CPT

## 2023-02-16 PROCEDURE — 36415 COLL VENOUS BLD VENIPUNCTURE: CPT

## 2023-02-16 PROCEDURE — 84132 ASSAY OF SERUM POTASSIUM: CPT

## 2023-02-16 PROCEDURE — 93005 ELECTROCARDIOGRAM TRACING: CPT

## 2023-02-16 PROCEDURE — 82947 ASSAY GLUCOSE BLOOD QUANT: CPT

## 2023-02-16 PROCEDURE — 84295 ASSAY OF SERUM SODIUM: CPT

## 2023-02-16 RX ORDER — ONDANSETRON 8 MG/1
1 TABLET, FILM COATED ORAL
Qty: 9 | Refills: 0
Start: 2023-02-16 | End: 2023-02-18

## 2023-02-16 RX ORDER — IBUPROFEN 200 MG
400 TABLET ORAL ONCE
Refills: 0 | Status: COMPLETED | OUTPATIENT
Start: 2023-02-16 | End: 2023-02-16

## 2023-02-16 RX ORDER — ACETAMINOPHEN 500 MG
975 TABLET ORAL ONCE
Refills: 0 | Status: COMPLETED | OUTPATIENT
Start: 2023-02-16 | End: 2023-02-16

## 2023-02-16 RX ORDER — SODIUM CHLORIDE 9 MG/ML
1000 INJECTION INTRAMUSCULAR; INTRAVENOUS; SUBCUTANEOUS ONCE
Refills: 0 | Status: COMPLETED | OUTPATIENT
Start: 2023-02-16 | End: 2023-02-16

## 2023-02-16 RX ORDER — CEFPODOXIME PROXETIL 100 MG
1 TABLET ORAL
Qty: 14 | Refills: 0
Start: 2023-02-16 | End: 2023-02-22

## 2023-02-16 RX ORDER — OXYCODONE AND ACETAMINOPHEN 5; 325 MG/1; MG/1
1 TABLET ORAL
Qty: 6 | Refills: 0
Start: 2023-02-16

## 2023-02-16 RX ADMIN — Medication 400 MILLIGRAM(S): at 18:40

## 2023-02-16 RX ADMIN — SODIUM CHLORIDE 1000 MILLILITER(S): 9 INJECTION INTRAMUSCULAR; INTRAVENOUS; SUBCUTANEOUS at 20:11

## 2023-02-16 RX ADMIN — Medication 975 MILLIGRAM(S): at 18:40

## 2023-02-16 NOTE — ED PROVIDER NOTE - TOBACCO USE
PROVIDER:[TOKEN:[[id number]],FOLLOWUP:[1 week]],PROVIDER:[TOKEN:[[id number]],FOLLOWUP:[Routine]],PROVIDER:[TOKEN:[[id number]],FOLLOWUP:[1 week],ESTABLISHEDPATIENT:[T]] Unknown if ever smoked

## 2023-02-16 NOTE — ED PROVIDER NOTE - PRO INTERPRETER NEED 2
Spoke with pt was able to get her scheduled for 12/9/19 Pt voiced understanding with no questions.   English

## 2023-02-16 NOTE — ED ADULT NURSE NOTE - ISOLATION TYPE:
None Banner Transposition Flap Text: The defect edges were debeveled with a #15 scalpel blade.  Given the location of the defect and the proximity to free margins a Banner transposition flap was deemed most appropriate.  Using a sterile surgical marker, an appropriate flap drawn around the defect. The area thus outlined was incised deep to adipose tissue with a #15 scalpel blade.  The skin margins were undermined to an appropriate distance in all directions utilizing iris scissors.

## 2023-02-16 NOTE — ED PROVIDER NOTE - RAPID ASSESSMENT
Pt with DMII, leukemia not requiring treatment. Patient presents with blood in the urine, throwing up last night and reports he had blood in the throw up and a pain on the left side of the mid back with a history of 'sand in the kidneys'. Patient states that he has constant blood in the urine at this time. Pt with DMII, leukemia not requiring treatment. Patient presents with blood in the urine, throwing up last night and reports he had blood in the throw up and a pain on the left side of the mid back with a history of 'sand in the kidneys'. Patient states that he has constant blood in the urine at this time.  Atilio Thompson MD, FACEP: I saw the patient waiting area; a brief history was taken and a thorough physical exam was not performed as there is no physical exam room available.  The patient will be seen and further worked up in the main emergency department and their care will be completed by the main emergency department team.  I was not involved in this patient's care after the QDOC process, and unless otherwise noted in the ED provider note, was not involved in their care during their ED course. Receiving team will follow up on labs, analgesia, any clinical imaging, reassess and disposition as clinically indicated, all decisions regarding the progression of care will be made at their discretion.

## 2023-02-16 NOTE — ED PROVIDER NOTE - NSFOLLOWUPINSTRUCTIONS_ED_ALL_ED_FT
1. Please follow up with your Primary Care Doctor after discharge, bring a copy of your results to follow up appointment for review. Recommend repeat blood work at follow up appointment     2. Please rest, stay hydrated and continue all at home medications as previously prescribed. For persistent pain recommend taking Percocet as prescribed     3. For nausea/vomiting take Zofran 4mg every 8 hours as needed     4. You will receive a call for any outstanding results or may call our ED Administration line at [phone] daily 11am-4pm to obtain results    5. Follow up with Urology after discharge for reevaluation and continued management. Please see office information below to call and schedule appointment:      [facility]     [address] [place] [address].    [place] [address]     Phone Number: [phone]     6. We have reached out to our care coordinators to help schedule appointment and you should expect a call in the next 24-48 hours to expedite appointment     7. Return to ED for any new or worsened symptoms of concern

## 2023-02-16 NOTE — ED PROVIDER NOTE - ATTENDING APP SHARED VISIT CONTRIBUTION OF CARE
pt is a 78 y/o male with h/o leukemia not on chemo, dm, htn, foot surgery p/w l flank pain, n/v. hematuria feels better after taking naproxen, no f/c, feels better now, renal colic work up ordered, outpt recent labs wbc 25k now 50 likely due to acute stress from kidney stone, ct, ua, cret slighly worse then usual ckd 3, ivf ordered.

## 2023-02-16 NOTE — ED PROVIDER NOTE - PHYSICAL EXAMINATION
CONSTITUTIONAL: Patient is awake, alert and oriented x 3. Patient is well appearing and in no acute distress  HEAD: NCAT  NECK: supple, FROM  LUNGS: CTA b/l, no wheezing or rales   HEART: RRR.+S1S2 no murmurs  ABDOMEN: Soft, non-distended, nttp,  no rebound or guarding. No CVAT b/l  EXTREMITY: no edema or calf tenderness b/l, FROM upper and lower ext b/l  SKIN: with no rash or lesions  NEURO: No focal deficits

## 2023-02-16 NOTE — ED PROVIDER NOTE - OBJECTIVE STATEMENT
77 year old male with pmhx T2DM, HTN, HLD, peripheral neuropathy, CLL not on treatment presents to ED 2 days of left-sided flank pain with associated vomiting and hematuria.  Patient reports that he has had a sharp aching nonradiating pain in his left side back.  Last night at approximately 4 episodes of vomiting as well patient states that his urine has had bright red blood in it.  He took Aleve earlier today which temporarily helps his pain but pain returned which is why patient came to the ED.  He denies fevers, chills, chest pain, shortness of breath, diarrhea, dysuria

## 2023-02-16 NOTE — ED PROVIDER NOTE - NS ED ATTENDING STATEMENT MOD
This was a shared visit with the MAYELIN. I reviewed and verified the documentation and independently performed the documented:

## 2023-02-16 NOTE — ED ADULT NURSE NOTE - OBJECTIVE STATEMENT
Pt is 77Y M, pmhx leukemia (no chemo), BPH, HTN, DM2, c/o hematuria starting today. Pt states he noted bright red blood in urine this afternoon, pt denies any pain on urination or any penile discharge. Pt denies any blood thinner use, pt states he has not had any fever or chills, pt endorses not drinking much water lately. Pt is A&Ox3, ambulatory, denies any SOB, chest pain, dizziness, or any other issues. Pt updated on plan of care

## 2023-02-16 NOTE — ED PROVIDER NOTE - PATIENT PORTAL LINK FT
You can access the FollowMyHealth Patient Portal offered by [facility] by registering at the following website: [email or web address]. By joining [facility]’s FollowMyHealth portal, you will also be able to view your health information using other applications (apps) compatible with our system.

## 2023-02-16 NOTE — ED PROVIDER NOTE - PROGRESS NOTE DETAILS
Patient reassessed, currently asymptomatic. UA w/ + RBC and +WBC no leuk or nitrite. Prescription for abx sent to pharmacy. Will give admin number for pt to call, if cx neg can stop abx. Pt aware of elevated WBC and Cr and advised to have close PCP follow up for repeat labs. CT w/  Mild left hydroureteronephrosis with a questioned punctate calculus in  the distal left ureter which explains pt pain and hematuria.  Discussed "Indeterminate lucent osseous lesions along the superior left acetabulum   and within the T12 vertebral body, with slight increase in size of the acetabular lesion since 2017" pt aware to discuss w/ PCP. Will d/c home.     Noted Trop 61, unclear why trop was sent given pt w/out chest pain. In setting of elevated Cr and no concern for cardiac injury would not repeat     Elinor Gallardo PA-C

## 2023-02-18 LAB
CULTURE RESULTS: SIGNIFICANT CHANGE UP
SPECIMEN SOURCE: SIGNIFICANT CHANGE UP

## 2023-07-05 NOTE — DISCHARGE NOTE NURSING/CASE MANAGEMENT/SOCIAL WORK - NSDPDISTO_GEN_ALL_CORE
What Type Of Note Output Would You Prefer (Optional)?: Bullet Format What Is The Reason For Today's Visit?: Full Body Skin Examination What Is The Reason For Today's Visit? (Being Monitored For X): concerning skin lesions on an annual basis Home

## 2023-07-24 NOTE — DISCHARGE NOTE PROVIDER - NSDCCAREPROVSEEN_GEN_ALL_CORE_FT
Swapnil Cooley Cimzia Counseling:  I discussed with the patient the risks of Cimzia including but not limited to immunosuppression, allergic reactions and infections.  The patient understands that monitoring is required including a PPD at baseline and must alert us or the primary physician if symptoms of infection or other concerning signs are noted.

## 2023-09-05 NOTE — ED ADULT NURSE NOTE - NSFALLRSKASSESSTYPE_ED_ALL_ED
Initial (On Arrival) Rinvoq Counseling: I discussed with the patient the risks of Rinvoq therapy including but not limited to upper respiratory tract infections, shingles, cold sores, bronchitis, nausea, cough, fever, acne, and headache. Live vaccines should be avoided.  This medication has been linked to serious infections; higher rate of mortality; malignancy and lymphoproliferative disorders; major adverse cardiovascular events; thrombosis; thrombocytopenia, anemia, and neutropenia; lipid elevations; liver enzyme elevations; and gastrointestinal perforations.

## 2023-10-26 NOTE — ED ADULT NURSE NOTE - SUICIDE SCREENING QUESTION 2
"CC: right shoulder pain    18 y.o. Male presents today for evaluation of his right shoulder pain. He is accompanied today by his mother who was present for the duration of the visit today. He is a freshman  attending [employer] who admits to right anterior shoulder pain for the past "couple weeks" without known mechanism of injury. He states he has not been pitching due to pain. He has been taking OTC NSAIDs and working with his ATC on rehab exercises.   How long: "couple weeks"  What makes it better: Rest  What makes it worse: Internal rotation and arm pronation especially with after releasing the ball during his throwing motion  Does it radiate: No   Attempted treatments: Ibuprofen as needed, working with his   Pain score: 0/10   Any mechanical symptoms: No   Feelings of instability: No   Affecting ADLs: No     Occupation: student athlete - [employer]       PAST MEDICAL HISTORY:   History reviewed. No pertinent past medical history.    PAST SURGICAL HISTORY:   Past Surgical History:   Procedure Laterality Date    ADENOIDECTOMY Bilateral     TYMPANOSTOMY TUBE PLACEMENT Bilateral        FAMILY HISTORY:   Family History   Problem Relation Age of Onset    No Known Problems Mother     No Known Problems Father        SOCIAL HISTORY:   Social History     Socioeconomic History    Marital status: Single   Tobacco Use    Smoking status: Never    Smokeless tobacco: Never   Substance and Sexual Activity    Alcohol use: No     Alcohol/week: 0.0 standard drinks of alcohol    Drug use: No       MEDICATIONS:   No current outpatient medications on file.    ALLERGIES:   Review of patient's allergies indicates:  No Known Allergies     PHYSICAL EXAMINATION:  /69   Ht 6' 4" (1.93 m)   Wt 80.7 kg (178 lb)   BMI 21.67 kg/m²   Vitals signs and nursing note have been reviewed.  General: In no acute distress, well developed, well nourished, no diaphoresis  Eyes: EOM full and " smooth, no eye redness or discharge  HENT: normocephalic and atraumatic, neck supple, trachea midline, no nasal discharge, no external ear redness or discharge  Cardiovascular: 2+ and symmetric radial bilaterally, no LE edema  Lungs: respirations non-labored, no conversational dyspnea   Abd: non-distended, no rigidity  MSK: no amputation or deformity, no swelling of extremities  Neuro: AAOx3, CN2-12 grossly intact  Skin: No rashes, warm and dry  Psychiatric: cooperative, pleasant, mood and affect appropriate for age    SHOULDER: RIGHT  The affected shoulder is compared to the contralateral shoulder.    Observation:    CERVICAL SPINE  Normal head carriage.  Mild thoracic kyphosis.  Full AROM in flexion, extension, sidebending, and rotation.    SHOULDER  No ecchymosis, edema, or erythema throughout the shoulder girdle.  No sternal, clavicular, or acromial deformities bilaterally.  No atrophy of the pectorals, deltoids, supraspinatus, infraspinatus, or biceps bilaterally.  No asymmetry of shoulders bilaterally.    ROM:  Active flexion to 180° on left and 180° on right.   Active abduction to 180° on left and 180° on right.    Active internal rotation to T7 on left and T7 on right.    Active external rotation to T4 on left and T4 on right.    + Scapular dyskinesis    Tenderness:  No tenderness at the SC or AC joint  No tenderness over the clavicle   + tenderness over biceps tendon in the bicipital groove  No tenderness over subacromial space  + tenderness over the anterior glenohumeral joint    Strength Testing:  Deltoid - 5/5 on left and 5/5 on right  Biceps - 5/5 on left and 5/5 on right  Triceps - 5/5 on left and 5/5 on right  Wrist extension - 5/5 on left and 5/5 on right  Wrist flexion - 5/5 on left and 5/5 on right   - 5/5 on left and 5/5 on right  Finger extension - 5/5 on left and 5/5 on right  Finger abduction - 5/5 on left and 5/5 on right    Special Tests:  Empty can test - positive pain, negative  "weakness  Full can test - positive pain   Bear hug test - negative  Belly press test - negative  Resisted internal rotation - negative  Resisted external rotation - negative    Neer's test - negative  Hawkin's-Aneudy test - positive with internal rotation in flexion and abduction    OJeremys test - positive    Biceps load 1 test - negative  Biceps load 2 test - negative  Smith sheer test - negative    Speed's test - positive  Yergason's test - positive    Sulcus sign - none  AP load and shift laxity - increased compared to left  Anterior apprehension test - slightly positive    Neurovascular Exam:  2+ radial pulses BL  Sensation intact to light touch in the distal median, radial, and ulnar nerve distributions bilaterally.  Spurlings test - negative  Lhermittes test - negative  Capillary refill intact <2 seconds in all digits bilaterally      IMAGIN. X-ray ordered due to right shoulder pain   2. X-ray images were reviewed personally by me and then directly with patient.  3. FINDINGS: X-ray images obtained demonstrate no fracture or dislocation, joint spaces maintained.  4. IMPRESSION: As above.       ASSESSMENT:      ICD-10-CM ICD-9-CM   1. Acute pain of right shoulder  M25.511 719.41   2. Impingement of right shoulder  M25.811 719.81   3. Biceps tendinitis, right  M75.21 726.12         PLAN:  1-3.  Acute right shoulder pain/impingement/biceps tendinitis -     - Ammon is a [facility]  who admits to right anterior shoulder pain for the past "couple weeks" without known mechanism of injury. He is currently being held from throwing.    - XRs ordered in the office today and images were personally reviewed with the patient. See above for further detail.    - Symptoms, exam, and imaging are most consistent with impingement of the right shoulder secondary to altered throwing mechanics, scapular dyskinesis and likely over compensation.  We discussed the importance of decreasing " inflammation and strengthening and stabilizing to help promote and maintain symptom improvement/resolution.  This is commonly accomplished with a short course of an anti-inflammatory and icing in addition to osteopathic manipulation, a home exercise program or physical therapy.    - Physical therapy referral placed today to work on body mechanics and scapular stabilization.     - MRI of the right shoulder has been ordered.  We will modify physical therapy and throwing allowance based on findings and we will discuss next steps in management as well.    - Contact has been made with their  who is on board with the plan.       Future planning includes - next steps pending MRI, possible diagnostic ultrasound, possibly OMT if indicated, possible referral to Dr. Waldron    All questions were answered to the best of my ability and all concerns were addressed at this time.    Follow up after MRI for results and next steps.       This note is dictated using the M*Modal Fluency Direct word recognition program. There are word recognition mistakes that are occasionally missed on review.             No

## 2023-11-10 ENCOUNTER — INPATIENT (INPATIENT)
Facility: HOSPITAL | Age: 78
LOS: 1 days | Discharge: HOME | DRG: 603 | End: 2023-11-12
Attending: INTERNAL MEDICINE | Admitting: INTERNAL MEDICINE
Payer: MEDICARE

## 2023-11-10 VITALS
HEART RATE: 73 BPM | TEMPERATURE: 98 F | RESPIRATION RATE: 18 BRPM | WEIGHT: 203.05 LBS | DIASTOLIC BLOOD PRESSURE: 68 MMHG | HEIGHT: 72 IN | SYSTOLIC BLOOD PRESSURE: 132 MMHG | OXYGEN SATURATION: 98 %

## 2023-11-10 DIAGNOSIS — Z29.9 ENCOUNTER FOR PROPHYLACTIC MEASURES, UNSPECIFIED: ICD-10-CM

## 2023-11-10 DIAGNOSIS — Z90.89 ACQUIRED ABSENCE OF OTHER ORGANS: Chronic | ICD-10-CM

## 2023-11-10 DIAGNOSIS — C91.10 CHRONIC LYMPHOCYTIC LEUKEMIA OF B-CELL TYPE NOT HAVING ACHIEVED REMISSION: ICD-10-CM

## 2023-11-10 DIAGNOSIS — L03.90 CELLULITIS, UNSPECIFIED: ICD-10-CM

## 2023-11-10 DIAGNOSIS — E11.9 TYPE 2 DIABETES MELLITUS WITHOUT COMPLICATIONS: ICD-10-CM

## 2023-11-10 DIAGNOSIS — G62.9 POLYNEUROPATHY, UNSPECIFIED: ICD-10-CM

## 2023-11-10 DIAGNOSIS — N18.9 CHRONIC KIDNEY DISEASE, UNSPECIFIED: ICD-10-CM

## 2023-11-10 DIAGNOSIS — E78.5 HYPERLIPIDEMIA, UNSPECIFIED: ICD-10-CM

## 2023-11-10 DIAGNOSIS — R10.13 EPIGASTRIC PAIN: ICD-10-CM

## 2023-11-10 DIAGNOSIS — I10 ESSENTIAL (PRIMARY) HYPERTENSION: ICD-10-CM

## 2023-11-10 LAB
ALBUMIN SERPL ELPH-MCNC: 3.6 G/DL — SIGNIFICANT CHANGE UP (ref 3.3–5)
ALBUMIN SERPL ELPH-MCNC: 3.6 G/DL — SIGNIFICANT CHANGE UP (ref 3.3–5)
ALP SERPL-CCNC: 69 U/L — SIGNIFICANT CHANGE UP (ref 40–120)
ALP SERPL-CCNC: 69 U/L — SIGNIFICANT CHANGE UP (ref 40–120)
ALT FLD-CCNC: 20 U/L — SIGNIFICANT CHANGE UP (ref 12–78)
ALT FLD-CCNC: 20 U/L — SIGNIFICANT CHANGE UP (ref 12–78)
ANION GAP SERPL CALC-SCNC: 7 MMOL/L — SIGNIFICANT CHANGE UP (ref 5–17)
ANION GAP SERPL CALC-SCNC: 7 MMOL/L — SIGNIFICANT CHANGE UP (ref 5–17)
AST SERPL-CCNC: 15 U/L — SIGNIFICANT CHANGE UP (ref 15–37)
AST SERPL-CCNC: 15 U/L — SIGNIFICANT CHANGE UP (ref 15–37)
BASOPHILS # BLD AUTO: 0.28 K/UL — HIGH (ref 0–0.2)
BASOPHILS # BLD AUTO: 0.28 K/UL — HIGH (ref 0–0.2)
BASOPHILS NFR BLD AUTO: 1 % — SIGNIFICANT CHANGE UP (ref 0–2)
BASOPHILS NFR BLD AUTO: 1 % — SIGNIFICANT CHANGE UP (ref 0–2)
BILIRUB SERPL-MCNC: 0.6 MG/DL — SIGNIFICANT CHANGE UP (ref 0.2–1.2)
BILIRUB SERPL-MCNC: 0.6 MG/DL — SIGNIFICANT CHANGE UP (ref 0.2–1.2)
BUN SERPL-MCNC: 31 MG/DL — HIGH (ref 7–23)
BUN SERPL-MCNC: 31 MG/DL — HIGH (ref 7–23)
CALCIUM SERPL-MCNC: 8.8 MG/DL — SIGNIFICANT CHANGE UP (ref 8.5–10.1)
CALCIUM SERPL-MCNC: 8.8 MG/DL — SIGNIFICANT CHANGE UP (ref 8.5–10.1)
CHLORIDE SERPL-SCNC: 106 MMOL/L — SIGNIFICANT CHANGE UP (ref 96–108)
CHLORIDE SERPL-SCNC: 106 MMOL/L — SIGNIFICANT CHANGE UP (ref 96–108)
CO2 SERPL-SCNC: 27 MMOL/L — SIGNIFICANT CHANGE UP (ref 22–31)
CO2 SERPL-SCNC: 27 MMOL/L — SIGNIFICANT CHANGE UP (ref 22–31)
CREAT SERPL-MCNC: 1.5 MG/DL — HIGH (ref 0.5–1.3)
CREAT SERPL-MCNC: 1.5 MG/DL — HIGH (ref 0.5–1.3)
CRP SERPL-MCNC: <3 MG/L — SIGNIFICANT CHANGE UP
CRP SERPL-MCNC: <3 MG/L — SIGNIFICANT CHANGE UP
EGFR: 48 ML/MIN/1.73M2 — LOW
EGFR: 48 ML/MIN/1.73M2 — LOW
EOSINOPHIL # BLD AUTO: 0.56 K/UL — HIGH (ref 0–0.5)
EOSINOPHIL # BLD AUTO: 0.56 K/UL — HIGH (ref 0–0.5)
EOSINOPHIL NFR BLD AUTO: 2 % — SIGNIFICANT CHANGE UP (ref 0–6)
EOSINOPHIL NFR BLD AUTO: 2 % — SIGNIFICANT CHANGE UP (ref 0–6)
ERYTHROCYTE [SEDIMENTATION RATE] IN BLOOD: 7 MM/HR — SIGNIFICANT CHANGE UP (ref 0–20)
ERYTHROCYTE [SEDIMENTATION RATE] IN BLOOD: 7 MM/HR — SIGNIFICANT CHANGE UP (ref 0–20)
GLUCOSE SERPL-MCNC: 144 MG/DL — HIGH (ref 70–99)
GLUCOSE SERPL-MCNC: 144 MG/DL — HIGH (ref 70–99)
HCT VFR BLD CALC: 42.3 % — SIGNIFICANT CHANGE UP (ref 39–50)
HCT VFR BLD CALC: 42.3 % — SIGNIFICANT CHANGE UP (ref 39–50)
HGB BLD-MCNC: 14.1 G/DL — SIGNIFICANT CHANGE UP (ref 13–17)
HGB BLD-MCNC: 14.1 G/DL — SIGNIFICANT CHANGE UP (ref 13–17)
LACTATE SERPL-SCNC: 1 MMOL/L — SIGNIFICANT CHANGE UP (ref 0.7–2)
LACTATE SERPL-SCNC: 1 MMOL/L — SIGNIFICANT CHANGE UP (ref 0.7–2)
LYMPHOCYTES # BLD AUTO: 21.8 K/UL — HIGH (ref 1–3.3)
LYMPHOCYTES # BLD AUTO: 21.8 K/UL — HIGH (ref 1–3.3)
LYMPHOCYTES # BLD AUTO: 78 % — HIGH (ref 13–44)
LYMPHOCYTES # BLD AUTO: 78 % — HIGH (ref 13–44)
MANUAL SMEAR VERIFICATION: SIGNIFICANT CHANGE UP
MANUAL SMEAR VERIFICATION: SIGNIFICANT CHANGE UP
MCHC RBC-ENTMCNC: 29.9 PG — SIGNIFICANT CHANGE UP (ref 27–34)
MCHC RBC-ENTMCNC: 29.9 PG — SIGNIFICANT CHANGE UP (ref 27–34)
MCHC RBC-ENTMCNC: 33.3 GM/DL — SIGNIFICANT CHANGE UP (ref 32–36)
MCHC RBC-ENTMCNC: 33.3 GM/DL — SIGNIFICANT CHANGE UP (ref 32–36)
MCV RBC AUTO: 89.6 FL — SIGNIFICANT CHANGE UP (ref 80–100)
MCV RBC AUTO: 89.6 FL — SIGNIFICANT CHANGE UP (ref 80–100)
MONOCYTES # BLD AUTO: 0.56 K/UL — SIGNIFICANT CHANGE UP (ref 0–0.9)
MONOCYTES # BLD AUTO: 0.56 K/UL — SIGNIFICANT CHANGE UP (ref 0–0.9)
MONOCYTES NFR BLD AUTO: 2 % — SIGNIFICANT CHANGE UP (ref 2–14)
MONOCYTES NFR BLD AUTO: 2 % — SIGNIFICANT CHANGE UP (ref 2–14)
NEUTROPHILS # BLD AUTO: 2.52 K/UL — SIGNIFICANT CHANGE UP (ref 1.8–7.4)
NEUTROPHILS # BLD AUTO: 2.52 K/UL — SIGNIFICANT CHANGE UP (ref 1.8–7.4)
NEUTROPHILS NFR BLD AUTO: 9 % — LOW (ref 43–77)
NEUTROPHILS NFR BLD AUTO: 9 % — LOW (ref 43–77)
NRBC # BLD: 0 /100 — SIGNIFICANT CHANGE UP (ref 0–0)
NRBC # BLD: 0 /100 — SIGNIFICANT CHANGE UP (ref 0–0)
NRBC # BLD: SIGNIFICANT CHANGE UP /100 WBCS (ref 0–0)
NRBC # BLD: SIGNIFICANT CHANGE UP /100 WBCS (ref 0–0)
PLAT MORPH BLD: NORMAL — SIGNIFICANT CHANGE UP
PLAT MORPH BLD: NORMAL — SIGNIFICANT CHANGE UP
PLATELET # BLD AUTO: 155 K/UL — SIGNIFICANT CHANGE UP (ref 150–400)
PLATELET # BLD AUTO: 155 K/UL — SIGNIFICANT CHANGE UP (ref 150–400)
POTASSIUM SERPL-MCNC: 4.6 MMOL/L — SIGNIFICANT CHANGE UP (ref 3.5–5.3)
POTASSIUM SERPL-MCNC: 4.6 MMOL/L — SIGNIFICANT CHANGE UP (ref 3.5–5.3)
POTASSIUM SERPL-SCNC: 4.6 MMOL/L — SIGNIFICANT CHANGE UP (ref 3.5–5.3)
POTASSIUM SERPL-SCNC: 4.6 MMOL/L — SIGNIFICANT CHANGE UP (ref 3.5–5.3)
PROT SERPL-MCNC: 7.3 G/DL — SIGNIFICANT CHANGE UP (ref 6–8.3)
PROT SERPL-MCNC: 7.3 G/DL — SIGNIFICANT CHANGE UP (ref 6–8.3)
RBC # BLD: 4.72 M/UL — SIGNIFICANT CHANGE UP (ref 4.2–5.8)
RBC # BLD: 4.72 M/UL — SIGNIFICANT CHANGE UP (ref 4.2–5.8)
RBC # FLD: 15.8 % — HIGH (ref 10.3–14.5)
RBC # FLD: 15.8 % — HIGH (ref 10.3–14.5)
RBC BLD AUTO: NORMAL — SIGNIFICANT CHANGE UP
RBC BLD AUTO: NORMAL — SIGNIFICANT CHANGE UP
SMUDGE CELLS # BLD: PRESENT — SIGNIFICANT CHANGE UP
SMUDGE CELLS # BLD: PRESENT — SIGNIFICANT CHANGE UP
SODIUM SERPL-SCNC: 140 MMOL/L — SIGNIFICANT CHANGE UP (ref 135–145)
SODIUM SERPL-SCNC: 140 MMOL/L — SIGNIFICANT CHANGE UP (ref 135–145)
VARIANT LYMPHS # BLD: 8 % — HIGH (ref 0–6)
VARIANT LYMPHS # BLD: 8 % — HIGH (ref 0–6)
WBC # BLD: 27.95 K/UL — HIGH (ref 3.8–10.5)
WBC # BLD: 27.95 K/UL — HIGH (ref 3.8–10.5)
WBC # FLD AUTO: 27.95 K/UL — HIGH (ref 3.8–10.5)
WBC # FLD AUTO: 27.95 K/UL — HIGH (ref 3.8–10.5)

## 2023-11-10 PROCEDURE — 73630 X-RAY EXAM OF FOOT: CPT | Mod: 26,LT

## 2023-11-10 PROCEDURE — 99232 SBSQ HOSP IP/OBS MODERATE 35: CPT

## 2023-11-10 PROCEDURE — 71045 X-RAY EXAM CHEST 1 VIEW: CPT | Mod: 26

## 2023-11-10 PROCEDURE — 99285 EMERGENCY DEPT VISIT HI MDM: CPT

## 2023-11-10 PROCEDURE — 93010 ELECTROCARDIOGRAM REPORT: CPT

## 2023-11-10 PROCEDURE — 99222 1ST HOSP IP/OBS MODERATE 55: CPT | Mod: GC,AI

## 2023-11-10 PROCEDURE — 73718 MRI LOWER EXTREMITY W/O DYE: CPT | Mod: 26,LT

## 2023-11-10 PROCEDURE — 99222 1ST HOSP IP/OBS MODERATE 55: CPT

## 2023-11-10 RX ORDER — CARVEDILOL PHOSPHATE 80 MG/1
1 CAPSULE, EXTENDED RELEASE ORAL
Refills: 0 | DISCHARGE

## 2023-11-10 RX ORDER — ROSUVASTATIN CALCIUM 5 MG/1
1 TABLET ORAL
Refills: 0 | DISCHARGE

## 2023-11-10 RX ORDER — ATORVASTATIN CALCIUM 80 MG/1
40 TABLET, FILM COATED ORAL AT BEDTIME
Refills: 0 | Status: DISCONTINUED | OUTPATIENT
Start: 2023-11-10 | End: 2023-11-12

## 2023-11-10 RX ORDER — CHOLECALCIFEROL (VITAMIN D3) 125 MCG
1 CAPSULE ORAL
Qty: 0 | Refills: 0 | DISCHARGE

## 2023-11-10 RX ORDER — INSULIN LISPRO 100/ML
VIAL (ML) SUBCUTANEOUS AT BEDTIME
Refills: 0 | Status: DISCONTINUED | OUTPATIENT
Start: 2023-11-10 | End: 2023-11-12

## 2023-11-10 RX ORDER — PANTOPRAZOLE SODIUM 20 MG/1
40 TABLET, DELAYED RELEASE ORAL DAILY
Refills: 0 | Status: DISCONTINUED | OUTPATIENT
Start: 2023-11-10 | End: 2023-11-12

## 2023-11-10 RX ORDER — LACTOBACILLUS ACIDOPHILUS 100MM CELL
1 CAPSULE ORAL
Refills: 0 | Status: DISCONTINUED | OUTPATIENT
Start: 2023-11-10 | End: 2023-11-12

## 2023-11-10 RX ORDER — ACETAMINOPHEN 500 MG
650 TABLET ORAL EVERY 6 HOURS
Refills: 0 | Status: DISCONTINUED | OUTPATIENT
Start: 2023-11-10 | End: 2023-11-12

## 2023-11-10 RX ORDER — RAMIPRIL 5 MG
1 CAPSULE ORAL
Refills: 0 | DISCHARGE

## 2023-11-10 RX ORDER — SEMAGLUTIDE 0.68 MG/ML
0.5 INJECTION, SOLUTION SUBCUTANEOUS
Qty: 0 | Refills: 0 | DISCHARGE

## 2023-11-10 RX ORDER — DEXTROSE 50 % IN WATER 50 %
25 SYRINGE (ML) INTRAVENOUS ONCE
Refills: 0 | Status: DISCONTINUED | OUTPATIENT
Start: 2023-11-10 | End: 2023-11-12

## 2023-11-10 RX ORDER — DEXTROSE 50 % IN WATER 50 %
12.5 SYRINGE (ML) INTRAVENOUS ONCE
Refills: 0 | Status: DISCONTINUED | OUTPATIENT
Start: 2023-11-10 | End: 2023-11-12

## 2023-11-10 RX ORDER — GLUCAGON INJECTION, SOLUTION 0.5 MG/.1ML
1 INJECTION, SOLUTION SUBCUTANEOUS ONCE
Refills: 0 | Status: DISCONTINUED | OUTPATIENT
Start: 2023-11-10 | End: 2023-11-12

## 2023-11-10 RX ORDER — CEFAZOLIN SODIUM 1 G
1000 VIAL (EA) INJECTION EVERY 8 HOURS
Refills: 0 | Status: DISCONTINUED | OUTPATIENT
Start: 2023-11-10 | End: 2023-11-12

## 2023-11-10 RX ORDER — ENOXAPARIN SODIUM 100 MG/ML
40 INJECTION SUBCUTANEOUS EVERY 24 HOURS
Refills: 0 | Status: DISCONTINUED | OUTPATIENT
Start: 2023-11-10 | End: 2023-11-12

## 2023-11-10 RX ORDER — ASPIRIN/CALCIUM CARB/MAGNESIUM 324 MG
81 TABLET ORAL DAILY
Refills: 0 | Status: DISCONTINUED | OUTPATIENT
Start: 2023-11-10 | End: 2023-11-12

## 2023-11-10 RX ORDER — SIMVASTATIN 20 MG/1
1 TABLET, FILM COATED ORAL
Qty: 0 | Refills: 0 | DISCHARGE

## 2023-11-10 RX ORDER — SODIUM CHLORIDE 9 MG/ML
1000 INJECTION, SOLUTION INTRAVENOUS
Refills: 0 | Status: DISCONTINUED | OUTPATIENT
Start: 2023-11-10 | End: 2023-11-12

## 2023-11-10 RX ORDER — DEXTROSE 50 % IN WATER 50 %
15 SYRINGE (ML) INTRAVENOUS ONCE
Refills: 0 | Status: DISCONTINUED | OUTPATIENT
Start: 2023-11-10 | End: 2023-11-12

## 2023-11-10 RX ORDER — ASPIRIN/CALCIUM CARB/MAGNESIUM 324 MG
1 TABLET ORAL
Qty: 0 | Refills: 0 | DISCHARGE

## 2023-11-10 RX ORDER — LANOLIN ALCOHOL/MO/W.PET/CERES
3 CREAM (GRAM) TOPICAL AT BEDTIME
Refills: 0 | Status: DISCONTINUED | OUTPATIENT
Start: 2023-11-10 | End: 2023-11-12

## 2023-11-10 RX ORDER — LISINOPRIL 2.5 MG/1
10 TABLET ORAL DAILY
Refills: 0 | Status: DISCONTINUED | OUTPATIENT
Start: 2023-11-10 | End: 2023-11-12

## 2023-11-10 RX ORDER — METFORMIN HYDROCHLORIDE 850 MG/1
1 TABLET ORAL
Qty: 0 | Refills: 0 | DISCHARGE

## 2023-11-10 RX ORDER — INSULIN LISPRO 100/ML
VIAL (ML) SUBCUTANEOUS
Refills: 0 | Status: DISCONTINUED | OUTPATIENT
Start: 2023-11-10 | End: 2023-11-12

## 2023-11-10 RX ORDER — CEFAZOLIN SODIUM 1 G
1000 VIAL (EA) INJECTION ONCE
Refills: 0 | Status: COMPLETED | OUTPATIENT
Start: 2023-11-10 | End: 2023-11-10

## 2023-11-10 RX ORDER — CARVEDILOL PHOSPHATE 80 MG/1
25 CAPSULE, EXTENDED RELEASE ORAL EVERY 12 HOURS
Refills: 0 | Status: DISCONTINUED | OUTPATIENT
Start: 2023-11-10 | End: 2023-11-12

## 2023-11-10 RX ORDER — METFORMIN HYDROCHLORIDE 850 MG/1
1 TABLET ORAL
Refills: 0 | DISCHARGE

## 2023-11-10 RX ORDER — RAMIPRIL 5 MG
1 CAPSULE ORAL
Qty: 0 | Refills: 0 | DISCHARGE

## 2023-11-10 RX ORDER — ONDANSETRON 8 MG/1
4 TABLET, FILM COATED ORAL EVERY 8 HOURS
Refills: 0 | Status: DISCONTINUED | OUTPATIENT
Start: 2023-11-10 | End: 2023-11-12

## 2023-11-10 RX ADMIN — Medication 100 MILLIGRAM(S): at 11:39

## 2023-11-10 RX ADMIN — ATORVASTATIN CALCIUM 40 MILLIGRAM(S): 80 TABLET, FILM COATED ORAL at 23:02

## 2023-11-10 RX ADMIN — Medication 1 TABLET(S): at 18:01

## 2023-11-10 RX ADMIN — Medication 100 MILLIGRAM(S): at 20:50

## 2023-11-10 RX ADMIN — CARVEDILOL PHOSPHATE 25 MILLIGRAM(S): 80 CAPSULE, EXTENDED RELEASE ORAL at 18:01

## 2023-11-10 NOTE — ED PROVIDER NOTE - PHYSICAL EXAMINATION
Gen: Well appearing in NAD  Head: NC/AT  Neck: trachea midline  Resp:  No distress  Ext: left foot with 1-3 digit stumps 2-3 with redness and warmth and discoloration   Neuro:  A&O appears non focal  Skin:  Warm and dry as visualized  Psych:  Normal affect and mood

## 2023-11-10 NOTE — ED PROVIDER NOTE - OBJECTIVE STATEMENT
76yo M with DM2 and Htn ho left foot metatarsal amputation with dr green 2 years ago, pw 2 days of rendess and swelling to 2nd and 3rd digisti of left toe. no pain 2/2 neuropathy, no fevers ro chills 78yo M with DM2. CLL and Htn ho left foot metatarsal amputation with dr green 2 years ago, pw 2 days of rendess and swelling to 2nd and 3rd digisti of left toe. no pain 2/2 neuropathy, no fevers ro chills
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
## 2023-11-10 NOTE — CONSULT NOTE ADULT - SUBJECTIVE AND OBJECTIVE BOX
[facility]  INFECTIOUS DISEASES - [address], [place] [address]  Tel: [phone]     Fax: [phone]  =======================================================    [id number]  NAVIN MALDONADO     CC: Patient is a 77y old  Male who presents with a chief complaint of cellulitis (10 Nov 2023 14:53)    HPI:  77 year old male with pmhx of Type 2 DM, Neuropathy, CKD, CLL, HTN, HLD, s/p left foot metatarsal amputation with Dr. Lugo in 2021, who presents with complaints of redness and swelling of 2nd and 3rd digits of left toe.  He noticed this a few days ago. He denies any wound or drainage. He denies pain which he attributes to his neuropathy. Denies any recent trauma. Denies any fevers or chills. Patient complained of worsening GERD in ED.       PAST MEDICAL & SURGICAL HISTORY:  Diabetes      High cholesterol      Hypertension      History of adenoidectomy          Social Hx:     FAMILY HISTORY:  FHx: coronary artery disease (Father, Grandparent)        Allergies    No Known Allergies    Intolerances        Antibiotics:  MEDICATIONS  (STANDING):  aluminum hydroxide/magnesium hydroxide/simethicone Suspension 30 milliLiter(s) Oral every 6 hours  aspirin enteric coated 81 milliGRAM(s) Oral daily  atorvastatin 40 milliGRAM(s) Oral at bedtime  carvedilol 25 milliGRAM(s) Oral every 12 hours  ceFAZolin   IVPB 1000 milliGRAM(s) IV Intermittent every 8 hours  dextrose 5%. 1000 milliLiter(s) (50 mL/Hr) IV Continuous <Continuous>  dextrose 5%. 1000 milliLiter(s) (100 mL/Hr) IV Continuous <Continuous>  dextrose 50% Injectable 12.5 Gram(s) IV Push once  dextrose 50% Injectable 25 Gram(s) IV Push once  dextrose 50% Injectable 25 Gram(s) IV Push once  enoxaparin Injectable 40 milliGRAM(s) SubCutaneous every 24 hours  glucagon  Injectable 1 milliGRAM(s) IntraMuscular once  insulin lispro (ADMELOG) corrective regimen sliding scale   SubCutaneous three times a day before meals  insulin lispro (ADMELOG) corrective regimen sliding scale   SubCutaneous at bedtime  lactobacillus acidophilus 1 Tablet(s) Oral two times a day with meals  lisinopril 10 milliGRAM(s) Oral daily  pantoprazole    Tablet 40 milliGRAM(s) Oral daily    MEDICATIONS  (PRN):  acetaminophen     Tablet .. 650 milliGRAM(s) Oral every 6 hours PRN Temp greater or equal to 38C (100.4F), Mild Pain (1 - 3)  dextrose Oral Gel 15 Gram(s) Oral once PRN Blood Glucose LESS THAN 70 milliGRAM(s)/deciliter  melatonin 3 milliGRAM(s) Oral at bedtime PRN Insomnia  ondansetron Injectable 4 milliGRAM(s) IV Push every 8 hours PRN Nausea and/or Vomiting       REVIEW OF SYSTEMS:  CONSTITUTIONAL:  No Fever or chills  HEENT:  No sore throat or runny nose.  CARDIOVASCULAR:  No chest pain or SOB.  RESPIRATORY:  No cough, shortness of breath  GASTROINTESTINAL:  No abdominal pain, (+) belching  GENITOURINARY:  No dysuria, frequency or urgency  MUSCULOSKELETAL:  no joint aches, no muscle pain  SKIN:  see history  NEUROLOGIC:  No headache or dizziness  PSYCHIATRIC:  No disorder of thought or mood.    Physical Exam:  Vital Signs Last 24 Hrs  T(C): 36.9 (10 Nov 2023 14:04), Max: 37.2 (10 Nov 2023 14:04)  T(F): 98.4 (10 Nov 2023 14:04), Max: 98.9 (10 Nov 2023 14:04)  HR: 82 (10 Nov 2023 14:04) (73 - 85)  BP: 125/72 (10 Nov 2023 14:04) (125/72 - 132/68)  BP(mean): --  RR: 17 (10 Nov 2023 14:04) (15 - 18)  SpO2: 98% (10 Nov 2023 14:04) (98% - 99%)    Parameters below as of 10 Nov 2023 14:04  Patient On (Oxygen Delivery Method): room air      Height (cm): 182.9 (11-10 @ 09:22)  Weight (kg): 92.1 (11-10 @ 09:22)  BMI (kg/m2): 27.5 (11-10 @ 09:22)  BSA (m2): 2.14 (11-10 @ 09:22)  GEN: NAD  HEENT: normocephalic and atraumatic.   NECK: Supple.   LUNGS: Clear to auscultation.  HEART: Regular rate and rhythm   ABDOMEN: Soft, nontender, and nondistended.    EXTREMITIES: No leg edema.  NEUROLOGIC: grossly intact.  PSYCHIATRIC: Appropriate affect .  SKIN: (+) erythema and swelling of L foot 2nd toe, no open wounds noted    Labs:  11-10    140  |  106  |  31<H>  ----------------------------<  144<H>  4.6   |  27  |  1.50<H>    Ca    8.8      10 Nov 2023 11:30    TPro  7.3  /  Alb  3.6  /  TBili  0.6  /  DBili  x   /  AST  15  /  ALT  20  /  AlkPhos  69  11-10                          14.1   27.95 )-----------( 155      ( 10 Nov 2023 11:30 )             42.3       Urinalysis Basic - ( 10 Nov 2023 11:30 )    Color: x / Appearance: x / SG: x / pH: x  Gluc: 144 mg/dL / Ketone: x  / Bili: x / Urobili: x   Blood: x / Protein: x / Nitrite: x   Leuk Esterase: x / RBC: x / WBC x   Sq Epi: x / Non Sq Epi: x / Bacteria: x      LIVER FUNCTIONS - ( 10 Nov 2023 11:30 )  Alb: 3.6 g/dL / Pro: 7.3 g/dL / ALK PHOS: 69 U/L / ALT: 20 U/L / AST: 15 U/L / GGT: x                     Sedimentation Rate, Erythrocyte: 7 mm/hr (11-10-23 @ 11:30)        RECENT CULTURES:        All imaging and other data have been reviewed.      < from: Xray Chest 1 View- PORTABLE-Urgent (11.10.23 @ 11:07) >  AP chest on November 10, 2023 at 11:15 AM. 2 images.    Heart magnified by technique.    Lungs are clear.    Chest is similar to February 16 this year.    Left foot. 3 views. Small posterior and inferior calcaneal spurs are   noted.    Healed amputation of the mid shaft of the proximal phalanx of the great   toe again noted.    There are more recent amputations of the proximal shaft of the mid   phalanx of the second and third toes.    No signs of acute bony process.    IMPRESSION: Left foot indications as above. No acute left foot finding.   Chest unremarkable.    < end of copied text >

## 2023-11-10 NOTE — ED ADULT TRIAGE NOTE - CHIEF COMPLAINT QUOTE
Left foot third digit redness and swelling that was noticed 'a couple of days ago' by patient. Patient has no feeling in toes so unsure if he injured himself. Already has hx of partial amputation to first three digits by MD Lugo. Hx diabetes type 2.

## 2023-11-10 NOTE — H&P ADULT - PROBLEM SELECTOR PLAN 3
On Metformin at home   - Continue low dose ISS Patient complains of worsening dyspepsia in ED  - Standing maalox, protonix and probiotics ordered  - F/u H/pylori stool and breath test ordered

## 2023-11-10 NOTE — H&P ADULT - NSHPPHYSICALEXAM_GEN_ALL_CORE
T(C): 36.9 (11-10-23 @ 14:04), Max: 37.2 (11-10-23 @ 14:04)  HR: 82 (11-10-23 @ 14:04) (73 - 85)  BP: 125/72 (11-10-23 @ 14:04) (125/72 - 132/68)  RR: 17 (11-10-23 @ 14:04) (15 - 18)  SpO2: 98% (11-10-23 @ 14:04) (98% - 99%)    PHYSICAL EXAM:  GENERAL: NAD, lying in bed comfortably  HEAD:  Atraumatic, Normocephalic  EYES: EOMI, PERRLA, conjunctiva and sclera clear  ENT: Moist mucous membranes  NECK: Supple, No JVD  CHEST/LUNG: Clear to auscultation bilaterally; No rales, rhonchi, wheezing, or rubs. Unlabored respirations  HEART: Regular rate and rhythm; No murmurs, rubs, or gallops  ABDOMEN: Bowel sounds present; Soft, Nontender, Nondistended. No hepatomegally  EXTREMITIES:  2+ Peripheral Pulses, brisk capillary refill. No clubbing, cyanosis, or edema  NERVOUS SYSTEM: +numbness/decreased sensation b/l feet   SKIN: + left foot first metarsal amputation, +second and third digits of left foot skin redness swelling and crusting

## 2023-11-10 NOTE — H&P ADULT - PROBLEM SELECTOR PLAN 1
Patient presents with redness/swelling of left foot second and third digits   - S/P left foot first metatarsal amputation in 2021   - Given IV Ancef in ED, continue for now  - F/u blood cultures   - Afebrile, wbc elevated d/t CLL monitor fever curve and routine labs   - MRI of left foot no contrast ordered per podiatry recs  - Podiatry Dr. Hicks consulted appreciate recs  - ID Dr. Garcia consulted appreciate recs

## 2023-11-10 NOTE — PATIENT PROFILE ADULT - FALL HARM RISK - UNIVERSAL INTERVENTIONS
Bed in lowest position, wheels locked, appropriate side rails in place/Call bell, personal items and telephone in reach/Instruct patient to call for assistance before getting out of bed or chair/Non-slip footwear when patient is out of bed/[place] to call system/Physically safe environment - no spills, clutter or unnecessary equipment/Purposeful Proactive Rounding/Room/bathroom lighting operational, light cord in reach
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
## 2023-11-10 NOTE — CONSULT NOTE ADULT - SUBJECTIVE AND OBJECTIVE BOX
NOTE IN PROGRESS    Pt for admission  Left foot MRI w/o contrast   HPI:  77y year old Male seen at [facility] for left 2nd and 3rd toe redness and swelling. Of note is that patient is s/p left hallux, 2nd digit, and 3rd digit partial amputations with Dr. Lugo. Patient relates that he noticed the redness and swelling earlier this week. Patient denies any trauma or illiciting events that he recalls. Denies any fever, chills, nausea, vomiting, chest pain, shortness of breath, or calf pain at this time.      PAST MEDICAL & SURGICAL HISTORY:  Diabetes      High cholesterol      Hypertension      History of adenoidectomy          Allergies    No Known Allergies    Intolerances        MEDICATIONS  (STANDING):  aluminum hydroxide/magnesium hydroxide/simethicone Suspension 30 milliLiter(s) Oral every 6 hours  aspirin enteric coated 81 milliGRAM(s) Oral daily  atorvastatin 40 milliGRAM(s) Oral at bedtime  carvedilol 25 milliGRAM(s) Oral every 12 hours  ceFAZolin   IVPB 1000 milliGRAM(s) IV Intermittent every 8 hours  dextrose 5%. 1000 milliLiter(s) (100 mL/Hr) IV Continuous <Continuous>  dextrose 5%. 1000 milliLiter(s) (50 mL/Hr) IV Continuous <Continuous>  dextrose 50% Injectable 25 Gram(s) IV Push once  dextrose 50% Injectable 25 Gram(s) IV Push once  dextrose 50% Injectable 12.5 Gram(s) IV Push once  enoxaparin Injectable 40 milliGRAM(s) SubCutaneous every 24 hours  glucagon  Injectable 1 milliGRAM(s) IntraMuscular once  insulin lispro (ADMELOG) corrective regimen sliding scale   SubCutaneous three times a day before meals  insulin lispro (ADMELOG) corrective regimen sliding scale   SubCutaneous at bedtime  lactobacillus acidophilus 1 Tablet(s) Oral two times a day with meals  lisinopril 10 milliGRAM(s) Oral daily  pantoprazole    Tablet 40 milliGRAM(s) Oral daily    MEDICATIONS  (PRN):  acetaminophen     Tablet .. 650 milliGRAM(s) Oral every 6 hours PRN Temp greater or equal to 38C (100.4F), Mild Pain (1 - 3)  dextrose Oral Gel 15 Gram(s) Oral once PRN Blood Glucose LESS THAN 70 milliGRAM(s)/deciliter  melatonin 3 milliGRAM(s) Oral at bedtime PRN Insomnia  ondansetron Injectable 4 milliGRAM(s) IV Push every 8 hours PRN Nausea and/or Vomiting      Social History:  Denies current alcohol use, tobacco use, and recreational drug use  States hx of smoking for around 11 years, quit over 25 years ago   Fully independent with ADLs (10 Nov 2023 14:53)      FAMILY HISTORY:  FHx: coronary artery disease (Father, Grandparent)        Vital Signs Last 24 Hrs  T(C): 36.7 (10 Nov 2023 20:39), Max: 37.2 (10 Nov 2023 14:04)  T(F): 98.1 (10 Nov 2023 20:39), Max: 98.9 (10 Nov 2023 14:04)  HR: 72 (10 Nov 2023 20:39) (71 - 85)  BP: 126/73 (10 Nov 2023 20:39) (125/72 - 132/78)  BP(mean): --  RR: 18 (10 Nov 2023 20:39) (15 - 18)  SpO2: 96% (10 Nov 2023 20:39) (95% - 99%)    Parameters below as of 10 Nov 2023 20:39  Patient On (Oxygen Delivery Method): room air        PHYSICAL EXAM:  Vascular: DP & PT palpable bilaterally, Capillary refill 3 seconds, Digital hair present bilaterally  Neurological: Light touch sensation not intact bilaterally  Musculoskeletal: 5/5 strength in all quadrants bilaterally, AJ & STJ ROM intact, s/p left hallux, 2nd digit, and 3rd digit partial amputations, healed.  Dermatological: Left 2nd digit lateral aspect edema and hyperpigmentation noted, no open lesions, no proximal streaking, no fluctuance                          14.1   27.95 )-----------( 155      ( 10 Nov 2023 11:30 )             42.3       11-10    140  |  106  |  31<H>  ----------------------------<  144<H>  4.6   |  27  |  1.50<H>    Ca    8.8      10 Nov 2023 11:30    TPro  7.3  /  Alb  3.6  /  TBili  0.6  /  DBili  x   /  AST  15  /  ALT  20  /  AlkPhos  69  11-10              Imaging: Left foot radiographs do not show any fractures, dislocations, periostitis, cortical erosions, or soft tissue emphysema. left foot mri shows marrow edema at the left 4th distal phalanx and the distal tip of the left 3rd digit amputation stump

## 2023-11-10 NOTE — ED ADULT NURSE NOTE - OBJECTIVE STATEMENT
Detail Level: Detailed General Sunscreen Counseling: I recommended a broad spectrum sunscreen with a SPF of 30 or higher.  I explained that SPF 30 sunscreens block approximately 97 percent of the sun's harmful rays.  Sunscreens should be applied at least 15 minutes prior to expected sun exposure and then every 2 hours after that as long as sun exposure continues. If swimming or exercising sunscreen should be reapplied every 45 minutes to an hour after getting wet or sweating.  One ounce, or the equivalent of a shot glass full of sunscreen, is adequate to protect the skin not covered by a bathing suit. I also recommended a lip balm with a sunscreen as well. Sun protective clothing can be used in lieu of sunscreen but must be worn the entire time you are exposed to the sun's rays. Pt states his 2nd and 3rd toe on L foot started getting red yesterday. States he had 1st 2nd 3rd toe amputated d/t diabetes 2 years ago, no problems until now. PCP sent him to ER, c/o 5/10 pain and swelling.

## 2023-11-10 NOTE — CONSULT NOTE ADULT - PROBLEM SELECTOR RECOMMENDATION 9
Patient examined and evaluated at this time. Antibiotics as per infectious disease recommendations. Marrow edema on MRI likely early osteitis as clinical symptoms do not correlate with osteomyelitis. Monitor while patient is in house. No surgical interventions planned from podiatry's standpoint at this time.

## 2023-11-10 NOTE — H&P ADULT - HISTORY OF PRESENT ILLNESS
77 year old male with pmhx of Type 2 DM, Neuropathy, CKD, CLL, HTN, HLD, s/p left foot metatarsal amputation with Dr. Lugo in 2021 presents with complaints of redness and swelling of 2nd and 3rd digits of left toe.  He noticed this earlier this week. He denies any drainage of the wound. He denies pain which he attributes to his neuropathy. He states he went to wound care today and was told to come to ED. He denies any fever, HA, cp, sob, abd pain, N/V/D/C. He follows with Heme Onc for his CLL he is not on any medication and is closely monitored with blood work which he had last yesterday. Of note, patient states he is unsure if he takes Gabapentin for neuropathy. The medication is not present in his list of home meds.     ED course:   Vitals: T 98.9 HR 82 /72 RR 17 SpO2 98% RA  Labs: Wbc 27.95, BUN/Cr 1/5   Given: Ancef x1 in ED    77 year old male with pmhx of Type 2 DM, Neuropathy, CKD, CLL, HTN, HLD, s/p left foot metatarsal amputation with Dr. Lugo in 2021 presents with complaints of redness and swelling of 2nd and 3rd digits of left toe.  He noticed this earlier this week. He denies any drainage of the wound. He denies pain which he attributes to his neuropathy. He states he went to wound care today and was told to come to ED. He denies any fever, HA, cp, sob, abd pain, N/V/D/C. He follows with Heme Onc for his CLL he is not on any medication and is closely monitored with blood work which he had last yesterday. Of note, patient states he is unsure if he takes Gabapentin for neuropathy. The medication is not present in his list of home meds.   Patient complained of worsening GERD in ED.     ED course:   Vitals: T 98.9 HR 82 /72 RR 17 SpO2 98% RA  Labs: Wbc 27.95, BUN/Cr 1/5   Given: Ancef x1 in ED

## 2023-11-10 NOTE — H&P ADULT - NSHPREVIEWOFSYSTEMS_GEN_ALL_CORE
REVIEW OF SYSTEMS:    CONSTITUTIONAL:  No weakness, fevers or chills  EYES/ENT:  No visual changes;  No vertigo or throat pain   NECK:  No pain or stiffness  RESPIRATORY:  No cough, wheezing, hemoptysis; No shortness of breath  CARDIOVASCULAR:  No chest pain or palpitations  GASTROINTESTINAL:  No abdominal or epigastric pain. No nausea, vomiting, or hematemesis; No diarrhea or constipation. No melena or hematochezia.  GENITOURINARY:  No dysuria, frequency or hematuria  NEUROLOGICAL:  +numbness in b/l feet   SKIN: left second and third metarsal redness and swelling

## 2023-11-10 NOTE — H&P ADULT - ASSESSMENT
77 year old male with pmhx of Type 2 DM, Neuropathy, CLL, HTN, HLD, CKD s/p left foot metatarsal amputation with Dr. Lugo in 2021 presents with complaints of redness and swelling of 2nd and 3rd digits of left toe.

## 2023-11-10 NOTE — H&P ADULT - PROBLEM SELECTOR PLAN 6
Patient follows closely with Heme Onc outpatient, currently off any medication/treatment regime Continue Statin

## 2023-11-10 NOTE — ED PROVIDER NOTE - CLINICAL SUMMARY MEDICAL DECISION MAKING FREE TEXT BOX
78yo M with DM2 and Htn ho left foot metatarsal amputation with dr green 2 years ago, pw 2 days of rendess and swelling to 2nd and 3rd digisti of left toe. no pain 2/2 neuropathy, no fevers ro chills  will treat for cellulitis,  labs, xr,  podiatry eval

## 2023-11-10 NOTE — ED ADULT NURSE NOTE - NSFALLUNIVINTERV_ED_ALL_ED
Bed/Stretcher in lowest position, wheels locked, appropriate side rails in place/Call bell, personal items and telephone in reach/Instruct patient to call for assistance before getting out of bed/chair/stretcher/Non-slip footwear applied when patient is off stretcher/[place] to call system/Physically safe environment - no spills, clutter or unnecessary equipment/Purposeful proactive rounding/Room/bathroom lighting operational, light cord in reach

## 2023-11-10 NOTE — H&P ADULT - PROBLEM SELECTOR PLAN 5
BUN/Cr 31/1.5, appears to be at baseline Patient reports pmhx of peripheral neuropathy, states he cannot feel sensation/pain in his feet b/l   - Patient does not appear to be on Gabapentin, however  - Recommend outpatient follow up to consider starting Gabapentin of peripheral neuropathy

## 2023-11-10 NOTE — H&P ADULT - NSHPSOCIALHISTORY_GEN_ALL_CORE
Denies current alcohol use, tobacco use, and recreational drug use  States hx of smoking for around 11 years, quit over 25 years ago   Fully independent with ADLs

## 2023-11-10 NOTE — H&P ADULT - ATTENDING COMMENTS
77 year old male with pmhx of Type 2 DM, Neuropathy, CLL, HTN, HLD, CKD s/p left foot metatarsal amputation with Dr. Lugo in 2021 presents with complaints of redness and swelling of 2nd and 3rd digits of left toe    Care d/w ID, cont pt on Ancef, f/u cultures, and assess for resolution of cellulitis.  MRI of foot as per Pod recs.  Pt c/o dyspepsia, started on protonix and maalox, and will test h. pylori again, as pt says he had it last 1.5 yrs ago, but now having symptoms again despite 2 rounds of treatment.    Balwinder Hooper, Attending Physician

## 2023-11-11 LAB
A1C WITH ESTIMATED AVERAGE GLUCOSE RESULT: 7 % — HIGH (ref 4–5.6)
A1C WITH ESTIMATED AVERAGE GLUCOSE RESULT: 7 % — HIGH (ref 4–5.6)
ALBUMIN SERPL ELPH-MCNC: 3.2 G/DL — LOW (ref 3.3–5)
ALBUMIN SERPL ELPH-MCNC: 3.2 G/DL — LOW (ref 3.3–5)
ALP SERPL-CCNC: 58 U/L — SIGNIFICANT CHANGE UP (ref 40–120)
ALP SERPL-CCNC: 58 U/L — SIGNIFICANT CHANGE UP (ref 40–120)
ALT FLD-CCNC: 17 U/L — SIGNIFICANT CHANGE UP (ref 12–78)
ALT FLD-CCNC: 17 U/L — SIGNIFICANT CHANGE UP (ref 12–78)
ANION GAP SERPL CALC-SCNC: 6 MMOL/L — SIGNIFICANT CHANGE UP (ref 5–17)
ANION GAP SERPL CALC-SCNC: 6 MMOL/L — SIGNIFICANT CHANGE UP (ref 5–17)
AST SERPL-CCNC: 14 U/L — LOW (ref 15–37)
AST SERPL-CCNC: 14 U/L — LOW (ref 15–37)
BILIRUB SERPL-MCNC: 0.4 MG/DL — SIGNIFICANT CHANGE UP (ref 0.2–1.2)
BILIRUB SERPL-MCNC: 0.4 MG/DL — SIGNIFICANT CHANGE UP (ref 0.2–1.2)
BUN SERPL-MCNC: 31 MG/DL — HIGH (ref 7–23)
BUN SERPL-MCNC: 31 MG/DL — HIGH (ref 7–23)
CALCIUM SERPL-MCNC: 8.5 MG/DL — SIGNIFICANT CHANGE UP (ref 8.5–10.1)
CALCIUM SERPL-MCNC: 8.5 MG/DL — SIGNIFICANT CHANGE UP (ref 8.5–10.1)
CHLORIDE SERPL-SCNC: 108 MMOL/L — SIGNIFICANT CHANGE UP (ref 96–108)
CHLORIDE SERPL-SCNC: 108 MMOL/L — SIGNIFICANT CHANGE UP (ref 96–108)
CO2 SERPL-SCNC: 27 MMOL/L — SIGNIFICANT CHANGE UP (ref 22–31)
CO2 SERPL-SCNC: 27 MMOL/L — SIGNIFICANT CHANGE UP (ref 22–31)
CREAT SERPL-MCNC: 1.5 MG/DL — HIGH (ref 0.5–1.3)
CREAT SERPL-MCNC: 1.5 MG/DL — HIGH (ref 0.5–1.3)
CRP SERPL-MCNC: <3 MG/L — SIGNIFICANT CHANGE UP
CRP SERPL-MCNC: <3 MG/L — SIGNIFICANT CHANGE UP
EGFR: 48 ML/MIN/1.73M2 — LOW
EGFR: 48 ML/MIN/1.73M2 — LOW
ESTIMATED AVERAGE GLUCOSE: 154 MG/DL — HIGH (ref 68–114)
ESTIMATED AVERAGE GLUCOSE: 154 MG/DL — HIGH (ref 68–114)
GLUCOSE SERPL-MCNC: 130 MG/DL — HIGH (ref 70–99)
GLUCOSE SERPL-MCNC: 130 MG/DL — HIGH (ref 70–99)
HCT VFR BLD CALC: 39.3 % — SIGNIFICANT CHANGE UP (ref 39–50)
HCT VFR BLD CALC: 39.3 % — SIGNIFICANT CHANGE UP (ref 39–50)
HGB BLD-MCNC: 13 G/DL — SIGNIFICANT CHANGE UP (ref 13–17)
HGB BLD-MCNC: 13 G/DL — SIGNIFICANT CHANGE UP (ref 13–17)
MCHC RBC-ENTMCNC: 29.2 PG — SIGNIFICANT CHANGE UP (ref 27–34)
MCHC RBC-ENTMCNC: 29.2 PG — SIGNIFICANT CHANGE UP (ref 27–34)
MCHC RBC-ENTMCNC: 33.1 GM/DL — SIGNIFICANT CHANGE UP (ref 32–36)
MCHC RBC-ENTMCNC: 33.1 GM/DL — SIGNIFICANT CHANGE UP (ref 32–36)
MCV RBC AUTO: 88.3 FL — SIGNIFICANT CHANGE UP (ref 80–100)
MCV RBC AUTO: 88.3 FL — SIGNIFICANT CHANGE UP (ref 80–100)
NRBC # BLD: 0 /100 WBCS — SIGNIFICANT CHANGE UP (ref 0–0)
NRBC # BLD: 0 /100 WBCS — SIGNIFICANT CHANGE UP (ref 0–0)
PLATELET # BLD AUTO: 142 K/UL — LOW (ref 150–400)
PLATELET # BLD AUTO: 142 K/UL — LOW (ref 150–400)
POTASSIUM SERPL-MCNC: 4.4 MMOL/L — SIGNIFICANT CHANGE UP (ref 3.5–5.3)
POTASSIUM SERPL-MCNC: 4.4 MMOL/L — SIGNIFICANT CHANGE UP (ref 3.5–5.3)
POTASSIUM SERPL-SCNC: 4.4 MMOL/L — SIGNIFICANT CHANGE UP (ref 3.5–5.3)
POTASSIUM SERPL-SCNC: 4.4 MMOL/L — SIGNIFICANT CHANGE UP (ref 3.5–5.3)
PROT SERPL-MCNC: 6.7 G/DL — SIGNIFICANT CHANGE UP (ref 6–8.3)
PROT SERPL-MCNC: 6.7 G/DL — SIGNIFICANT CHANGE UP (ref 6–8.3)
RBC # BLD: 4.45 M/UL — SIGNIFICANT CHANGE UP (ref 4.2–5.8)
RBC # BLD: 4.45 M/UL — SIGNIFICANT CHANGE UP (ref 4.2–5.8)
RBC # FLD: 15.6 % — HIGH (ref 10.3–14.5)
RBC # FLD: 15.6 % — HIGH (ref 10.3–14.5)
SODIUM SERPL-SCNC: 141 MMOL/L — SIGNIFICANT CHANGE UP (ref 135–145)
SODIUM SERPL-SCNC: 141 MMOL/L — SIGNIFICANT CHANGE UP (ref 135–145)
WBC # BLD: 32.46 K/UL — HIGH (ref 3.8–10.5)
WBC # BLD: 32.46 K/UL — HIGH (ref 3.8–10.5)
WBC # FLD AUTO: 32.46 K/UL — HIGH (ref 3.8–10.5)
WBC # FLD AUTO: 32.46 K/UL — HIGH (ref 3.8–10.5)

## 2023-11-11 PROCEDURE — 99232 SBSQ HOSP IP/OBS MODERATE 35: CPT

## 2023-11-11 RX ADMIN — Medication 81 MILLIGRAM(S): at 12:27

## 2023-11-11 RX ADMIN — PANTOPRAZOLE SODIUM 40 MILLIGRAM(S): 20 TABLET, DELAYED RELEASE ORAL at 12:27

## 2023-11-11 RX ADMIN — Medication 1 TABLET(S): at 17:43

## 2023-11-11 RX ADMIN — ENOXAPARIN SODIUM 40 MILLIGRAM(S): 100 INJECTION SUBCUTANEOUS at 07:01

## 2023-11-11 RX ADMIN — CARVEDILOL PHOSPHATE 25 MILLIGRAM(S): 80 CAPSULE, EXTENDED RELEASE ORAL at 07:01

## 2023-11-11 RX ADMIN — CARVEDILOL PHOSPHATE 25 MILLIGRAM(S): 80 CAPSULE, EXTENDED RELEASE ORAL at 17:43

## 2023-11-11 RX ADMIN — Medication 100 MILLIGRAM(S): at 04:53

## 2023-11-11 RX ADMIN — LISINOPRIL 10 MILLIGRAM(S): 2.5 TABLET ORAL at 07:01

## 2023-11-11 RX ADMIN — ATORVASTATIN CALCIUM 40 MILLIGRAM(S): 80 TABLET, FILM COATED ORAL at 21:39

## 2023-11-11 RX ADMIN — Medication 100 MILLIGRAM(S): at 21:39

## 2023-11-11 RX ADMIN — Medication 100 MILLIGRAM(S): at 12:29

## 2023-11-11 RX ADMIN — Medication 3 MILLIGRAM(S): at 21:39

## 2023-11-11 RX ADMIN — Medication 1 TABLET(S): at 08:02

## 2023-11-11 NOTE — PROGRESS NOTE ADULT - TIME BILLING
Reviewing chart notes and data, face to face time counseling the patient, coordinating care with SW/CM at [facility].

## 2023-11-11 NOTE — CONSULT NOTE ADULT - SUBJECTIVE AND OBJECTIVE BOX
Patient is a 77y old  Male who presents with a chief complaint of cellulitis (10 Nov 2023 15:57)    HPI:  77 year old male with pmhx of Type 2 DM, Neuropathy, CKD, CLL, HTN, HLD, s/p left foot metatarsal amputation with Dr. Lugo in 2021 presents with complaints of redness and swelling of 2nd and 3rd digits of left toe.  He noticed this earlier this week. He denies any drainage of the wound. He denies pain which he attributes to his neuropathy. He states he went to wound care today and was told to come to ED. He denies any fever, HA, cp, sob, abd pain, N/V/D/C. He follows with Heme Onc for his CLL he is not on any medication and is closely monitored with blood work which he had last yesterday. Of note, patient states he is unsure if he takes Gabapentin for neuropathy. The medication is not present in his list of home meds.   Patient complained of worsening GERD in ED.     ED course:   Vitals: T 98.9 HR 82 /72 RR 17 SpO2 98% RA  Labs: Wbc 27.95, BUN/Cr 1/5   Given: Ancef x1 in ED    (10 Nov 2023 14:53)    Renal consult called for chronic kidney disease stage 3. History obtained from chart and patient.       PAST MEDICAL HISTORY:  Diabetes    High cholesterol    Hypertension        PAST SURGICAL HISTORY:  History of adenoidectomy        FAMILY HISTORY:  FHx: coronary artery disease (Father, Grandparent)        SOCIAL HISTORY: No smoking or alcohol use     Allergies    No Known Allergies    Intolerances      Home Medications:  aspirin 81 mg oral tablet: 1 tab(s) orally once a day (10 Nov 2023 14:47)  carvedilol 20 mg oral capsule, extended release: 1 cap(s) orally 2 times a day (10 Nov 2023 14:45)  metFORMIN 750 mg oral tablet, extended release: 1 tab(s) orally once a day (10 Nov 2023 14:46)  ramipril 2.5 mg oral capsule: 1 cap(s) orally once a day (10 Nov 2023 14:46)  rosuvastatin 10 mg oral capsule: 1 cap(s) orally once a day (10 Nov 2023 14:46)  Vitamin D3 25 mcg (1000 intl units) oral tablet: 1 tab(s) orally once a day (10 Nov 2023 14:47)    MEDICATIONS  (STANDING):  aluminum hydroxide/magnesium hydroxide/simethicone Suspension 30 milliLiter(s) Oral every 6 hours  aspirin enteric coated 81 milliGRAM(s) Oral daily  atorvastatin 40 milliGRAM(s) Oral at bedtime  carvedilol 25 milliGRAM(s) Oral every 12 hours  ceFAZolin   IVPB 1000 milliGRAM(s) IV Intermittent every 8 hours  dextrose 5%. 1000 milliLiter(s) (50 mL/Hr) IV Continuous <Continuous>  dextrose 5%. 1000 milliLiter(s) (100 mL/Hr) IV Continuous <Continuous>  dextrose 50% Injectable 25 Gram(s) IV Push once  dextrose 50% Injectable 25 Gram(s) IV Push once  dextrose 50% Injectable 12.5 Gram(s) IV Push once  enoxaparin Injectable 40 milliGRAM(s) SubCutaneous every 24 hours  glucagon  Injectable 1 milliGRAM(s) IntraMuscular once  insulin lispro (ADMELOG) corrective regimen sliding scale   SubCutaneous at bedtime  insulin lispro (ADMELOG) corrective regimen sliding scale   SubCutaneous three times a day before meals  lactobacillus acidophilus 1 Tablet(s) Oral two times a day with meals  lisinopril 10 milliGRAM(s) Oral daily  pantoprazole    Tablet 40 milliGRAM(s) Oral daily    MEDICATIONS  (PRN):  acetaminophen     Tablet .. 650 milliGRAM(s) Oral every 6 hours PRN Temp greater or equal to 38C (100.4F), Mild Pain (1 - 3)  dextrose Oral Gel 15 Gram(s) Oral once PRN Blood Glucose LESS THAN 70 milliGRAM(s)/deciliter  melatonin 3 milliGRAM(s) Oral at bedtime PRN Insomnia  ondansetron Injectable 4 milliGRAM(s) IV Push every 8 hours PRN Nausea and/or Vomiting      REVIEW OF SYSTEMS:  General: no distress  Respiratory: No cough, SOB  Cardiovascular: No CP or Palpitations	  Gastrointestinal: No nausea, Vomiting. No diarrhea  Genitourinary: No urinary complaints	  Musculoskeletal: No new rash or lesions		  all other systems negative    T(F): 97.8 (11-11-23 @ 12:31), Max: 98.9 (11-10-23 @ 14:04)  HR: 70 (11-11-23 @ 12:31) (70 - 84)  BP: 127/78 (11-11-23 @ 12:31) (125/72 - 132/78)  RR: 18 (11-11-23 @ 12:31) (17 - 18)  SpO2: 98% (11-11-23 @ 12:31) (95% - 98%)  Wt(kg): --    PHYSICAL EXAM:  General: NAD  Respiratory: b/l air entry  Cardiovascular: S1 S2  Gastrointestinal: soft  Extremities: no edema        11-11    141  |  108  |  31<H>  ----------------------------<  130<H>  4.4   |  27  |  1.50<H>    Ca    8.5      11 Nov 2023 07:25    TPro  6.7  /  Alb  3.2<L>  /  TBili  0.4  /  DBili  x   /  AST  14<L>  /  ALT  17  /  AlkPhos  58  11-11                          13.0   32.46 )-----------( 142      ( 11 Nov 2023 07:25 )             39.3       Potassium: 4.4 mmol/L (11-11 @ 07:25)  Blood Urea Nitrogen: 31 mg/dL (11-11 @ 07:25)  Calcium: 8.5 mg/dL (11-11 @ 07:25)  Hemoglobin: 13.0 g/dL (11-11 @ 07:25)      Creatinine, Serum: 1.50 (11-11 @ 07:25)  Creatinine, Serum: 1.50 (11-10 @ 11:30)      Urinalysis Basic - ( 11 Nov 2023 07:25 )    Color: x / Appearance: x / SG: x / pH: x  Gluc: 130 mg/dL / Ketone: x  / Bili: x / Urobili: x   Blood: x / Protein: x / Nitrite: x   Leuk Esterase: x / RBC: x / WBC x   Sq Epi: x / Non Sq Epi: x / Bacteria: x      LIVER FUNCTIONS - ( 11 Nov 2023 07:25 )  Alb: 3.2 g/dL / Pro: 6.7 g/dL / ALK PHOS: 58 U/L / ALT: 17 U/L / AST: 14 U/L / GGT: x                   < from: Xray Chest 1 View- PORTABLE-Urgent (11.10.23 @ 11:07) >    ACC: [id number] EXAM:  XR FOOT COMP MIN 3 VIEWS LT   ORDERED BY: CHEKO WEBB     ACC: [id number] EXAM:  XR CHEST PORTABLE URGENT 1V   ORDERED BY: CHEKO WEBB     PROCEDURE DATE:  11/10/2023          INTERPRETATION:  Chest and left foot. Patienthas sepsis and concern is   infection left foot.    AP chest on November 10, 2023 at 11:15 AM. 2 images.    Heart magnified by technique.    Lungs are clear.    Chest is similar to February 16 this year.    Left foot. 3 views. Small posterior and inferior calcaneal spurs are   noted.    Healed amputation of the mid shaft of the proximal phalanx of the great   toe again noted.    There are more recent amputations of the proximal shaft of the mid   phalanx of the second and third toes.    No signs of acute bony process.    IMPRESSION: Left foot indications as above. No acute left foot finding.   Chest unremarkable.    --- End of Report ---            LIBRA PAZ MD; Attending Radiologist  This document has been electronically signed. Nov 10 2023  1:47PM    < end of copied text >

## 2023-11-11 NOTE — CONSULT NOTE ADULT - ASSESSMENT
CKD 3  Cellulitis, Left foot  Diabetes  Hypertension    Renal indices at baseline. To continue current meds. Podiatry follow up. On ACEI. Monitor BP trend. Titrate BP meds as needed.   Monitor blood sugar levels. Insulin coverage as needed. Dietary restriction. Will follow electrolytes and renal function trend.   D/w patient regarding need for out patient nephrology follow up.     Further recommendations pending clinical course. Thank you for the courtesy of this referral. 
77 year old male with pmhx of Type 2 DM, Neuropathy, CKD, CLL, HTN, HLD, s/p left foot metatarsal amputation with Dr. Lugo in 2021, who presents with complaints of redness and swelling of 2nd and 3rd digits of left toe. Concern for L toe cellulitis, no evidence of purulence on exam. Leukocytosis appears to be chronic comparing to prior labs in the setting of CLL. Denies any fevers or chills.    #Left 2nd toe cellulitis  #Leukocytosis    -continue cefazolin 1g IV q8h  -check CRP in AM  -keep leg elevated    Thank you for courtesy of this consult.     Will follow.  Discussed with Dr. Sandie Garcia MD  Division of Infectious Diseases   Cell [phone] between 8am and 6pm   After 6pm and weekends please call ID service at [phone].     55 minutes spent on total encounter assessing patient, examination, chart review, counseling and coordinating care by the attending physician/nurse/care manager.   
Left foot edema & hyperpigmentation 2/2 poss cellulitis

## 2023-11-12 ENCOUNTER — TRANSCRIPTION ENCOUNTER (OUTPATIENT)
Age: 78
End: 2023-11-12

## 2023-11-12 VITALS
HEART RATE: 64 BPM | OXYGEN SATURATION: 95 % | DIASTOLIC BLOOD PRESSURE: 67 MMHG | TEMPERATURE: 98 F | SYSTOLIC BLOOD PRESSURE: 109 MMHG | RESPIRATION RATE: 19 BRPM

## 2023-11-12 LAB
ANION GAP SERPL CALC-SCNC: 7 MMOL/L — SIGNIFICANT CHANGE UP (ref 5–17)
ANION GAP SERPL CALC-SCNC: 7 MMOL/L — SIGNIFICANT CHANGE UP (ref 5–17)
BUN SERPL-MCNC: 36 MG/DL — HIGH (ref 7–23)
BUN SERPL-MCNC: 36 MG/DL — HIGH (ref 7–23)
CALCIUM SERPL-MCNC: 8.4 MG/DL — LOW (ref 8.5–10.1)
CALCIUM SERPL-MCNC: 8.4 MG/DL — LOW (ref 8.5–10.1)
CHLORIDE SERPL-SCNC: 109 MMOL/L — HIGH (ref 96–108)
CHLORIDE SERPL-SCNC: 109 MMOL/L — HIGH (ref 96–108)
CO2 SERPL-SCNC: 24 MMOL/L — SIGNIFICANT CHANGE UP (ref 22–31)
CO2 SERPL-SCNC: 24 MMOL/L — SIGNIFICANT CHANGE UP (ref 22–31)
CREAT SERPL-MCNC: 1.6 MG/DL — HIGH (ref 0.5–1.3)
CREAT SERPL-MCNC: 1.6 MG/DL — HIGH (ref 0.5–1.3)
EGFR: 44 ML/MIN/1.73M2 — LOW
EGFR: 44 ML/MIN/1.73M2 — LOW
GLUCOSE SERPL-MCNC: 168 MG/DL — HIGH (ref 70–99)
GLUCOSE SERPL-MCNC: 168 MG/DL — HIGH (ref 70–99)
HCT VFR BLD CALC: 39.7 % — SIGNIFICANT CHANGE UP (ref 39–50)
HCT VFR BLD CALC: 39.7 % — SIGNIFICANT CHANGE UP (ref 39–50)
HGB BLD-MCNC: 13.3 G/DL — SIGNIFICANT CHANGE UP (ref 13–17)
HGB BLD-MCNC: 13.3 G/DL — SIGNIFICANT CHANGE UP (ref 13–17)
MCHC RBC-ENTMCNC: 29.6 PG — SIGNIFICANT CHANGE UP (ref 27–34)
MCHC RBC-ENTMCNC: 29.6 PG — SIGNIFICANT CHANGE UP (ref 27–34)
MCHC RBC-ENTMCNC: 33.5 GM/DL — SIGNIFICANT CHANGE UP (ref 32–36)
MCHC RBC-ENTMCNC: 33.5 GM/DL — SIGNIFICANT CHANGE UP (ref 32–36)
MCV RBC AUTO: 88.2 FL — SIGNIFICANT CHANGE UP (ref 80–100)
MCV RBC AUTO: 88.2 FL — SIGNIFICANT CHANGE UP (ref 80–100)
NRBC # BLD: 0 /100 WBCS — SIGNIFICANT CHANGE UP (ref 0–0)
NRBC # BLD: 0 /100 WBCS — SIGNIFICANT CHANGE UP (ref 0–0)
PLATELET # BLD AUTO: 151 K/UL — SIGNIFICANT CHANGE UP (ref 150–400)
PLATELET # BLD AUTO: 151 K/UL — SIGNIFICANT CHANGE UP (ref 150–400)
POTASSIUM SERPL-MCNC: 4.3 MMOL/L — SIGNIFICANT CHANGE UP (ref 3.5–5.3)
POTASSIUM SERPL-MCNC: 4.3 MMOL/L — SIGNIFICANT CHANGE UP (ref 3.5–5.3)
POTASSIUM SERPL-SCNC: 4.3 MMOL/L — SIGNIFICANT CHANGE UP (ref 3.5–5.3)
POTASSIUM SERPL-SCNC: 4.3 MMOL/L — SIGNIFICANT CHANGE UP (ref 3.5–5.3)
RBC # BLD: 4.5 M/UL — SIGNIFICANT CHANGE UP (ref 4.2–5.8)
RBC # BLD: 4.5 M/UL — SIGNIFICANT CHANGE UP (ref 4.2–5.8)
RBC # FLD: 15.9 % — HIGH (ref 10.3–14.5)
RBC # FLD: 15.9 % — HIGH (ref 10.3–14.5)
SODIUM SERPL-SCNC: 140 MMOL/L — SIGNIFICANT CHANGE UP (ref 135–145)
SODIUM SERPL-SCNC: 140 MMOL/L — SIGNIFICANT CHANGE UP (ref 135–145)
WBC # BLD: 29.37 K/UL — HIGH (ref 3.8–10.5)
WBC # BLD: 29.37 K/UL — HIGH (ref 3.8–10.5)
WBC # FLD AUTO: 29.37 K/UL — HIGH (ref 3.8–10.5)
WBC # FLD AUTO: 29.37 K/UL — HIGH (ref 3.8–10.5)

## 2023-11-12 PROCEDURE — 85025 COMPLETE CBC W/AUTO DIFF WBC: CPT

## 2023-11-12 PROCEDURE — 85652 RBC SED RATE AUTOMATED: CPT

## 2023-11-12 PROCEDURE — 96374 THER/PROPH/DIAG INJ IV PUSH: CPT

## 2023-11-12 PROCEDURE — 80053 COMPREHEN METABOLIC PANEL: CPT

## 2023-11-12 PROCEDURE — 71045 X-RAY EXAM CHEST 1 VIEW: CPT

## 2023-11-12 PROCEDURE — 36415 COLL VENOUS BLD VENIPUNCTURE: CPT

## 2023-11-12 PROCEDURE — 87040 BLOOD CULTURE FOR BACTERIA: CPT

## 2023-11-12 PROCEDURE — 83036 HEMOGLOBIN GLYCOSYLATED A1C: CPT

## 2023-11-12 PROCEDURE — 73718 MRI LOWER EXTREMITY W/O DYE: CPT

## 2023-11-12 PROCEDURE — 99239 HOSP IP/OBS DSCHRG MGMT >30: CPT

## 2023-11-12 PROCEDURE — 99285 EMERGENCY DEPT VISIT HI MDM: CPT | Mod: 25

## 2023-11-12 PROCEDURE — 86140 C-REACTIVE PROTEIN: CPT

## 2023-11-12 PROCEDURE — 85027 COMPLETE CBC AUTOMATED: CPT

## 2023-11-12 PROCEDURE — 73630 X-RAY EXAM OF FOOT: CPT

## 2023-11-12 PROCEDURE — 80048 BASIC METABOLIC PNL TOTAL CA: CPT

## 2023-11-12 PROCEDURE — 82962 GLUCOSE BLOOD TEST: CPT

## 2023-11-12 PROCEDURE — 93005 ELECTROCARDIOGRAM TRACING: CPT

## 2023-11-12 PROCEDURE — 83605 ASSAY OF LACTIC ACID: CPT

## 2023-11-12 RX ORDER — LACTOBACILLUS ACIDOPHILUS 100MM CELL
1 CAPSULE ORAL
Qty: 30 | Refills: 0
Start: 2023-11-12 | End: 2023-11-21

## 2023-11-12 RX ORDER — CEPHALEXIN 500 MG
1 CAPSULE ORAL
Qty: 21 | Refills: 0
Start: 2023-11-12 | End: 2023-11-16

## 2023-11-12 RX ORDER — PANTOPRAZOLE SODIUM 20 MG/1
1 TABLET, DELAYED RELEASE ORAL
Qty: 30 | Refills: 0
Start: 2023-11-12

## 2023-11-12 RX ORDER — CEPHALEXIN 500 MG
1 CAPSULE ORAL
Qty: 20 | Refills: 0
Start: 2023-11-12 | End: 2023-11-16

## 2023-11-12 RX ORDER — LACTOBACILLUS ACIDOPHILUS 100MM CELL
1 CAPSULE ORAL
Qty: 42 | Refills: 0
Start: 2023-11-12 | End: 2023-11-25

## 2023-11-12 RX ADMIN — ENOXAPARIN SODIUM 40 MILLIGRAM(S): 100 INJECTION SUBCUTANEOUS at 05:05

## 2023-11-12 RX ADMIN — Medication 100 MILLIGRAM(S): at 12:36

## 2023-11-12 RX ADMIN — Medication 81 MILLIGRAM(S): at 12:36

## 2023-11-12 RX ADMIN — Medication 100 MILLIGRAM(S): at 04:00

## 2023-11-12 RX ADMIN — PANTOPRAZOLE SODIUM 40 MILLIGRAM(S): 20 TABLET, DELAYED RELEASE ORAL at 12:35

## 2023-11-12 RX ADMIN — CARVEDILOL PHOSPHATE 25 MILLIGRAM(S): 80 CAPSULE, EXTENDED RELEASE ORAL at 05:05

## 2023-11-12 RX ADMIN — Medication 1 TABLET(S): at 08:17

## 2023-11-12 RX ADMIN — Medication 30 MILLILITER(S): at 05:05

## 2023-11-12 RX ADMIN — LISINOPRIL 10 MILLIGRAM(S): 2.5 TABLET ORAL at 05:05

## 2023-11-12 NOTE — PROGRESS NOTE ADULT - PROBLEM SELECTOR PLAN 8
Patient follows closely with Heme Onc outpatient, currently off any medication/treatment regime
Patient follows closely with Heme Onc outpatient, currently off any medication/treatment regime

## 2023-11-12 NOTE — PROGRESS NOTE ADULT - PROBLEM SELECTOR PLAN 3
Patient complains of worsening dyspepsia in ED  - Standing maalox, protonix and probiotics ordered  - F/u H/pylori stool and breath test ordered
Patient complains of worsening dyspepsia in ED  - Standing maalox, protonix and probiotics ordered  - F/u H/pylori stool and breath test ordered

## 2023-11-12 NOTE — CARE COORDINATION ASSESSMENT. - NSCAREPROVIDERS_GEN_ALL_CORE_FT
CARE PROVIDERS:  Accepting Physician: Balwinder Hooper  Admitting: Balwinder Hooper  Attending: Balwinder Hooper  Case Management: Behringer, Megan  Consultant: Crow Hampton  Consultant: Naya Garcia  Consultant: Carmella Castillo  Consultant: Abner Pino  ED Attending: Maryjo Esquivel  ED Nurse: Miriam Barney  Nurse: Howie Cantrell  Ordered: Doctor, Unknown  Ordered: ADM, User  Outpatient Provider: Carl Justin  Outpatient Provider: Ag Moser  PCA/Nursing Assistant: Kathryn Shaikh  PCA/Nursing Assistant: Cecy Mace  Primary Team: Errol Tinoco  Primary Team: Ailyn Admae  Registered Dietitian: Dana Eaton  Team: [facility], Team

## 2023-11-12 NOTE — DISCHARGE NOTE PROVIDER - NSDCCPCAREPLAN_GEN_ALL_CORE_FT
PRINCIPAL DISCHARGE DIAGNOSIS  Diagnosis: Cellulitis  Assessment and Plan of Treatment: You were admitted to the hospital for cellulitis  You were started on IV antibiotics and switched to oral Keflex 500mg every 6 hours for 5 more days (START TONIGHT)  Take a probiotic while on antibiotics  Follow up with your podiatrist within the week  Follow up with your primary care physician within the week     PRINCIPAL DISCHARGE DIAGNOSIS  Diagnosis: Cellulitis  Assessment and Plan of Treatment: You were admitted to the hospital for cellulitis  You were started on IV antibiotics and switched to oral Keflex 500mg every 6 hours for 5 more days (START TOMORROW)  Take a probiotic while on antibiotics  Follow up with your podiatrist within the week  Follow up with your primary care physician within the week     PRINCIPAL DISCHARGE DIAGNOSIS  Diagnosis: Cellulitis  Assessment and Plan of Treatment: You were admitted to the hospital for cellulitis  You were started on IV antibiotics and switched to oral Keflex 500mg every 6 hours for 5 more days (START TOMORROW)  Take a probiotic while on antibiotics  Follow up with your podiatrist within the week  Follow up with your primary care physician within the week. Repeat your blood work.

## 2023-11-12 NOTE — CARE COORDINATION ASSESSMENT. - NSPASTMEDSURGHISTORY_GEN_ALL_CORE_FT
PAST MEDICAL & SURGICAL HISTORY:  Hypertension      High cholesterol      Diabetes      History of adenoidectomy

## 2023-11-12 NOTE — PROGRESS NOTE ADULT - PROBLEM SELECTOR PLAN 5
Patient reports pmhx of peripheral neuropathy, states he cannot feel sensation/pain in his feet b/l   - Recommend outpatient follow up to consider starting Gabapentin of peripheral neuropathy
Offered and patient declined
Patient reports pmhx of peripheral neuropathy, states he cannot feel sensation/pain in his feet b/l   - Patient does not appear to be on Gabapentin, however  - Recommend outpatient follow up to consider starting Gabapentin of peripheral neuropathy

## 2023-11-12 NOTE — DISCHARGE NOTE NURSING/CASE MANAGEMENT/SOCIAL WORK - PATIENT PORTAL LINK FT
You can access the FollowMyHealth Patient Portal offered by [facility] by registering at the following website: [email or web address]. By joining [email or web address]’s FollowMyHealth portal, you will also be able to view your health information using other applications (apps) compatible with our system.

## 2023-11-12 NOTE — DISCHARGE NOTE PROVIDER - NSDCMRMEDTOKEN_GEN_ALL_CORE_FT
aspirin 81 mg oral tablet: 1 tab(s) orally once a day  carvedilol 20 mg oral capsule, extended release: 1 cap(s) orally 2 times a day  metFORMIN 750 mg oral tablet, extended release: 1 tab(s) orally once a day  ramipril 2.5 mg oral capsule: 1 cap(s) orally once a day  rosuvastatin 10 mg oral capsule: 1 cap(s) orally once a day  Vitamin D3 25 mcg (1000 intl units) oral tablet: 1 tab(s) orally once a day   Acidophilus oral capsule: 1 cap(s) orally 3 times a day (with meals) orally  aspirin 81 mg oral tablet: 1 tab(s) orally once a day  carvedilol 20 mg oral capsule, extended release: 1 cap(s) orally 2 times a day  cephalexin 500 mg oral capsule: 1 cap(s) orally every 6 hours  metFORMIN 750 mg oral tablet, extended release: 1 tab(s) orally once a day  pantoprazole 40 mg oral delayed release tablet: 1 tab(s) orally once a day  ramipril 2.5 mg oral capsule: 1 cap(s) orally once a day  rosuvastatin 10 mg oral capsule: 1 cap(s) orally once a day  Vitamin D3 25 mcg (1000 intl units) oral tablet: 1 tab(s) orally once a day

## 2023-11-12 NOTE — DISCHARGE NOTE PROVIDER - HOSPITAL COURSE
Hospital Course  77 year old male with pmhx of Type 2 DM, Neuropathy, CLL, HTN, HLD, CKD s/p left foot metatarsal amputation with Dr. Lugo in 2021 presents with complaints of redness and swelling of 2nd and 3rd digits of left toe. Admitted for cellulitis. s/P left foot first metatarsal amputation in 2021   Started on IV Ancef -switched to PO Kelfex 500mg q 6 hours x 5 days   MRI foot performed.   Podiatry and ID following recommendations appreciated  home medications resumed      Discharging Provider:  Errol Tinoco DO  Contact Info: Cell [phone] Please call with any questions or concerns.      Time spent: 35 minutes.

## 2023-11-12 NOTE — PROGRESS NOTE ADULT - PROBLEM SELECTOR PLAN 7
BUN/Cr 31/1.5, appears to be at baseline  avoid nephrotoxic medications  follow up AM BMP
BUN/Cr 31/1.5, appears to be at baseline

## 2023-11-12 NOTE — PROGRESS NOTE ADULT - PROBLEM SELECTOR PLAN 1
Patient examined and evaluated at this time. Antibiotics as per infectious disease recommendations. Patient advised to monitor for any changes. No surgical interventions planned from podiatry's standpoint at this time.
Patient presents with redness/swelling of left foot second and third digits   - S/P left foot first metatarsal amputation in 2021   - Given IV Ancef in ED, continue for now - switch to PO Kelfex 500mg q 6 hours x 5 days   - blood cultures  x 2 no grwoth  - Afebrile, wbc elevated d/t CLL monitor fever curve and routine labs   - MRI of left foot no contrast performed   - Podiatry Dr. Hicks consulted appreciate recs  - ID Dr. Garcia consulted appreciate recs
Patient presents with redness/swelling of left foot second and third digits   - S/P left foot first metatarsal amputation in 2021   - Given IV Ancef in ED, continue for now  - F/u blood cultures   - Afebrile, wbc elevated d/t CLL monitor fever curve and routine labs   - MRI of left foot no contrast ordered per podiatry recs  - Podiatry Dr. Hicks consulted appreciate recs  - ID Dr. Garcia consulted appreciate recs

## 2023-11-12 NOTE — PROGRESS NOTE ADULT - PROBLEM SELECTOR PLAN 4
On Metformin at home   A1C 7.0  - Continue low dose ISS  -hypoglycemia protocol, accu-checks  -blood glucose goal 100-180 in hospital setting
On Metformin at home   - Continue low dose ISS

## 2023-11-12 NOTE — DISCHARGE NOTE NURSING/CASE MANAGEMENT/SOCIAL WORK - NSDCPEFALRISK_GEN_ALL_CORE
For information on Fall & Injury Prevention, visit: [email or web address] OR  [email or web address] OR  https://www.cdc.gov/steadi/patient.html

## 2023-11-12 NOTE — PROGRESS NOTE ADULT - SUBJECTIVE AND OBJECTIVE BOX
[facility]  INFECTIOUS DISEASES - [address], [place] [address]  Tel: [phone]     Fax: [phone]  =======================================================    NAVIN MALDONADO [id number]    Follow up: No fevers. Denies any new complaints. Thinks the toes appear better today.    Allergies:  No Known Allergies      Antibiotics:  acetaminophen     Tablet .. 650 milliGRAM(s) Oral every 6 hours PRN  aluminum hydroxide/magnesium hydroxide/simethicone Suspension 30 milliLiter(s) Oral every 6 hours  aspirin enteric coated 81 milliGRAM(s) Oral daily  atorvastatin 40 milliGRAM(s) Oral at bedtime  carvedilol 25 milliGRAM(s) Oral every 12 hours  ceFAZolin   IVPB 1000 milliGRAM(s) IV Intermittent every 8 hours  dextrose 5%. 1000 milliLiter(s) IV Continuous <Continuous>  dextrose 5%. 1000 milliLiter(s) IV Continuous <Continuous>  dextrose 50% Injectable 25 Gram(s) IV Push once  dextrose 50% Injectable 25 Gram(s) IV Push once  dextrose 50% Injectable 12.5 Gram(s) IV Push once  dextrose Oral Gel 15 Gram(s) Oral once PRN  enoxaparin Injectable 40 milliGRAM(s) SubCutaneous every 24 hours  glucagon  Injectable 1 milliGRAM(s) IntraMuscular once  insulin lispro (ADMELOG) corrective regimen sliding scale   SubCutaneous three times a day before meals  insulin lispro (ADMELOG) corrective regimen sliding scale   SubCutaneous at bedtime  lactobacillus acidophilus 1 Tablet(s) Oral two times a day with meals  lisinopril 10 milliGRAM(s) Oral daily  melatonin 3 milliGRAM(s) Oral at bedtime PRN  ondansetron Injectable 4 milliGRAM(s) IV Push every 8 hours PRN  pantoprazole    Tablet 40 milliGRAM(s) Oral daily       REVIEW OF SYSTEMS:  CONSTITUTIONAL:  No Fever or chills  HEENT:  No sore throat or runny nose.  CARDIOVASCULAR:  No chest pain or SOB.  RESPIRATORY:  No cough, shortness of breath  GASTROINTESTINAL:  No abdominal pain,no diarrhea  GENITOURINARY:  No dysuria, frequency or urgency  MUSCULOSKELETAL:  no joint aches, no muscle pain  SKIN:  see history  NEUROLOGIC:  No headache or dizziness  PSYCHIATRIC:  No disorder of thought or mood.     Physical Exam:  ICU Vital Signs Last 24 Hrs  T(C): 36.6 (11 Nov 2023 12:31), Max: 36.7 (10 Nov 2023 20:39)  T(F): 97.8 (11 Nov 2023 12:31), Max: 98.1 (10 Nov 2023 20:39)  HR: 70 (11 Nov 2023 12:31) (70 - 74)  BP: 127/78 (11 Nov 2023 12:31) (126/73 - 131/73)  BP(mean): --  ABP: --  ABP(mean): --  RR: 18 (11 Nov 2023 12:31) (18 - 18)  SpO2: 98% (11 Nov 2023 12:31) (96% - 98%)    O2 Parameters below as of 11 Nov 2023 12:31  Patient On (Oxygen Delivery Method): room air      GEN: NAD  HEENT: normocephalic and atraumatic.   NECK: Supple.   LUNGS: Clear to auscultation.  HEART: Regular rate and rhythm   ABDOMEN: Soft, nontender, and nondistended.    EXTREMITIES: No leg edema.  NEUROLOGIC: grossly intact.  PSYCHIATRIC: Appropriate affect .  SKIN: (+) erythema and swelling of L foot 2nd toe appears improved, no open wounds noted  (+) scab on L 4th toe, no open wounds or drainage      Labs:  11-11    141  |  108  |  31<H>  ----------------------------<  130<H>  4.4   |  27  |  1.50<H>    Ca    8.5      11 Nov 2023 07:25    TPro  6.7  /  Alb  3.2<L>  /  TBili  0.4  /  DBili  x   /  AST  14<L>  /  ALT  17  /  AlkPhos  58  11-11                          13.0   32.46 )-----------( 142      ( 11 Nov 2023 07:25 )             39.3       Urinalysis Basic - ( 11 Nov 2023 07:25 )    Color: x / Appearance: x / SG: x / pH: x  Gluc: 130 mg/dL / Ketone: x  / Bili: x / Urobili: x   Blood: x / Protein: x / Nitrite: x   Leuk Esterase: x / RBC: x / WBC x   Sq Epi: x / Non Sq Epi: x / Bacteria: x      LIVER FUNCTIONS - ( 11 Nov 2023 07:25 )  Alb: 3.2 g/dL / Pro: 6.7 g/dL / ALK PHOS: 58 U/L / ALT: 17 U/L / AST: 14 U/L / GGT: x             RECENT CULTURES:  11-10 @ 11:30 .Blood Blood-Peripheral     No growth at 24 hours        11-10 @ 11:20 .Blood Blood-Peripheral     No growth at 24 hours              All imaging and data are reviewed.     < from: MR Foot No Cont, Left (11.10.23 @ 20:22) >  INTERPRETATION:  Postsurgical: The distal half of the first digit distal phalanx has been   previously amputated. There is also partial amputation noted of the   second and third digits at the level of the bases of the middle phalanges.    Bones: There is patchy T1 hypointensity within the distal aspect of the   second digit proximal phalanx at the PIP joint with overlying soft tissue   edema and skin thickening but without STIR hyperintensity, findings which   could be chronic.    Patchy STIR hyperintensity is seen within the stump of the third digit   middle phalanx with T1 hypointensity and surrounding soft tissue   edema/phlegmon raising concern for osteomyelitis. Findings are similarly   present within the very distal tip of the fourth digit distal phalanx   with an overlying wound.    There is moderate degenerative cystic change within the navicular and the   medial cuneiform. Cystic degenerative changes are also present within the   lateral cuneiform at the third tarsometatarsal joint and at the base of   the fourth metatarsal.    Soft Tissues: The retracted stump of the flexor hallucis tendon is   present at the level of the first metatarsal head. No evidence of abscess.    IMPRESSION:  1.  Findings equivocal for early osteomyelitis versus reactive osteitis   within the tip of the fourth digit distal phalanx and within the stump of   the third digit middle phalanx, status post partial amputation of the   third digit, as discussed above.    2.  No convincing evidence for osteomyelitis of the first or second digit   stumps.    3.  Midfoot arthrosis with large cystic change at the naviculocuneiform   joint and additional findings as above.      < end of copied text >  
SUBJECTIVE: 77y year old Male seen at V 1EAS 105 D1 for left 2nd and 3rd toe redness and swelling, improved today. Denies any fever, chills, nausea, vomiting, chest pain, shortness of breath, or calf pain at this time.    Allergies    No Known Allergies    Intolerances        MEDICATIONS  (STANDING):  aluminum hydroxide/magnesium hydroxide/simethicone Suspension 30 milliLiter(s) Oral every 6 hours  aspirin enteric coated 81 milliGRAM(s) Oral daily  atorvastatin 40 milliGRAM(s) Oral at bedtime  carvedilol 25 milliGRAM(s) Oral every 12 hours  ceFAZolin   IVPB 1000 milliGRAM(s) IV Intermittent every 8 hours  dextrose 5%. 1000 milliLiter(s) (100 mL/Hr) IV Continuous <Continuous>  dextrose 5%. 1000 milliLiter(s) (50 mL/Hr) IV Continuous <Continuous>  dextrose 50% Injectable 25 Gram(s) IV Push once  dextrose 50% Injectable 12.5 Gram(s) IV Push once  dextrose 50% Injectable 25 Gram(s) IV Push once  enoxaparin Injectable 40 milliGRAM(s) SubCutaneous every 24 hours  glucagon  Injectable 1 milliGRAM(s) IntraMuscular once  insulin lispro (ADMELOG) corrective regimen sliding scale   SubCutaneous three times a day before meals  insulin lispro (ADMELOG) corrective regimen sliding scale   SubCutaneous at bedtime  lactobacillus acidophilus 1 Tablet(s) Oral two times a day with meals  lisinopril 10 milliGRAM(s) Oral daily  pantoprazole    Tablet 40 milliGRAM(s) Oral daily    MEDICATIONS  (PRN):  acetaminophen     Tablet .. 650 milliGRAM(s) Oral every 6 hours PRN Temp greater or equal to 38C (100.4F), Mild Pain (1 - 3)  dextrose Oral Gel 15 Gram(s) Oral once PRN Blood Glucose LESS THAN 70 milliGRAM(s)/deciliter  melatonin 3 milliGRAM(s) Oral at bedtime PRN Insomnia  ondansetron Injectable 4 milliGRAM(s) IV Push every 8 hours PRN Nausea and/or Vomiting      Vital Signs Last 24 Hrs  T(C): 36.6 (11 Nov 2023 12:31), Max: 36.7 (10 Nov 2023 20:39)  T(F): 97.8 (11 Nov 2023 12:31), Max: 98.1 (10 Nov 2023 20:39)  HR: 70 (11 Nov 2023 12:31) (70 - 74)  BP: 127/78 (11 Nov 2023 12:31) (126/73 - 132/78)  BP(mean): --  RR: 18 (11 Nov 2023 12:31) (18 - 18)  SpO2: 98% (11 Nov 2023 12:31) (95% - 98%)    Parameters below as of 11 Nov 2023 12:31  Patient On (Oxygen Delivery Method): room air        PHYSICAL EXAM:  Vascular: DP & PT palpable bilaterally, Capillary refill 3 seconds, Digital hair present bilaterally  Neurological: Light touch sensation not intact bilaterally  Musculoskeletal: 5/5 strength in all quadrants bilaterally, AJ & STJ ROM intact, s/p left hallux, 2nd digit, and 3rd digit partial amputations, healed.  Dermatological: Left 2nd digit lateral aspect edema and hyperpigmentation noted, no open lesions, no proximal streaking, no fluctuance                          13.0   32.46 )-----------( 142      ( 11 Nov 2023 07:25 )             39.3       11-11    141  |  108  |  31<H>  ----------------------------<  130<H>  4.4   |  27  |  1.50<H>    Ca    8.5      11 Nov 2023 07:25    TPro  6.7  /  Alb  3.2<L>  /  TBili  0.4  /  DBili  x   /  AST  14<L>  /  ALT  17  /  AlkPhos  58  11-11            Imaging: Left foot radiographs do not show any fractures, dislocations, periostitis, cortical erosions, or soft tissue emphysema. left foot mri shows marrow edema at the left 4th distal phalanx and the distal tip of the left 3rd digit amputation stump  
Patient is a 77y old  Male who presents with a chief complaint of cellulitis (11 Nov 2023 15:14)      INTERVAL HPI/OVERNIGHT EVENTS: Patient seen and examined at bedside. No overnight events.    MEDICATIONS  (STANDING):  aluminum hydroxide/magnesium hydroxide/simethicone Suspension 30 milliLiter(s) Oral every 6 hours  aspirin enteric coated 81 milliGRAM(s) Oral daily  atorvastatin 40 milliGRAM(s) Oral at bedtime  carvedilol 25 milliGRAM(s) Oral every 12 hours  ceFAZolin   IVPB 1000 milliGRAM(s) IV Intermittent every 8 hours  dextrose 5%. 1000 milliLiter(s) (50 mL/Hr) IV Continuous <Continuous>  dextrose 5%. 1000 milliLiter(s) (100 mL/Hr) IV Continuous <Continuous>  dextrose 50% Injectable 25 Gram(s) IV Push once  dextrose 50% Injectable 12.5 Gram(s) IV Push once  dextrose 50% Injectable 25 Gram(s) IV Push once  enoxaparin Injectable 40 milliGRAM(s) SubCutaneous every 24 hours  glucagon  Injectable 1 milliGRAM(s) IntraMuscular once  insulin lispro (ADMELOG) corrective regimen sliding scale   SubCutaneous at bedtime  insulin lispro (ADMELOG) corrective regimen sliding scale   SubCutaneous three times a day before meals  lactobacillus acidophilus 1 Tablet(s) Oral two times a day with meals  lisinopril 10 milliGRAM(s) Oral daily  pantoprazole    Tablet 40 milliGRAM(s) Oral daily    MEDICATIONS  (PRN):  acetaminophen     Tablet .. 650 milliGRAM(s) Oral every 6 hours PRN Temp greater or equal to 38C (100.4F), Mild Pain (1 - 3)  dextrose Oral Gel 15 Gram(s) Oral once PRN Blood Glucose LESS THAN 70 milliGRAM(s)/deciliter  melatonin 3 milliGRAM(s) Oral at bedtime PRN Insomnia  ondansetron Injectable 4 milliGRAM(s) IV Push every 8 hours PRN Nausea and/or Vomiting      Allergies    No Known Allergies    Intolerances        REVIEW OF SYSTEMS:  CONSTITUTIONAL: No fever or chills  CARDIOVASCULAR: No chest pain, palpitations  GASTROINTESTINAL: No abd pain, nausea, vomiting, or diarrhea    Vital Signs Last 24 Hrs  T(C): 36.6 (11 Nov 2023 12:31), Max: 36.7 (10 Nov 2023 20:39)  T(F): 97.8 (11 Nov 2023 12:31), Max: 98.1 (10 Nov 2023 20:39)  HR: 70 (11 Nov 2023 12:31) (70 - 74)  BP: 127/78 (11 Nov 2023 12:31) (126/73 - 131/73)  BP(mean): --  RR: 18 (11 Nov 2023 12:31) (18 - 18)  SpO2: 98% (11 Nov 2023 12:31) (96% - 98%)    Parameters below as of 11 Nov 2023 12:31  Patient On (Oxygen Delivery Method): room air      I&O's Summary    BMI (kg/m2): 27.5 (11-10-23 @ 09:22)    PHYSICAL EXAM:  GENERAL: NAD  HEENT:  AT/NC, anicteric, moist mucous membranes, EOMI, PERRL, no lid-lag, conjunctiva and sclera clear  CHEST/LUNG:  CTA b/l, no rales, wheezes, or rhonchi,  normal respiratory effort, no intercostal retractions  HEART:  RRR, S1, S2, no murmurs; no pitting edema  ABDOMEN:  BS+, soft, nontender, nondistended; No HSM  MSK/EXTREMITIES: palpable peripheral pulses, no clubbing or cyanosis; digit amputation, improved erythema and swelling  NERVOUS SYSTEM: answers questions and follows commands appropriately, A&Ox3 grossly moves all extremities   PSYCH: Appropriate affect, Alert & Awake; Good judgement      LABS: Personally reviewed  CBC                        13.0   32.46 )-----------( 142      ( 11 Nov 2023 07:25 )             39.3     CMP  11-11    141  |  108  |  31  ----------------------------<  130  4.4   |  27  |  1.50    Ca    8.5      11 Nov 2023 07:25    TPro  6.7  /  Alb  3.2  /  TBili  0.4  /  DBili  x   /  AST  14  /  ALT  17  /  AlkPhos  58  11-11            Lactate, Blood: 1.0 mmol/L (11-10 @ 11:30)                        POCT Blood Glucose.: 97 mg/dL (11 Nov 2023 17:03)  POCT Blood Glucose.: 118 mg/dL (11 Nov 2023 12:10)  POCT Blood Glucose.: 117 mg/dL (11 Nov 2023 07:42)  POCT Blood Glucose.: 126 mg/dL (10 Nov 2023 22:54)      Urinalysis Basic - ( 11 Nov 2023 07:25 )    Color: x / Appearance: x / SG: x / pH: x  Gluc: 130 mg/dL / Ketone: x  / Bili: x / Urobili: x   Blood: x / Protein: x / Nitrite: x   Leuk Esterase: x / RBC: x / WBC x   Sq Epi: x / Non Sq Epi: x / Bacteria: x        Culture - Blood (collected 10 Nov 2023 11:30)  Source: .Blood Blood-Peripheral  Preliminary Report (11 Nov 2023 17:01):    No growth at 24 hours    Culture - Blood (collected 10 Nov 2023 11:20)  Source: .Blood Blood-Peripheral  Preliminary Report (11 Nov 2023 17:01):    No growth at 24 hours            Culture - Blood (collected 11-10-23 @ 11:30)  Source: .Blood Blood-Peripheral  Preliminary Report (11-11-23 @ 17:01):    No growth at 24 hours    Culture - Blood (collected 11-10-23 @ 11:20)  Source: .Blood Blood-Peripheral  Preliminary Report (11-11-23 @ 17:01):    No growth at 24 hours        RADIOLOGY & ADDITIONAL TESTS: Personally reviewed.     Consultant(s) Notes Reviewed:  [x] YES  [ ] NO   Discussed with JOYCELYN/DANIAL, RN    
Patient is a 77y old  Male who presents with a chief complaint of cellulitis (12 Nov 2023 07:48)      INTERVAL HPI/OVERNIGHT EVENTS: Patient seen and examined at bedside. No overnight events.  Asking about dc plans    MEDICATIONS  (STANDING):  aluminum hydroxide/magnesium hydroxide/simethicone Suspension 30 milliLiter(s) Oral every 6 hours  aspirin enteric coated 81 milliGRAM(s) Oral daily  atorvastatin 40 milliGRAM(s) Oral at bedtime  carvedilol 25 milliGRAM(s) Oral every 12 hours  ceFAZolin   IVPB 1000 milliGRAM(s) IV Intermittent every 8 hours  dextrose 5%. 1000 milliLiter(s) (100 mL/Hr) IV Continuous <Continuous>  dextrose 5%. 1000 milliLiter(s) (50 mL/Hr) IV Continuous <Continuous>  dextrose 50% Injectable 25 Gram(s) IV Push once  dextrose 50% Injectable 12.5 Gram(s) IV Push once  dextrose 50% Injectable 25 Gram(s) IV Push once  enoxaparin Injectable 40 milliGRAM(s) SubCutaneous every 24 hours  glucagon  Injectable 1 milliGRAM(s) IntraMuscular once  insulin lispro (ADMELOG) corrective regimen sliding scale   SubCutaneous three times a day before meals  insulin lispro (ADMELOG) corrective regimen sliding scale   SubCutaneous at bedtime  lactobacillus acidophilus 1 Tablet(s) Oral two times a day with meals  lisinopril 10 milliGRAM(s) Oral daily  pantoprazole    Tablet 40 milliGRAM(s) Oral daily    MEDICATIONS  (PRN):  acetaminophen     Tablet .. 650 milliGRAM(s) Oral every 6 hours PRN Temp greater or equal to 38C (100.4F), Mild Pain (1 - 3)  dextrose Oral Gel 15 Gram(s) Oral once PRN Blood Glucose LESS THAN 70 milliGRAM(s)/deciliter  melatonin 3 milliGRAM(s) Oral at bedtime PRN Insomnia  ondansetron Injectable 4 milliGRAM(s) IV Push every 8 hours PRN Nausea and/or Vomiting      Allergies    No Known Allergies    Intolerances        REVIEW OF SYSTEMS:  CONSTITUTIONAL: No fever or chills  CARDIOVASCULAR: No chest pain, palpitations  GASTROINTESTINAL: No abd pain, nausea, vomiting, or diarrhea    Vital Signs Last 24 Hrs  T(C): 36.5 (12 Nov 2023 04:41), Max: 36.6 (11 Nov 2023 12:31)  T(F): 97.7 (12 Nov 2023 04:41), Max: 97.8 (11 Nov 2023 12:31)  HR: 70 (12 Nov 2023 04:41) (70 - 78)  BP: 117/69 (12 Nov 2023 04:41) (113/68 - 137/79)  BP(mean): --  RR: 18 (12 Nov 2023 04:41) (18 - 18)  SpO2: 96% (12 Nov 2023 04:41) (94% - 98%)    Parameters below as of 12 Nov 2023 04:41  Patient On (Oxygen Delivery Method): room air      I&O's Summary    BMI (kg/m2): 27.5 (11-10-23 @ 09:22)    PHYSICAL EXAM:  GENERAL: NAD  HEENT:  AT/NC, anicteric, moist mucous membranes, EOMI, PERRL, no lid-lag, conjunctiva and sclera clear  CHEST/LUNG:  CTA b/l, no rales, wheezes, or rhonchi,  normal respiratory effort, no intercostal retractions  HEART:  RRR, S1, S2, no murmurs; no pitting edema  ABDOMEN:  BS+, soft, nontender, nondistended; No HSM  MSK/EXTREMITIES: palpable peripheral pulses, no clubbing or cyanosis; digit amputation, improved erythema and swelling  NERVOUS SYSTEM: answers questions and follows commands appropriately, A&Ox3 grossly moves all extremities   PSYCH: Appropriate affect, Alert & Awake; Good judgement    LABS: Personally reviewed  CBC                        13.0   32.46 )-----------( 142      ( 11 Nov 2023 07:25 )             39.3     CMP  11-11    141  |  108  |  31  ----------------------------<  130  4.4   |  27  |  1.50    Ca    8.5      11 Nov 2023 07:25    TPro  6.7  /  Alb  3.2  /  TBili  0.4  /  DBili  x   /  AST  14  /  ALT  17  /  AlkPhos  58  11-11            Lactate, Blood: 1.0 mmol/L (11-10 @ 11:30)                        POCT Blood Glucose.: 136 mg/dL (12 Nov 2023 07:27)  POCT Blood Glucose.: 92 mg/dL (11 Nov 2023 21:39)  POCT Blood Glucose.: 97 mg/dL (11 Nov 2023 17:03)  POCT Blood Glucose.: 118 mg/dL (11 Nov 2023 12:10)      Urinalysis Basic - ( 11 Nov 2023 07:25 )    Color: x / Appearance: x / SG: x / pH: x  Gluc: 130 mg/dL / Ketone: x  / Bili: x / Urobili: x   Blood: x / Protein: x / Nitrite: x   Leuk Esterase: x / RBC: x / WBC x   Sq Epi: x / Non Sq Epi: x / Bacteria: x        Culture - Blood (collected 10 Nov 2023 11:30)  Source: .Blood Blood-Peripheral  Preliminary Report (11 Nov 2023 17:01):    No growth at 24 hours    Culture - Blood (collected 10 Nov 2023 11:20)  Source: .Blood Blood-Peripheral  Preliminary Report (11 Nov 2023 17:01):    No growth at 24 hours            Culture - Blood (collected 11-10-23 @ 11:30)  Source: .Blood Blood-Peripheral  Preliminary Report (11-11-23 @ 17:01):    No growth at 24 hours    Culture - Blood (collected 11-10-23 @ 11:20)  Source: .Blood Blood-Peripheral  Preliminary Report (11-11-23 @ 17:01):    No growth at 24 hours        RADIOLOGY & ADDITIONAL TESTS: Personally reviewed.     Consultant(s) Notes Reviewed:  [x] YES  [ ] NO   Discussed with JOYCELYN/DANIAL, RN

## 2023-11-12 NOTE — PROGRESS NOTE ADULT - ASSESSMENT
Left foot edema & hyperpigmentation 2/2 poss cellulitis  
77 year old male with pmhx of Type 2 DM, Neuropathy, CLL, HTN, HLD, CKD s/p left foot metatarsal amputation with Dr. Lugo in 2021 presents with complaints of redness and swelling of 2nd and 3rd digits of left toe.
77 year old male with pmhx of Type 2 DM, Neuropathy, CKD, CLL, HTN, HLD, s/p left foot metatarsal amputation with Dr. Lugo in 2021, who presents with complaints of redness and swelling of 2nd and 3rd digits of left toe. Concern for L toe cellulitis, no evidence of purulence on exam and appears improved today.    MRI shows findings equivocal for early osteomyelitis versus reactive osteitis within the tip of the fourth digit distal phalanx and within the stump of the third digit middle phalanx. However currently lower clinical suspicion for osteomyelitis in the absence of open wounds, would just monitor closely and follow up as outpatient. CRP also low at <3 and leukocytosis appears to be chronic in the setting of CLL. Remains afebrile and no new complaints. Blood cultures currently no growth.    #Left 2nd toe cellulitis  #Leukocytosis    -continue cefazolin 1g IV q8h until AM, then can switch to Keflex 500mg PO q6h x 5 days  -keep leg elevated  -follow up with ID as outpatient  -discussed with Dr. Hampton  -no ID contraindication to discharge    Naya Garcia MD  Division of Infectious Diseases   Cell [phone] between 8am and 6pm   After 6pm and weekends please call ID service at [phone].     35 minutes spent on total encounter assessing patient, examination, chart review, counseling and coordinating care by the attending physician/nurse/care manager.   
77 year old male with pmhx of Type 2 DM, Neuropathy, CLL, HTN, HLD, CKD s/p left foot metatarsal amputation with Dr. Lugo in 2021 presents with complaints of redness and swelling of 2nd and 3rd digits of left toe.

## 2023-11-12 NOTE — DISCHARGE NOTE PROVIDER - CARE PROVIDER_API CALL
Castillo Lugo  Podiatric Medicine and Surgery  [address] [place] [id number]  Phone: [phone]  Fax: [phone]  Follow Up Time:

## 2023-11-15 LAB
CULTURE RESULTS: SIGNIFICANT CHANGE UP
SPECIMEN SOURCE: SIGNIFICANT CHANGE UP

## 2023-11-21 ENCOUNTER — OUTPATIENT (OUTPATIENT)
Dept: OUTPATIENT SERVICES | Facility: HOSPITAL | Age: 78
LOS: 1 days | Discharge: HOME | End: 2023-11-21
Payer: MEDICARE

## 2023-11-21 ENCOUNTER — APPOINTMENT (OUTPATIENT)
Dept: WOUND CARE | Facility: HOSPITAL | Age: 78
End: 2023-11-21
Payer: MEDICARE

## 2023-11-21 VITALS
TEMPERATURE: 97.7 F | SYSTOLIC BLOOD PRESSURE: 139 MMHG | BODY MASS INDEX: 27.77 KG/M2 | HEART RATE: 67 BPM | RESPIRATION RATE: 18 BRPM | OXYGEN SATURATION: 98 % | WEIGHT: 205 LBS | DIASTOLIC BLOOD PRESSURE: 74 MMHG | HEIGHT: 72 IN

## 2023-11-21 DIAGNOSIS — S91.309A UNSPECIFIED OPEN WOUND, UNSPECIFIED FOOT, INITIAL ENCOUNTER: ICD-10-CM

## 2023-11-21 DIAGNOSIS — E11.621 TYPE 2 DIABETES MELLITUS WITH FOOT ULCER: ICD-10-CM

## 2023-11-21 DIAGNOSIS — E11.40 TYPE 2 DIABETES MELLITUS WITH DIABETIC NEUROPATHY, UNSPECIFIED: ICD-10-CM

## 2023-11-21 DIAGNOSIS — Z90.89 ACQUIRED ABSENCE OF OTHER ORGANS: Chronic | ICD-10-CM

## 2023-11-21 DIAGNOSIS — L97.509 TYPE 2 DIABETES MELLITUS WITH FOOT ULCER: ICD-10-CM

## 2023-11-21 DIAGNOSIS — L84 CORNS AND CALLOSITIES: ICD-10-CM

## 2023-11-21 DIAGNOSIS — Z89.422 ACQUIRED ABSENCE OF OTHER LEFT TOE(S): ICD-10-CM

## 2023-11-21 DIAGNOSIS — Z89.412 ACQUIRED ABSENCE OF LEFT GREAT TOE: ICD-10-CM

## 2023-11-21 DIAGNOSIS — L97.522 NON-PRESSURE CHRONIC ULCER OF OTHER PART OF LEFT FOOT WITH FAT LAYER EXPOSED: ICD-10-CM

## 2023-11-21 PROCEDURE — 99214 OFFICE O/P EST MOD 30 MIN: CPT

## 2023-11-21 PROCEDURE — G0463: CPT

## 2023-11-21 RX ORDER — LACTOBACILLUS ACIDOPHILUS/PECT 30 MG-20MG
TABLET ORAL
Refills: 0 | Status: ACTIVE | COMMUNITY

## 2023-11-21 RX ORDER — CIPROFLOXACIN HYDROCHLORIDE 250 MG/1
250 TABLET, FILM COATED ORAL
Qty: 30 | Refills: 1 | Status: COMPLETED | COMMUNITY
Start: 2022-01-11 | End: 2023-11-21

## 2023-11-21 RX ORDER — ALPRAZOLAM 0.5 MG/1
0.5 TABLET ORAL
Qty: 30 | Refills: 0 | Status: COMPLETED | COMMUNITY
Start: 2021-11-10 | End: 2023-11-21

## 2023-11-21 RX ORDER — MUPIROCIN 20 MG/G
2 OINTMENT TOPICAL TWICE DAILY
Qty: 1 | Refills: 5 | Status: COMPLETED | COMMUNITY
Start: 2022-02-23 | End: 2023-11-21

## 2023-11-21 RX ORDER — ALPRAZOLAM 0.5 MG/1
0.5 TABLET ORAL
Qty: 30 | Refills: 0 | Status: COMPLETED | COMMUNITY
Start: 2021-10-19 | End: 2023-11-21

## 2023-11-21 RX ORDER — NICOTINE POLACRILEX 2 MG
5000 LOZENGE BUCCAL
Refills: 0 | Status: ACTIVE | COMMUNITY

## 2023-11-21 RX ORDER — CHROMIUM 200 MCG
TABLET ORAL
Refills: 0 | Status: ACTIVE | COMMUNITY

## 2023-11-21 RX ORDER — CIPROFLOXACIN HYDROCHLORIDE 500 MG/1
500 TABLET, FILM COATED ORAL
Qty: 30 | Refills: 2 | Status: COMPLETED | COMMUNITY
Start: 2022-01-05 | End: 2023-11-21

## 2023-11-24 DIAGNOSIS — E11.621 TYPE 2 DIABETES MELLITUS WITH FOOT ULCER: ICD-10-CM

## 2023-11-24 DIAGNOSIS — Z79.84 LONG TERM (CURRENT) USE OF ORAL HYPOGLYCEMIC DRUGS: ICD-10-CM

## 2023-11-24 DIAGNOSIS — I10 ESSENTIAL (PRIMARY) HYPERTENSION: ICD-10-CM

## 2023-11-24 DIAGNOSIS — N28.9 DISORDER OF KIDNEY AND URETER, UNSPECIFIED: ICD-10-CM

## 2023-11-24 DIAGNOSIS — E11.40 TYPE 2 DIABETES MELLITUS WITH DIABETIC NEUROPATHY, UNSPECIFIED: ICD-10-CM

## 2023-11-24 DIAGNOSIS — Z79.899 OTHER LONG TERM (CURRENT) DRUG THERAPY: ICD-10-CM

## 2023-11-24 DIAGNOSIS — Z87.891 PERSONAL HISTORY OF NICOTINE DEPENDENCE: ICD-10-CM

## 2023-11-24 DIAGNOSIS — E78.2 MIXED HYPERLIPIDEMIA: ICD-10-CM

## 2023-11-24 DIAGNOSIS — L84 CORNS AND CALLOSITIES: ICD-10-CM

## 2023-11-24 DIAGNOSIS — Z89.422 ACQUIRED ABSENCE OF OTHER LEFT TOE(S): ICD-10-CM

## 2023-11-24 DIAGNOSIS — Z89.412 ACQUIRED ABSENCE OF LEFT GREAT TOE: ICD-10-CM

## 2023-11-24 DIAGNOSIS — Z79.85 LONG-TERM (CURRENT) USE OF INJECTABLE NON-INSULIN ANTIDIABETIC DRUGS: ICD-10-CM

## 2023-11-24 DIAGNOSIS — Z98.890 OTHER SPECIFIED POSTPROCEDURAL STATES: ICD-10-CM

## 2023-11-24 DIAGNOSIS — L97.522 NON-PRESSURE CHRONIC ULCER OF OTHER PART OF LEFT FOOT WITH FAT LAYER EXPOSED: ICD-10-CM

## 2023-11-24 DIAGNOSIS — Z79.82 LONG TERM (CURRENT) USE OF ASPIRIN: ICD-10-CM

## 2023-12-01 ENCOUNTER — APPOINTMENT (OUTPATIENT)
Dept: WOUND CARE | Facility: HOSPITAL | Age: 78
End: 2023-12-01

## 2025-07-06 ENCOUNTER — EMERGENCY (EMERGENCY)
Facility: HOSPITAL | Age: 80
LOS: 1 days | End: 2025-07-06
Attending: EMERGENCY MEDICINE
Payer: MEDICARE

## 2025-07-06 VITALS
OXYGEN SATURATION: 96 % | HEART RATE: 65 BPM | SYSTOLIC BLOOD PRESSURE: 150 MMHG | TEMPERATURE: 98 F | HEIGHT: 72 IN | DIASTOLIC BLOOD PRESSURE: 63 MMHG | WEIGHT: 225.09 LBS | RESPIRATION RATE: 17 BRPM

## 2025-07-06 DIAGNOSIS — Z90.89 ACQUIRED ABSENCE OF OTHER ORGANS: Chronic | ICD-10-CM

## 2025-07-06 PROCEDURE — 99284 EMERGENCY DEPT VISIT MOD MDM: CPT | Mod: FS

## 2025-07-06 PROCEDURE — 73060 X-RAY EXAM OF HUMERUS: CPT | Mod: 26,RT

## 2025-07-06 PROCEDURE — 99283 EMERGENCY DEPT VISIT LOW MDM: CPT | Mod: 25

## 2025-07-06 PROCEDURE — 73060 X-RAY EXAM OF HUMERUS: CPT

## 2025-07-06 NOTE — ED PROVIDER NOTE - PATIENT PORTAL LINK FT
You can access the FollowMyHealth Patient Portal offered by [facility] by registering at the following website: [email or web address]. By joining [employer]’s FollowMyHealth portal, you will also be able to view your health information using other applications (apps) compatible with our system.

## 2025-07-06 NOTE — ED ADULT TRIAGE NOTE - CHIEF COMPLAINT QUOTE
s/p hit right shoulder with an elevator door one week ago  right shoulder bruising and pain since then

## 2025-07-06 NOTE — ED PROVIDER NOTE - PHYSICAL EXAMINATION
NAD. VSS. Afebrile. Neck supple. Lungs clear. No spinal tender. +Right UE; small ecchymosis on proximal humerus, ant aspect, with tender. FUll ROMs of shoulder. No open wound. N/V- intact. No focal neuro deficit.

## 2025-07-06 NOTE — ED PROVIDER NOTE - ATTENDING APP SHARED VISIT CONTRIBUTION OF CARE
------------ATTENDING NOTE------------  [facility] pt c/o crush injury to r mid upper arm 1 wk ago, c/o as still mild tender and bruise, +FROM 5/5 nvi w/ bcr distally, soft compartments, no open wounds, no mass / calcifications, no additional complaints, in depth dw pt about ddx, tx, [place], continued close outpt fu.  - Jon Michael MD   ------------------------------------------------------

## 2025-07-06 NOTE — ED PROVIDER NOTE - OBJECTIVE STATEMENT
78yo male with PMHx of DM, HTN, HLD, on ASA presents to ED with right dominant upper arm pain s/p injury one week ago. Reports an elevator door hit his upper arm while closing. Denies fall or other injuries. Reports he noticed bruises on the affected area and worsening pain with movement. Denies sensory changes or weakness to extremities. Denies fever, chills, or cold symptoms. Denies CP/SOB/ABD pain or hip pain.

## 2025-07-06 NOTE — ED ADULT NURSE NOTE - OBJECTIVE STATEMENT
Patient c/o pain in R shoulder radiating down to elbow since 6/28 s/p elevator door closing on arm. Patient states the pain is constant and has not gone away since. Patient has bruise to upper R arm. Patient denies any other injuries. Has not taken anything for pain. No signs of acute distress at this time.

## 2025-07-06 NOTE — ED ADULT NURSE NOTE - CAS TRG GENERAL [employer], MLM
MD Notification    Notified Person: MD    Notified Person Name: Dr. To     Notification Date/Time: 1/27/20 0039    Notification Interaction: page    Purpose of Notification: FYI UA growing Pseudomonas aeruginosa- already on abx zosyn and vanco     Orders Received: -    Comments:   Patent